# Patient Record
Sex: MALE | Race: OTHER | NOT HISPANIC OR LATINO | Employment: OTHER | URBAN - METROPOLITAN AREA
[De-identification: names, ages, dates, MRNs, and addresses within clinical notes are randomized per-mention and may not be internally consistent; named-entity substitution may affect disease eponyms.]

---

## 2018-12-31 ENCOUNTER — APPOINTMENT (EMERGENCY)
Dept: RADIOLOGY | Facility: HOSPITAL | Age: 73
DRG: 673 | End: 2018-12-31
Payer: MEDICARE

## 2018-12-31 ENCOUNTER — HOSPITAL ENCOUNTER (INPATIENT)
Facility: HOSPITAL | Age: 73
LOS: 8 days | DRG: 673 | End: 2019-01-08
Attending: EMERGENCY MEDICINE | Admitting: FAMILY MEDICINE
Payer: MEDICARE

## 2018-12-31 ENCOUNTER — APPOINTMENT (EMERGENCY)
Dept: CT IMAGING | Facility: HOSPITAL | Age: 73
DRG: 673 | End: 2018-12-31
Payer: MEDICARE

## 2018-12-31 DIAGNOSIS — R63.4 WEIGHT LOSS: ICD-10-CM

## 2018-12-31 DIAGNOSIS — R06.00 DYSPNEA: ICD-10-CM

## 2018-12-31 DIAGNOSIS — N19 KIDNEY FAILURE: ICD-10-CM

## 2018-12-31 DIAGNOSIS — R53.83 FATIGUE: ICD-10-CM

## 2018-12-31 DIAGNOSIS — I50.9 CONGESTIVE HEART FAILURE (CHF) (HCC): ICD-10-CM

## 2018-12-31 DIAGNOSIS — R07.9 CHEST PAIN: Primary | ICD-10-CM

## 2018-12-31 DIAGNOSIS — R13.10 DYSPHAGIA, UNSPECIFIED TYPE: ICD-10-CM

## 2018-12-31 PROBLEM — N17.9 AKI (ACUTE KIDNEY INJURY) (HCC): Status: ACTIVE | Noted: 2018-12-31

## 2018-12-31 PROBLEM — N18.9 CKD (CHRONIC KIDNEY DISEASE): Status: ACTIVE | Noted: 2018-12-31

## 2018-12-31 PROBLEM — R06.02 SHORTNESS OF BREATH: Status: ACTIVE | Noted: 2018-12-31

## 2018-12-31 PROBLEM — I42.9 CARDIOMYOPATHY (HCC): Status: ACTIVE | Noted: 2018-12-31

## 2018-12-31 LAB
ALBUMIN SERPL BCP-MCNC: 3.4 G/DL (ref 3.5–5)
ALP SERPL-CCNC: 90 U/L (ref 46–116)
ALT SERPL W P-5'-P-CCNC: 23 U/L (ref 12–78)
ANION GAP SERPL CALCULATED.3IONS-SCNC: 9 MMOL/L (ref 4–13)
APTT PPP: 34 SECONDS (ref 26–38)
AST SERPL W P-5'-P-CCNC: 14 U/L (ref 5–45)
ATRIAL RATE: 86 BPM
ATRIAL RATE: 95 BPM
BASOPHILS # BLD AUTO: 0.03 THOUSANDS/ΜL (ref 0–0.1)
BASOPHILS NFR BLD AUTO: 0 % (ref 0–1)
BILIRUB SERPL-MCNC: 0.6 MG/DL (ref 0.2–1)
BUN SERPL-MCNC: 53 MG/DL (ref 5–25)
CALCIUM SERPL-MCNC: 9.8 MG/DL (ref 8.3–10.1)
CHLORIDE SERPL-SCNC: 93 MMOL/L (ref 100–108)
CO2 SERPL-SCNC: 27 MMOL/L (ref 21–32)
CREAT SERPL-MCNC: 4.73 MG/DL (ref 0.6–1.3)
EOSINOPHIL # BLD AUTO: 0.04 THOUSAND/ΜL (ref 0–0.61)
EOSINOPHIL NFR BLD AUTO: 0 % (ref 0–6)
ERYTHROCYTE [DISTWIDTH] IN BLOOD BY AUTOMATED COUNT: 16.3 % (ref 11.6–15.1)
GFR SERPL CREATININE-BSD FRML MDRD: 12 ML/MIN/1.73SQ M
GLUCOSE SERPL-MCNC: 174 MG/DL (ref 65–140)
GLUCOSE SERPL-MCNC: 181 MG/DL (ref 65–140)
GLUCOSE SERPL-MCNC: 231 MG/DL (ref 65–140)
HCT VFR BLD AUTO: 34.5 % (ref 36.5–49.3)
HGB BLD-MCNC: 11.2 G/DL (ref 12–17)
IMM GRANULOCYTES # BLD AUTO: 0.03 THOUSAND/UL (ref 0–0.2)
IMM GRANULOCYTES NFR BLD AUTO: 0 % (ref 0–2)
INR PPP: 1.22 (ref 0.86–1.17)
LYMPHOCYTES # BLD AUTO: 1.67 THOUSANDS/ΜL (ref 0.6–4.47)
LYMPHOCYTES NFR BLD AUTO: 18 % (ref 14–44)
MCH RBC QN AUTO: 26 PG (ref 26.8–34.3)
MCHC RBC AUTO-ENTMCNC: 32.5 G/DL (ref 31.4–37.4)
MCV RBC AUTO: 80 FL (ref 82–98)
MONOCYTES # BLD AUTO: 0.62 THOUSAND/ΜL (ref 0.17–1.22)
MONOCYTES NFR BLD AUTO: 7 % (ref 4–12)
NEUTROPHILS # BLD AUTO: 7.07 THOUSANDS/ΜL (ref 1.85–7.62)
NEUTS SEG NFR BLD AUTO: 75 % (ref 43–75)
NRBC BLD AUTO-RTO: 0 /100 WBCS
NT-PROBNP SERPL-MCNC: ABNORMAL PG/ML
P AXIS: 64 DEGREES
P AXIS: 75 DEGREES
PLATELET # BLD AUTO: 236 THOUSANDS/UL (ref 149–390)
PLATELET # BLD AUTO: 257 THOUSANDS/UL (ref 149–390)
PMV BLD AUTO: 10.7 FL (ref 8.9–12.7)
PMV BLD AUTO: 10.8 FL (ref 8.9–12.7)
POTASSIUM SERPL-SCNC: 5.1 MMOL/L (ref 3.5–5.3)
PR INTERVAL: 274 MS
PR INTERVAL: 276 MS
PROT SERPL-MCNC: 8.7 G/DL (ref 6.4–8.2)
PROTHROMBIN TIME: 15.3 SECONDS (ref 11.8–14.2)
QRS AXIS: -11 DEGREES
QRS AXIS: -17 DEGREES
QRSD INTERVAL: 86 MS
QRSD INTERVAL: 92 MS
QT INTERVAL: 350 MS
QT INTERVAL: 378 MS
QTC INTERVAL: 439 MS
QTC INTERVAL: 452 MS
RBC # BLD AUTO: 4.3 MILLION/UL (ref 3.88–5.62)
SODIUM SERPL-SCNC: 129 MMOL/L (ref 136–145)
T WAVE AXIS: 105 DEGREES
T WAVE AXIS: 119 DEGREES
TROPONIN I SERPL-MCNC: 0.04 NG/ML
TROPONIN I SERPL-MCNC: 0.06 NG/ML
TROPONIN I SERPL-MCNC: 0.06 NG/ML
VENTRICULAR RATE: 86 BPM
VENTRICULAR RATE: 95 BPM
WBC # BLD AUTO: 9.46 THOUSAND/UL (ref 4.31–10.16)

## 2018-12-31 PROCEDURE — 36415 COLL VENOUS BLD VENIPUNCTURE: CPT

## 2018-12-31 PROCEDURE — G0008 ADMIN INFLUENZA VIRUS VAC: HCPCS | Performed by: FAMILY MEDICINE

## 2018-12-31 PROCEDURE — 71250 CT THORAX DX C-: CPT

## 2018-12-31 PROCEDURE — 90662 IIV NO PRSV INCREASED AG IM: CPT | Performed by: FAMILY MEDICINE

## 2018-12-31 PROCEDURE — 85730 THROMBOPLASTIN TIME PARTIAL: CPT | Performed by: EMERGENCY MEDICINE

## 2018-12-31 PROCEDURE — 36415 COLL VENOUS BLD VENIPUNCTURE: CPT | Performed by: EMERGENCY MEDICINE

## 2018-12-31 PROCEDURE — 99223 1ST HOSP IP/OBS HIGH 75: CPT | Performed by: FAMILY MEDICINE

## 2018-12-31 PROCEDURE — 84484 ASSAY OF TROPONIN QUANT: CPT | Performed by: FAMILY MEDICINE

## 2018-12-31 PROCEDURE — 84484 ASSAY OF TROPONIN QUANT: CPT | Performed by: EMERGENCY MEDICINE

## 2018-12-31 PROCEDURE — 83880 ASSAY OF NATRIURETIC PEPTIDE: CPT | Performed by: EMERGENCY MEDICINE

## 2018-12-31 PROCEDURE — 99285 EMERGENCY DEPT VISIT HI MDM: CPT

## 2018-12-31 PROCEDURE — 93005 ELECTROCARDIOGRAM TRACING: CPT

## 2018-12-31 PROCEDURE — 85049 AUTOMATED PLATELET COUNT: CPT | Performed by: FAMILY MEDICINE

## 2018-12-31 PROCEDURE — 93010 ELECTROCARDIOGRAM REPORT: CPT | Performed by: INTERNAL MEDICINE

## 2018-12-31 PROCEDURE — 80053 COMPREHEN METABOLIC PANEL: CPT | Performed by: EMERGENCY MEDICINE

## 2018-12-31 PROCEDURE — 71045 X-RAY EXAM CHEST 1 VIEW: CPT

## 2018-12-31 PROCEDURE — 85025 COMPLETE CBC W/AUTO DIFF WBC: CPT | Performed by: EMERGENCY MEDICINE

## 2018-12-31 PROCEDURE — 85610 PROTHROMBIN TIME: CPT | Performed by: EMERGENCY MEDICINE

## 2018-12-31 PROCEDURE — 74176 CT ABD & PELVIS W/O CONTRAST: CPT

## 2018-12-31 PROCEDURE — 82948 REAGENT STRIP/BLOOD GLUCOSE: CPT

## 2018-12-31 RX ORDER — BENZONATATE 100 MG/1
100 CAPSULE ORAL
COMMUNITY

## 2018-12-31 RX ORDER — FUROSEMIDE 10 MG/ML
40 INJECTION INTRAMUSCULAR; INTRAVENOUS
Status: DISCONTINUED | OUTPATIENT
Start: 2018-12-31 | End: 2019-01-08

## 2018-12-31 RX ORDER — PANTOPRAZOLE SODIUM 40 MG/1
40 TABLET, DELAYED RELEASE ORAL
Status: ON HOLD | COMMUNITY
End: 2019-01-15

## 2018-12-31 RX ORDER — TORSEMIDE 20 MG/1
20 TABLET ORAL
COMMUNITY
End: 2019-01-16 | Stop reason: HOSPADM

## 2018-12-31 RX ORDER — SODIUM BICARBONATE 325 MG/1
325 TABLET ORAL
COMMUNITY
End: 2019-01-16 | Stop reason: HOSPADM

## 2018-12-31 RX ORDER — ATORVASTATIN CALCIUM 10 MG/1
10 TABLET, FILM COATED ORAL
Status: ON HOLD | COMMUNITY
End: 2019-01-15

## 2018-12-31 RX ORDER — ASPIRIN 81 MG/1
81 TABLET, CHEWABLE ORAL DAILY
COMMUNITY

## 2018-12-31 RX ORDER — BENZONATATE 100 MG/1
100 CAPSULE ORAL 3 TIMES DAILY
Status: DISCONTINUED | OUTPATIENT
Start: 2018-12-31 | End: 2019-01-08 | Stop reason: HOSPADM

## 2018-12-31 RX ORDER — PANTOPRAZOLE SODIUM 40 MG/1
40 TABLET, DELAYED RELEASE ORAL
Status: DISCONTINUED | OUTPATIENT
Start: 2019-01-01 | End: 2019-01-08 | Stop reason: HOSPADM

## 2018-12-31 RX ORDER — ATORVASTATIN CALCIUM 10 MG/1
10 TABLET, FILM COATED ORAL
Status: DISCONTINUED | OUTPATIENT
Start: 2019-01-01 | End: 2019-01-08 | Stop reason: HOSPADM

## 2018-12-31 RX ORDER — ASPIRIN 81 MG/1
81 TABLET, CHEWABLE ORAL DAILY
Status: DISCONTINUED | OUTPATIENT
Start: 2019-01-01 | End: 2019-01-08 | Stop reason: HOSPADM

## 2018-12-31 RX ORDER — HEPARIN SODIUM 5000 [USP'U]/ML
5000 INJECTION, SOLUTION INTRAVENOUS; SUBCUTANEOUS EVERY 8 HOURS SCHEDULED
Status: DISCONTINUED | OUTPATIENT
Start: 2018-12-31 | End: 2019-01-06

## 2018-12-31 RX ADMIN — INFLUENZA A VIRUS A/MICHIGAN/45/2015 X-275 (H1N1) ANTIGEN (FORMALDEHYDE INACTIVATED), INFLUENZA A VIRUS A/SINGAPORE/INFIMH-16-0019/2016 IVR-186 (H3N2) ANTIGEN (FORMALDEHYDE INACTIVATED), AND INFLUENZA B VIRUS B/MARYLAND/15/2016 BX-69A (A B/COLORADO/6/2017-LIKE VIRUS) ANTIGEN (FORMALDEHYDE INACTIVATED) 0.5 ML: 60; 60; 60 INJECTION, SUSPENSION INTRAMUSCULAR at 20:21

## 2018-12-31 RX ADMIN — FUROSEMIDE 40 MG: 10 INJECTION, SOLUTION INTRAMUSCULAR; INTRAVENOUS at 20:21

## 2018-12-31 RX ADMIN — BENZONATATE 100 MG: 100 CAPSULE ORAL at 20:21

## 2018-12-31 RX ADMIN — HEPARIN SODIUM 5000 UNITS: 5000 INJECTION, SOLUTION INTRAVENOUS; SUBCUTANEOUS at 21:40

## 2018-12-31 NOTE — ED PROVIDER NOTES
History  Chief Complaint   Patient presents with    Chest Pain     pt c/o mid-chest pain since last night  cough  EMS administered 324 ASA  and SL nitrox3     81yo male is coming with CP and SOB and fatigue and decreased exercise tolerance for the past few days  Pt was [place] and while there got sick, and was told he "had a small heart attack" when he was in the hospital  He returned to [facility] and went to [facility] and was admitted and was told he "had massive heart attack" and was in kidney and heart failure  Pt has had difficulty with eating as well  He went home after a week at [facility], he was recommended rehab facility but he refused but he has gone home and not done well  He again last night had intermittent CP, pressure and felt SOB, it relieved some with NTG but called ambulance  History provided by:  Patient  Chest Pain   Pain location:  Substernal area  Pain quality: pressure    Pain radiates to:  Does not radiate  Pain radiates to the back: no    Pain severity:  Moderate  Onset quality:  Gradual  Duration:  1 day  Timing:  Intermittent  Progression:  Waxing and waning  Chronicity:  New  Context: at rest    Relieved by:  Nitroglycerin  Worsened by:  Nothing tried  Ineffective treatments:  Rest  Associated symptoms: cough, fatigue, nausea and shortness of breath    Associated symptoms: no abdominal pain, no anxiety, no back pain, no diaphoresis, no fever, no lower extremity edema, no numbness, no orthopnea, no syncope, not vomiting and no weakness    Risk factors: coronary artery disease and diabetes mellitus        Prior to Admission Medications   Prescriptions Last Dose Informant Patient Reported? Taking?    METOPROLOL SUCCINATE PO   Yes Yes   Sig: Take 500 mg by mouth   aspirin 81 mg chewable tablet   Yes Yes   Sig: Chew 81 mg daily   atorvastatin (LIPITOR) 10 mg tablet   Yes Yes   Sig: Take 10 mg by mouth   benzonatate (TESSALON PERLES) 100 mg capsule   Yes Yes   Sig: Take 100 mg by mouth   pantoprazole (PROTONIX) 40 mg tablet   Yes Yes   Sig: Take 40 mg by mouth   sodium bicarbonate 325 MG tablet   Yes Yes   Sig: Take 325 mg by mouth   torsemide (DEMADEX) 20 mg tablet   Yes Yes   Sig: Take 20 mg by mouth      Facility-Administered Medications: None       Past Medical History:   Diagnosis Date    Hyperlipidemia     Hypertension     Myocardial infarction [facility])        Past Surgical History:   Procedure Laterality Date    CATARACT EXTRACTION         History reviewed  No pertinent family history  I have reviewed and agree with the history as documented  Social History   Substance Use Topics    Smoking status: Former Smoker    Smokeless tobacco: Never Used    Alcohol use No        Review of Systems   Constitutional: Positive for fatigue  Negative for diaphoresis and fever  Respiratory: Positive for cough and shortness of breath  Cardiovascular: Positive for chest pain  Negative for orthopnea and syncope  Gastrointestinal: Positive for nausea  Negative for abdominal pain and vomiting  Musculoskeletal: Negative for back pain  Neurological: Negative for weakness and numbness  All other systems reviewed and are negative  Physical Exam  Physical Exam   Constitutional: He is oriented to person, place, and time  He appears well-developed and well-nourished  No distress  HENT:   Head: Normocephalic and atraumatic  Mouth/Throat: Mucous membranes are dry  Eyes: Pupils are equal, round, and reactive to light  Conjunctivae and EOM are normal    Neck: Normal range of motion  Neck supple  Cardiovascular: Normal rate and regular rhythm  Pulmonary/Chest: Effort normal  No respiratory distress  He has no wheezes  He has rales  Abdominal: Soft  Bowel sounds are normal  He exhibits no distension  There is no tenderness  Musculoskeletal: Normal range of motion  He exhibits edema  Neurological: He is alert and oriented to person, place, and time  No cranial nerve deficit   He exhibits normal muscle tone  Skin: Skin is warm and dry  He is not diaphoretic  Psychiatric: He has a normal mood and affect  Nursing note and vitals reviewed  Vital Signs  ED Triage Vitals [12/31/18 1252]   Temperature Pulse Respirations Blood Pressure SpO2   97 9 °F (36 6 °C) 95 19 148/83 98 %      Temp Source Heart Rate Source Patient Position - Orthostatic VS BP Location FiO2 (%)   Oral Monitor Lying Right arm --      Pain Score       Worst Possible Pain           Vitals:    12/31/18 1252   BP: 148/83   Pulse: 95   Patient Position - Orthostatic VS: Lying       Visual Acuity      ED Medications  Medications - No data to display    Diagnostic Studies  Results Reviewed     Procedure Component Value Units Date/Time    Troponin I [[id number]]  (Normal) Collected:  12/31/18 1259    Lab Status:  Final result Specimen:  Blood from Arm, Left Updated:  12/31/18 1330     Troponin I 0 04 ng/mL     CBC and differential [[id number]]  (Abnormal) Collected:  12/31/18 1259    Lab Status:  Final result Specimen:  Blood from Arm, Left Updated:  12/31/18 1307     WBC 9 46 Thousand/uL      RBC 4 30 Million/uL      Hemoglobin 11 2 (L) g/dL      Hematocrit 34 5 (L) %      MCV 80 (L) fL      MCH 26 0 (L) pg      MCHC 32 5 g/dL      RDW 16 3 (H) %      MPV 10 7 fL      Platelets 280 Thousands/uL      nRBC 0 /100 WBCs      Neutrophils Relative 75 %      Immat GRANS % 0 %      Lymphocytes Relative 18 %      Monocytes Relative 7 %      Eosinophils Relative 0 %      Basophils Relative 0 %      Neutrophils Absolute 7 07 Thousands/µL      Immature Grans Absolute 0 03 Thousand/uL      Lymphocytes Absolute 1 67 Thousands/µL      Monocytes Absolute 0 62 Thousand/µL      Eosinophils Absolute 0 04 Thousand/µL      Basophils Absolute 0 03 Thousands/µL     Comprehensive metabolic panel [[id number]] Collected:  12/31/18 1259    Lab Status:   In process Specimen:  Blood from Arm, Left Updated:  12/31/18 1303                 X-ray chest 1 view portable    (Results Pending)              Procedures  Procedures       Phone Contacts  ED Phone Contact    ED Course  ED Course as of Gian 10 1515   Mon Dec 31, 2018   1629 D/w pt labs and heart and renal failure  Pt was offered rehab after [place] but didn't want to go but d/w him if he refused to stay would try and talk with case management about getting him home services but that would take days  26 Pt                                MDM  Number of Diagnoses or Management Options  Chest pain: new and requires workup  Congestive heart failure (CHF) ([facility] ): new and requires workup  Dyspnea: new and requires workup  Fatigue: new and requires workup  Kidney failure: new and requires workup     Amount and/or Complexity of Data Reviewed  Clinical lab tests: ordered and reviewed  Tests in the radiology section of CPT®: ordered and reviewed  Review and summarize past medical records: yes (Received records from [facility] from last admission  Had echo and labs )    Risk of Complications, Morbidity, and/or Mortality  Presenting problems: high      CritCare Time    Disposition  Final diagnoses:   None     ED Disposition     None      Follow-up Information    None         Patient's Medications   Discharge Prescriptions    No medications on file     No discharge procedures on file      ED Provider  Electronically Signed by           Amanda Greco MD  01/10/19 0269

## 2018-12-31 NOTE — H&P
History and Physical - [facility]    Patient Information: Cali Turner 72 y o  male MRN: [id number]  Unit/Bed#: ED 23 Encounter: [id number]  Admitting Physician: Nena Crowe MD  PCP: No primary care provider on file  Date of Admission:  12/31/18    Assessment/Plan:    Hospital Problem List:     Active Problems:    Shortness of breath    Chest pain    NATALIIA (acute kidney injury) ([facility] )    CKD (chronic kidney disease)    Cardiomyopathy ([facility] )      Plan for the Primary Problem(s):  Short of breath:likely secondary to acute decompensation of cardiomyopathy with low ejection fraction vs bronchopneumonia process, chest x-ray with  Right-sided infiltrates suspicious for developing pneumonia as well as trace pleural effusions    -Observe off antibiotic no fever, No elevation of WBC, patient looks acutely ill consider to start antibiotic after procalcitonin  -Procalcitonin  -input and output  -IV Lasix  -continue telemetric  -cardiology Nephrology recommendation  -Please see chart from another Hospital for echo report and endoscopy, as well as previous discharge summary     · NATALIIA/CKDIV:  Patient was discharged on torsemide  Appears to be overload  Per record patient refused to be on hemodialysis which at this point he could be conceded  Nephrology recommendation  · Chest pain:  most likely secondary to acute decompensation of cardiomyopathy  Follow further Cardiology recommendation  · CAD:  Status post nonSTMI per clinical our record in the chart  Patient was no candidate for cardiac catheterization as per record    Continue secondary prevention and follow-up cardiology recommendation  · GERD with spha: continue PPI    VTE Prophylaxis: Heparin  / sequential compression device   Code Status: full code  POLST: There is no POLST form on file for this patient (pre-hospital)    Anticipated Length of Stay:  Patient will be admitted on an Inpatient basis with an anticipated length of stay of  > 2 midnights  Justification for Hospital Stay:  Short of breath secondary to acute decompensation of cardiomyopathy    Total Time for Visit, including Counseling / Coordination of Care: 1 hour  Greater than 50% of this total time spent on direct patient counseling and coordination of care  Chief Complaint:  Short of breath    History of Present Illness:    Evelyn Camejo is a 72 y o  male significant past medical history of hyperlipidemia, HTN, CAD, cardiomyopathy, who was recently discharged from [facility] after patient was admitted for non ST MI and acute on chronic kidney disease  Patient cancer emergency department complaining of increasing short of breath for the last 24 hour, worse on extension, requiring oxygen supplementation, patient also states he has been having some chest pain, comes and goes  Patient states he has been having  generalized weakness  decrease of appetite  Review of Systems:    Review of Systems   Constitutional: Negative for activity change, appetite change, chills and diaphoresis  Respiratory: Positive for cough, chest tightness and shortness of breath  Cardiovascular: Positive for chest pain  Negative for palpitations and leg swelling  Genitourinary: Negative for difficulty urinating, enuresis and flank pain  Past Medical and Surgical History:     Past Medical History:   Diagnosis Date    CHF [facility]congestive heart failure) ([facility] )     [facility]     Dysphagia     Hyperlipidemia     Hypertension     Myocardial infarction [facility])        Past Surgical History:   Procedure Laterality Date    CATARACT EXTRACTION         Meds/Allergies:    Prior to Admission medications    Medication Sig Start Date End Date Taking?  Authorizing Provider   aspirin 81 mg chewable tablet Chew 81 mg daily   Yes Historical Provider, MD   atorvastatin (LIPITOR) 10 mg tablet Take 10 mg by mouth   Yes Historical Provider, MD   benzonatate (TESSALON PERLES) 100 mg capsule Take 100 mg by mouth   Yes Historical Provider, MD   METOPROLOL SUCCINATE PO Take 500 mg by mouth   Yes Historical Provider, MD   pantoprazole (PROTONIX) 40 mg tablet Take 40 mg by mouth   Yes Historical Provider, MD   sodium bicarbonate 325 MG tablet Take 325 mg by mouth   Yes Historical Provider, MD   torsemide (DEMADEX) 20 mg tablet Take 20 mg by mouth   Yes Historical Provider, MD     I have reviewed home medications with patient personally  Allergies: No Known Allergies    Social History:     Marital Status: /Civil Union   Occupation:   Patient Pre-hospital Living Situation:   Patient Pre-hospital Level of Mobility:   Patient Pre-hospital Diet Restrictions:   Substance Use History:   History   Alcohol Use No     History   Smoking Status    Former Smoker   Smokeless Tobacco    Never Used     History   Drug Use No       Family History:    non-contributory    Physical Exam:     Vitals:   Blood Pressure: 131/77 (12/31/18 1730)  Pulse: 80 (12/31/18 1730)  Temperature: 97 9 °F (36 6 °C) (12/31/18 1252)  Temp Source: Oral (12/31/18 1252)  Respirations: 18 (12/31/18 1730)  Weight - Scale: 70 6 kg (155 lb 10 3 oz) (12/31/18 1252)  SpO2: 100 % (12/31/18 1730)    Physical Exam   Constitutional: He is oriented to person, place, and time  He appears ill  No distress  On NC   Neck: Normal range of motion  Neck supple  JVD present  No tracheal deviation present  No thyromegaly present  Cardiovascular: Normal rate, regular rhythm, normal heart sounds and intact distal pulses  Exam reveals no gallop and no friction rub  No murmur heard  Pulmonary/Chest: No respiratory distress  He has no wheezes  He has rales  He exhibits no tenderness  All over   Abdominal: He exhibits no distension and no mass  There is no tenderness  There is no rebound and no guarding  Musculoskeletal: He exhibits no edema or tenderness  Lymphadenopathy:     He has no cervical adenopathy     Neurological: He is alert and oriented to person, place, and time  He has normal reflexes  No cranial nerve deficit  Coordination normal    Skin: Skin is warm  He is not diaphoretic  Psychiatric: He has a normal mood and affect  Additional Data:     Lab Results: I have personally reviewed pertinent reports  Results from last 7 days  Lab Units 12/31/18  1259   WBC Thousand/uL 9 46   HEMOGLOBIN g/dL 11 2*   HEMATOCRIT % 34 5*   PLATELETS Thousands/uL 257   NEUTROS PCT % 75   LYMPHS PCT % 18   MONOS PCT % 7   EOS PCT % 0       Results from last 7 days  Lab Units 12/31/18  1259   POTASSIUM mmol/L 5 1   CHLORIDE mmol/L 93*   CO2 mmol/L 27   BUN mg/dL 53*   CREATININE mg/dL 4 73*   CALCIUM mg/dL 9 8   ALK PHOS U/L 90   ALT U/L 23   AST U/L 14       Results from last 7 days  Lab Units 12/31/18  1506   INR  1 22*       Imaging: I have personally reviewed pertinent reports  Ct Chest Abdomen Pelvis Wo Contrast    Result Date: 12/31/2018  Narrative: CT CHEST, ABDOMEN AND PELVIS WITHOUT IV CONTRAST INDICATION:   Chest pain and renal failure  COMPARISON:  None  TECHNIQUE: CT examination of the chest, abdomen and pelvis was performed without intravenous contrast   Axial, sagittal, and coronal 2D reformatted images were created from the source data and submitted for interpretation  Radiation dose length product (DLP) for this visit:  615 mGy-cm   This examination, like all CT scans performed in the [facility], was performed utilizing techniques to minimize radiation dose exposure, including the use of iterative reconstruction and automated exposure control  Enteric contrast was not administered in accordance with physician request  FINDINGS: CHEST LUNGS:  There is bibasilar consolidation, likely compressive atelectasis  There is a 2 mm right upper lobe pulmonary nodule on series 3, image 52  PLEURA:  There are moderate bilateral pleural effusions  HEART/GREAT VESSELS:  There is mild cardiomegaly   MEDIASTINUM AND VIKKI: Unremarkable  CHEST WALL AND LOWER NECK:   Unremarkable  ABDOMEN LIVER/BILIARY TREE:  Unremarkable  GALLBLADDER:  There are gallstone(s) within the gallbladder, without pericholecystic inflammatory changes  SPLEEN:  Unremarkable  PANCREAS:  Unremarkable  ADRENAL GLANDS:  Unremarkable  KIDNEYS/URETERS:  Unremarkable  No hydronephrosis  STOMACH AND BOWEL:  Unremarkable  APPENDIX:  No findings to suggest appendicitis  ABDOMINOPELVIC CAVITY:  No ascites or free intraperitoneal air  No lymphadenopathy  VESSELS:  Unremarkable for patient's age  PELVIS REPRODUCTIVE ORGANS:  Unremarkable for patient's age  URINARY BLADDER:  Unremarkable  ABDOMINAL WALL/INGUINAL REGIONS:  Unremarkable  OSSEOUS STRUCTURES:  No acute fracture or destructive osseous lesion  Impression: 1  Cardiomegaly with moderate bilateral pleural effusions and overlying compressive atelectasis  Clinical correlation for congestive heart failure recommended  2   2 mm right upper lobe nodule  Based on current Fleischner Society 2017 Guidelines on incidental pulmonary nodule, no routine follow-up is needed if the patient is considered low risk for lung cancer  If the patient is considered high risk for lung cancer, 12 month follow-up non-contrast chest CT is recommended  3   No acute abnormality in the abdomen or pelvis  4   Cholelithiasis  Workstation performed: [id number]     X-ray Chest 1 View Portable    Result Date: 12/31/2018  Narrative: CHEST INDICATION:   chest pain  Cough COMPARISON:  None EXAM PERFORMED/VIEWS:  XR CHEST PORTABLE FINDINGS: There is a suboptimal inspiration  This may partly explain the prominent appearance of the heart  Cardiomegaly not excluded  There is an ill-defined right lung infiltrate in the infrahilar region and there appear to be trace pleural effusions  Left lung clear  No pneumothorax identified  Osseous structures appear within normal limits for patient age       Impression: Right-sided infiltrates suspicious for developing pneumonia as well as trace pleural effusions  Questionable cardiomegaly    Limited inspiration may exaggerate some of the findings  Suggest continued follow-up preferably with deeper inspiratory effort with PA and lateral views  Workstation performed: [id number]       EKG, Pathology, and Other Studies Reviewed on Admission:   · EKG:     Allscripts / Epic Records Reviewed: Yes     ** Please Note: This note has been constructed using a voice recognition system   **

## 2018-12-31 NOTE — ED NOTES
Request Faxed to [facility] for medical records from pts recent admission       Sonja Gaitan RN  12/31/18 8238

## 2019-01-01 PROBLEM — E87.1 HYPONATREMIA: Status: ACTIVE | Noted: 2019-01-01

## 2019-01-01 LAB
ALBUMIN SERPL BCP-MCNC: 2.8 G/DL (ref 3.5–5)
ALP SERPL-CCNC: 73 U/L (ref 46–116)
ALT SERPL W P-5'-P-CCNC: 17 U/L (ref 12–78)
ANION GAP SERPL CALCULATED.3IONS-SCNC: 10 MMOL/L (ref 4–13)
AST SERPL W P-5'-P-CCNC: 16 U/L (ref 5–45)
ATRIAL RATE: 88 BPM
BACTERIA UR QL AUTO: ABNORMAL /HPF
BASOPHILS # BLD AUTO: 0.04 THOUSANDS/ΜL (ref 0–0.1)
BASOPHILS NFR BLD AUTO: 1 % (ref 0–1)
BILIRUB SERPL-MCNC: 0.5 MG/DL (ref 0.2–1)
BILIRUB UR QL STRIP: NEGATIVE
BUN SERPL-MCNC: 52 MG/DL (ref 5–25)
CALCIUM SERPL-MCNC: 9.2 MG/DL (ref 8.3–10.1)
CHLORIDE SERPL-SCNC: 96 MMOL/L (ref 100–108)
CLARITY UR: CLEAR
CO2 SERPL-SCNC: 27 MMOL/L (ref 21–32)
COLOR UR: YELLOW
CREAT SERPL-MCNC: 4.69 MG/DL (ref 0.6–1.3)
CREAT UR-MCNC: 23.9 MG/DL
EOSINOPHIL # BLD AUTO: 0.09 THOUSAND/ΜL (ref 0–0.61)
EOSINOPHIL NFR BLD AUTO: 1 % (ref 0–6)
ERYTHROCYTE [DISTWIDTH] IN BLOOD BY AUTOMATED COUNT: 16.2 % (ref 11.6–15.1)
GFR SERPL CREATININE-BSD FRML MDRD: 12 ML/MIN/1.73SQ M
GLUCOSE SERPL-MCNC: 104 MG/DL (ref 65–140)
GLUCOSE UR STRIP-MCNC: NEGATIVE MG/DL
HCT VFR BLD AUTO: 29.5 % (ref 36.5–49.3)
HGB BLD-MCNC: 9.3 G/DL (ref 12–17)
HGB UR QL STRIP.AUTO: ABNORMAL
IMM GRANULOCYTES # BLD AUTO: 0.02 THOUSAND/UL (ref 0–0.2)
IMM GRANULOCYTES NFR BLD AUTO: 0 % (ref 0–2)
KETONES UR STRIP-MCNC: NEGATIVE MG/DL
LEUKOCYTE ESTERASE UR QL STRIP: NEGATIVE
LYMPHOCYTES # BLD AUTO: 1.82 THOUSANDS/ΜL (ref 0.6–4.47)
LYMPHOCYTES NFR BLD AUTO: 25 % (ref 14–44)
MCH RBC QN AUTO: 26 PG (ref 26.8–34.3)
MCHC RBC AUTO-ENTMCNC: 31.5 G/DL (ref 31.4–37.4)
MCV RBC AUTO: 82 FL (ref 82–98)
MICROALBUMIN UR-MCNC: 1490 MG/L (ref 0–20)
MICROALBUMIN/CREAT 24H UR: 6234 MG/G CREATININE (ref 0–30)
MONOCYTES # BLD AUTO: 0.57 THOUSAND/ΜL (ref 0.17–1.22)
MONOCYTES NFR BLD AUTO: 8 % (ref 4–12)
NEUTROPHILS # BLD AUTO: 4.82 THOUSANDS/ΜL (ref 1.85–7.62)
NEUTS SEG NFR BLD AUTO: 65 % (ref 43–75)
NITRITE UR QL STRIP: NEGATIVE
NON-SQ EPI CELLS URNS QL MICRO: ABNORMAL /HPF
NRBC BLD AUTO-RTO: 0 /100 WBCS
P AXIS: 74 DEGREES
PH UR STRIP.AUTO: 6 [PH] (ref 4.5–8)
PLATELET # BLD AUTO: 205 THOUSANDS/UL (ref 149–390)
PMV BLD AUTO: 10.9 FL (ref 8.9–12.7)
POTASSIUM SERPL-SCNC: 4.3 MMOL/L (ref 3.5–5.3)
PR INTERVAL: 268 MS
PROCALCITONIN SERPL-MCNC: 0.07 NG/ML
PROT SERPL-MCNC: 7.3 G/DL (ref 6.4–8.2)
PROT UR STRIP-MCNC: ABNORMAL MG/DL
QRS AXIS: -21 DEGREES
QRSD INTERVAL: 92 MS
QT INTERVAL: 366 MS
QTC INTERVAL: 442 MS
RBC # BLD AUTO: 3.58 MILLION/UL (ref 3.88–5.62)
RBC #/AREA URNS AUTO: ABNORMAL /HPF
SODIUM 24H UR-SCNC: 96 MOL/L
SODIUM SERPL-SCNC: 133 MMOL/L (ref 136–145)
SP GR UR STRIP.AUTO: 1.02 (ref 1–1.03)
T WAVE AXIS: 121 DEGREES
TROPONIN I SERPL-MCNC: 0.05 NG/ML
TROPONIN I SERPL-MCNC: 0.06 NG/ML
UROBILINOGEN UR QL STRIP.AUTO: 0.2 E.U./DL
UUN 24H UR-MCNC: 191 MG/DL
VENTRICULAR RATE: 88 BPM
WBC # BLD AUTO: 7.36 THOUSAND/UL (ref 4.31–10.16)
WBC #/AREA URNS AUTO: ABNORMAL /HPF

## 2019-01-01 PROCEDURE — 82043 UR ALBUMIN QUANTITATIVE: CPT | Performed by: INTERNAL MEDICINE

## 2019-01-01 PROCEDURE — 84540 ASSAY OF URINE/UREA-N: CPT | Performed by: INTERNAL MEDICINE

## 2019-01-01 PROCEDURE — 93005 ELECTROCARDIOGRAM TRACING: CPT

## 2019-01-01 PROCEDURE — 80053 COMPREHEN METABOLIC PANEL: CPT | Performed by: FAMILY MEDICINE

## 2019-01-01 PROCEDURE — 99222 1ST HOSP IP/OBS MODERATE 55: CPT | Performed by: INTERNAL MEDICINE

## 2019-01-01 PROCEDURE — 99232 SBSQ HOSP IP/OBS MODERATE 35: CPT | Performed by: FAMILY MEDICINE

## 2019-01-01 PROCEDURE — 99223 1ST HOSP IP/OBS HIGH 75: CPT | Performed by: INTERNAL MEDICINE

## 2019-01-01 PROCEDURE — 85025 COMPLETE CBC W/AUTO DIFF WBC: CPT | Performed by: FAMILY MEDICINE

## 2019-01-01 PROCEDURE — 82570 ASSAY OF URINE CREATININE: CPT | Performed by: INTERNAL MEDICINE

## 2019-01-01 PROCEDURE — 81001 URINALYSIS AUTO W/SCOPE: CPT | Performed by: INTERNAL MEDICINE

## 2019-01-01 PROCEDURE — 84484 ASSAY OF TROPONIN QUANT: CPT | Performed by: FAMILY MEDICINE

## 2019-01-01 PROCEDURE — 90732 PPSV23 VACC 2 YRS+ SUBQ/IM: CPT | Performed by: FAMILY MEDICINE

## 2019-01-01 PROCEDURE — 84145 PROCALCITONIN (PCT): CPT | Performed by: FAMILY MEDICINE

## 2019-01-01 PROCEDURE — G0009 ADMIN PNEUMOCOCCAL VACCINE: HCPCS | Performed by: FAMILY MEDICINE

## 2019-01-01 PROCEDURE — 84300 ASSAY OF URINE SODIUM: CPT | Performed by: INTERNAL MEDICINE

## 2019-01-01 PROCEDURE — 93010 ELECTROCARDIOGRAM REPORT: CPT | Performed by: INTERNAL MEDICINE

## 2019-01-01 RX ORDER — HYDROMORPHONE HCL/PF 1 MG/ML
0.5 SYRINGE (ML) INJECTION ONCE
Status: COMPLETED | OUTPATIENT
Start: 2019-01-01 | End: 2019-01-01

## 2019-01-01 RX ADMIN — ASPIRIN 81 MG 81 MG: 81 TABLET ORAL at 09:23

## 2019-01-01 RX ADMIN — HEPARIN SODIUM 5000 UNITS: 5000 INJECTION, SOLUTION INTRAVENOUS; SUBCUTANEOUS at 23:49

## 2019-01-01 RX ADMIN — ATORVASTATIN CALCIUM 10 MG: 10 TABLET, FILM COATED ORAL at 17:36

## 2019-01-01 RX ADMIN — HEPARIN SODIUM 5000 UNITS: 5000 INJECTION, SOLUTION INTRAVENOUS; SUBCUTANEOUS at 14:12

## 2019-01-01 RX ADMIN — FUROSEMIDE 40 MG: 10 INJECTION, SOLUTION INTRAMUSCULAR; INTRAVENOUS at 11:10

## 2019-01-01 RX ADMIN — HEPARIN SODIUM 5000 UNITS: 5000 INJECTION, SOLUTION INTRAVENOUS; SUBCUTANEOUS at 05:16

## 2019-01-01 RX ADMIN — BENZONATATE 100 MG: 100 CAPSULE ORAL at 17:36

## 2019-01-01 RX ADMIN — PNEUMOCOCCAL VACCINE POLYVALENT 0.5 ML
25; 25; 25; 25; 25; 25; 25; 25; 25; 25; 25; 25; 25; 25; 25; 25; 25; 25; 25; 25; 25; 25; 25 INJECTION, SOLUTION INTRAMUSCULAR; SUBCUTANEOUS at 05:25

## 2019-01-01 RX ADMIN — FUROSEMIDE 40 MG: 10 INJECTION, SOLUTION INTRAMUSCULAR; INTRAVENOUS at 17:36

## 2019-01-01 RX ADMIN — BENZONATATE 100 MG: 100 CAPSULE ORAL at 09:23

## 2019-01-01 RX ADMIN — HYDROMORPHONE HYDROCHLORIDE 0.5 MG: 1 INJECTION, SOLUTION INTRAMUSCULAR; INTRAVENOUS; SUBCUTANEOUS at 00:41

## 2019-01-01 RX ADMIN — PANTOPRAZOLE SODIUM 40 MG: 40 TABLET, DELAYED RELEASE ORAL at 05:16

## 2019-01-01 RX ADMIN — BENZONATATE 100 MG: 100 CAPSULE ORAL at 20:14

## 2019-01-01 NOTE — PHYSICIAN ADVISOR
Current patient class: Inpatient  The patient is currently on Hospital Day: 2 at [address]      The patient was admitted to the hospital at 25 [id number] on 12/31/18 for the following diagnosis:  Chest pain [R07 9]  Kidney failure [N19]  Fatigue [R53 83]  Dyspnea [R06 00]  Congestive heart failure (CHF) ([address] ) [I50 9]       There is documentation in the medical record of an expected length of stay of at least 2 midnights  The patient is therefore expected to satisfy the 2 midnight benchmark and given the 2 midnight presumption is appropriate for INPATIENT ADMISSION  Given this expectation of a satisfying stay, CMS instructs us that the patient is most often appropriate for inpatient admission under part A provided medical necessity is documented in the chart  After review of the relevant documentation, labs, vital signs and test results, the patient is appropriate for INPATIENT ADMISSION  Admission to the hospital as an inpatient is a complex decision making process which requires the practitioner to consider the patients presenting complaint, history and physical examination and all relevant testing  With this in mind, in this case, the patient was deemed appropriate for INPATIENT ADMISSION  After review of the documentation and testing available at the time of the admission I concur with this clinical determination of medical necessity  Rationale is as follows: The patient is a 72 yrs old Male who presented to the ED at 12/31/2018 12:47 PM with a chief complaint of Chest Pain (pt c/o mid-chest pain since last night  cough  EMS administered 324 ASA  and SL nitrox3)   The patient is a 68-year-old male who presented with chest pain and shortness of breath  He was admitted for acute congestive heart failure  He was evaluated by Cardiology and Nephrology  He is on IV Lasix b i d  BNP was significantly elevated  He is on supplemental oxygen   Severity of illness and intensity of service warrant inpatient LOC given expected LOS >2 days  The patients vitals on arrival were ED Triage Vitals [12/31/18 1252]   Temperature Pulse Respirations Blood Pressure SpO2   97 9 °F (36 6 °C) 95 19 148/83 98 %      Temp Source Heart Rate Source Patient Position - Orthostatic VS BP Location FiO2 (%)   Oral Monitor Lying Right arm --      Pain Score       Worst Possible Pain           Past Medical History:   Diagnosis Date    CHF (congestive heart failure) (HCC)     Diabetes mellitus (HCC)     Dysphagia     Hyperlipidemia     Hypertension     Myocardial infarction [facility])      Past Surgical History:   Procedure Laterality Date    CATARACT EXTRACTION             Consults have been placed to:   IP CONSULT TO CARDIOLOGY  IP CONSULT TO NEPHROLOGY    Vitals:    01/01/19 0300 01/01/19 0559 01/01/19 0700 01/01/19 1500   BP: 158/81  131/86 152/77   BP Location: Right arm  Right arm Left arm   Pulse: 95  89 95   Resp: 17  18 18   Temp: 98 1 °F (36 7 °C)  97 7 °F (36 5 °C) 98 2 °F (36 8 °C)   TempSrc: Oral  Oral Oral   SpO2: 98%  100% 95%   Weight:  70 6 kg (155 lb 10 3 oz)     Height:           Most recent labs:    Recent Labs      12/31/18   1506   01/01/19   0511   WBC   --    --   7 36   HGB   --    --   9 3*   HCT   --    --   29 5*   PLT   --    < >  205   K   --    --   4 3   CALCIUM   --    --   9 2   BUN   --    --   52*   CREATININE   --    --   4 69*   INR  1 22*   --    --    TROPONINI   --    < >  0 06*   AST   --    --   16   ALT   --    --   17   ALKPHOS   --    --   73    < > = values in this interval not displayed         Scheduled Meds:  Current Facility-Administered Medications:  aspirin 81 mg Oral Daily Julia Raymundo MD   atorvastatin 10 mg Oral Daily With Anastacia Baig MD   benzonatate 100 mg Oral TID Thalia Figueroa MD   furosemide 40 mg Intravenous BID (diuretic) Thalia Figueroa MD   heparin (porcine) 5,000 Units Subcutaneous [facility] Julia Skye Spann MD   pantoprazole 40 mg Oral Early Morning Jose Antonio Oliveira MD     Continuous Infusions:   PRN Meds:      Surgical procedures (if appropriate):

## 2019-01-01 NOTE — PROGRESS NOTES
Was paged by nursing staff regarding patient with suicidal thoughts  Pt seen and evaluated at bedside  Pt reports that he currently does not have any SI or HI  Denies any plan  Does report that at times he does have suicidal thoughts, he does not see a psychiatrist as an outpatient  No need for 1:1 currently, would continue to monitor closely  Will likely need outpatient psych follow up      Aureliano Sorto PA-C

## 2019-01-01 NOTE — UTILIZATION REVIEW
Initial Clinical Review    Admission: Date/Time/Statement: 12/31/18 @ 1643     Orders Placed This Encounter   Procedures    Inpatient Admission (expected length of stay for this patient is greater than two midnights)     Standing Status:   Standing     Number of Occurrences:   1     Order Specific Question:   Admitting Physician     Answer:   Liana Stockton [[id number]]     Order Specific Question:   Level of Care     Answer:   Med Surg [16]     Order Specific Question:   Estimated length of stay     Answer:   More than 2 Midnights     Order Specific Question:   Certification     Answer:   I certify that inpatient services are medically necessary for this patient for a duration of greater than two midnights  See H&P and MD Progress Notes for additional information about the patient's course of treatment  ED: Date/Time/Mode of Arrival:   ED Arrival Information     Expected Arrival Acuity Means of Arrival Escorted By Service Admission Type    - 12/31/2018 12:47 Emergent Ambulance [facility] Medicine Emergency    Arrival Complaint    SOB / Chest  Pain          Chief Complaint:   Chief Complaint   Patient presents with    Chest Pain     pt c/o mid-chest pain since last night  cough  EMS administered 324 ASA  and SL nitrox3       History of Illness: Lul Boogie is a 72 y o  male significant past medical history of hyperlipidemia, HTN, CAD, cardiomyopathy, who was recently discharged from [facility] after patient was admitted for non ST MI and acute on chronic kidney disease  Patient cancer emergency department complaining of increasing short of breath for the last 24 hour, worse on extension, requiring oxygen supplementation, patient also states he has been having some chest pain, comes and goes    Patient states he has been having  generalized weakness  decrease of appetite    ED Vital Signs:   ED Triage Vitals [12/31/18 1252]   Temperature Pulse Respirations Blood Pressure SpO2   97 9 °F (36 6 °C) 95 19 148/83 98 %      Temp Source Heart Rate Source Patient Position - Orthostatic VS BP Location FiO2 (%)   Oral Monitor Lying Right arm --      Pain Score       Worst Possible Pain        Wt Readings from Last 1 Encounters:   01/01/19 70 6 kg (155 lb 10 3 oz)       Vital Signs (abnormal):    above    Abnormal Labs/Diagnostic Test Results:    BNP   22,385  PT  15 3        INR   1 22  NA  129  Bun/Creat    53/4 73  Troponin   0 06  Albumin  3 4  H/H    11 2/34 5  Ct  Chest/abd:   Cardiomegaly with moderate bilateral pleural effusions and overlying compressive atelectasis   Clinical correlation for congestive heart failure recommended  2   2 mm right upper lobe nodule  Based on current Fleischner Society 2017 Guidelines on incidental pulmonary nodule, no routine follow-up is needed if the patient is considered low risk for lung cancer   If the patient is considered high risk for lung   cancer, 12 month follow-up non-contrast chest CT is recommended  3   No acute abnormality in the abdomen or pelvis  4   Cholelithiasis  CXR:  Right-sided infiltrates suspicious for developing pneumonia as well as trace pleural effusions  Questionable cardiomegaly    ED Treatment:   Medication Administration from 12/31/2018 1247 to 12/31/2018 1932     None          Past Medical/Surgical History:    Active Ambulatory Problems     Diagnosis Date Noted    No Active Ambulatory Problems     Resolved Ambulatory Problems     Diagnosis Date Noted    No Resolved Ambulatory Problems     Past Medical History:   Diagnosis Date    [facility]     Diabetes mellitus [facility]     Dysphagia     Hyperlipidemia     Hypertension     Myocardial infarction [facility])        Admitting Diagnosis: Chest pain [R07 9]  Kidney failure [N19]  Fatigue [R53 83]  Dyspnea [R06 00]  Congestive heart failure (CHF) (HCC) [I50 9]    Age/Sex: 72 y o  male    Assessment/Plan: Short of breath:likely secondary to acute decompensation of cardiomyopathy with low ejection fraction vs bronchopneumonia process, chest x-ray with  Right-sided infiltrates suspicious for developing pneumonia as well as trace pleural effusions    -Observe off antibiotic no fever, No elevation of WBC, patient looks acutely ill consider to start antibiotic after procalcitonin  -Procalcitonin  -input and output  -IV Lasix  -continue telemetric  -cardiology Nephrology recommendation  -Please see chart from another Hospital for echo report and endoscopy, as well as previous discharge summary      · NATALIIA/CKDIV:  Patient was discharged on torsemide  Appears to be overload  Per record patient refused to be on hemodialysis which at this point he could be conceded  Nephrology recommendation  · Chest pain:  most likely secondary to acute decompensation of cardiomyopathy  Follow further Cardiology recommendation  · CAD:  Status post nonSTMI per clinical our record in the chart  Patient was no candidate for cardiac catheterization as per record  Continue secondary prevention and follow-up cardiology recommendation  · GERD with spha: continue PPI     VTE Prophylaxis: Heparin  / sequential compression device   Code Status: full code  POLST: There is no POLST form on file for this patient (pre-hospital)     Anticipated Length of Stay:  Patient will be admitted on an Inpatient basis with an anticipated length of stay of  > 2 midnights     Justification for Hospital Stay:  Short of breath secondary to acute decompensation of cardiomyopathy    Admission Orders:    IP  12/31  @    1643  Scheduled Meds:   Current Facility-Administered Medications:  aspirin 81 mg Oral Daily Be Newton MD   atorvastatin 10 mg Oral Daily With Catrachito Alva MD   benzonatate 100 mg Oral TID Be Newton MD   furosemide 40 mg Intravenous BID (diuretic) Be Newton MD   heparin (porcine) 5,000 Units Subcutaneous Q8H [address] [facility] Violet Salomon MD   pantoprazole 40 mg Oral Early Morning Sandra Smith MD     Continuous Infusions:    PRN Meds:     Tele  Cardiac  Diet  Serial troponin  Cons nephrology  Cons cardiology  O2  2L

## 2019-01-01 NOTE — PROGRESS NOTES
[facility] Internal Medicine Progress Note  Patient: Michaela Bennett 72 y o  male   MRN: [id number]  PCP: No primary care provider on file  Unit/Bed#: -01 Encounter: [id number]  Date Of Visit: 01/01/19    Assessment:    Principal Problem:    Shortness of breath  Active Problems:    Chest pain    NATALIIA (acute kidney injury) ([email or web address] )    CKD (chronic kidney disease)    Cardiomyopathy ([email or web address] )      Plan:    Short of breath:likely secondary to acute decompensation of cardiomyopathy with low ejection fraction vs bronchopneumonia process, chest x-ray with  Right-sided infiltrates suspicious for developing pneumonia as well as trace pleural effusions    -Continue Observing off antibiotic no fever, No elevation of WBC, patient looks acutely ill consider to start antibiotic after procalcitonin  -F/U Procalcitonin  -input and output  -Continue IV Lasix  -continue telemetric  -F/U cardiology and further Nephrology recommendation  -Please see chart from another Hospital for echo report and endoscopy, as well as previous discharge summary      · NATALIIA/CKDIV: Mild decrease of creatinine Patient was discharged on torsemide  Appears to be overload  Per record patient refused to be on hemodialysis which at this point he could be conceded  Nephrology recommendation  · Chest pain: improved  most likely secondary to acute decompensation of cardiomyopathy  Follow further Cardiology recommendation  · CAD:  Status post nonSTMI per clinical our record in the chart  Patient was no candidate for cardiac catheterization as per record  Continue secondary prevention and follow-up cardiology recommendation  · GERD with spha: continue PPI       VTE Pharmacologic Prophylaxis:   Pharmacologic: Heparin  Mechanical VTE Prophylaxis in Place: Yes    Patient Centered Rounds: I have performed bedside rounds with nursing staff today      Discussions with Specialists or Other Care Team Provider:  Nephrology    Education and Discussions with Family / Patient:  Patient    Time Spent for Care: 20 minutes  More than 50% of total time spent on counseling and coordination of care as described above  Current Length of Stay: 1 day(s)    Current Patient Status: Inpatient   Certification Statement: The patient will continue to require additional inpatient hospital stay due to Acute above condition IV Lasix    Discharge Plan / Estimated Discharge Date:  Patient is not stable to be discharged, with depends on clinical course    Code Status: Level 1 - Full Code      Subjective:   Patient states chest pain have improved  Still feels short of breath  He has significant decrease of appetite  Objective:     Vitals:   Temp (24hrs), Av °F (36 7 °C), Min:97 7 °F (36 5 °C), Max:98 4 °F (36 9 °C)    Temp:  [97 7 °F (36 5 °C)-98 4 °F (36 9 °C)] 97 7 °F (36 5 °C)  HR:  [80-95] 89  Resp:  [17-22] 18  BP: (131-169)/(74-95) 131/86  SpO2:  [98 %-100 %] 100 %  Body mass index is 25 9 kg/m²  Input and Output Summary (last 24 hours): Intake/Output Summary (Last 24 hours) at 19 1132  Last data filed at 19 0700   Gross per 24 hour   Intake              505 ml   Output             1450 ml   Net             -945 ml       Physical Exam:     Physical Exam   Constitutional: He is oriented to person, place, and time  He appears ill  No distress  On NC   Cardiovascular: Exam reveals no gallop and no friction rub  No murmur heard  Pulmonary/Chest: No respiratory distress  He has no wheezes  He has rales  He exhibits no tenderness  Abdominal: He exhibits no distension and no mass  There is no tenderness  There is no rebound and no guarding  Neurological: He is alert and oriented to person, place, and time  Skin: He is not diaphoretic  There is pallor             Additional Data:     Labs:      Results from last 7 days  Lab Units 19  0511   WBC Thousand/uL 7 36   HEMOGLOBIN g/dL 9 3*   HEMATOCRIT % 29 5*   PLATELETS Thousands/uL 205   NEUTROS PCT % 65 LYMPHS PCT % 25   MONOS PCT % 8   EOS PCT % 1       Results from last 7 days  Lab Units 01/01/19  0511   POTASSIUM mmol/L 4 3   CHLORIDE mmol/L 96*   CO2 mmol/L 27   BUN mg/dL 52*   CREATININE mg/dL 4 69*   CALCIUM mg/dL 9 2   ALK PHOS U/L 73   ALT U/L 17   AST U/L 16       Results from last 7 days  Lab Units 12/31/18  1506   INR  1 22*       * I Have Reviewed All Lab Data Listed Above  * Additional Pertinent Lab Tests Reviewed: All Labs Within Last 24 Hours Reviewed    Imaging:    Imaging Reports Reviewed Today Include:   Imaging Personally Reviewed by Myself Includes:      Recent Cultures (last 7 days):           Last 24 Hours Medication List:     Current Facility-Administered Medications:  aspirin 81 mg Oral Daily Lashell Bowman MD   atorvastatin 10 mg Oral Daily With Edilberto Linares MD   benzonatate 100 mg Oral TID Lashell Bowman MD   furosemide 40 mg Intravenous BID (diuretic) Lashell Bowman MD   heparin (porcine) 5,000 Units Subcutaneous Q8H [facility] Lashell Bowman MD   pantoprazole 40 mg Oral Early Morning Lashell Bowman MD        Today, Patient Was Seen By: Lashell Bowman MD    ** Please Note: This note has been constructed using a voice recognition system   **

## 2019-01-01 NOTE — PLAN OF CARE
CARDIOVASCULAR - ADULT     Maintains optimal cardiac output and hemodynamic stability Progressing     Absence of cardiac dysrhythmias or at baseline rhythm Progressing        DISCHARGE PLANNING     Discharge to home or other facility with appropriate resources Progressing        INFECTION - ADULT     Absence or prevention of progression during hospitalization Progressing        Knowledge Deficit     Patient/family/caregiver demonstrates understanding of disease process, treatment plan, medications, and discharge instructions Progressing        METABOLIC, FLUID AND ELECTROLYTES - ADULT     Electrolytes maintained within normal limits Progressing     Fluid balance maintained Progressing     Glucose maintained within target range Progressing        Nutrition/Hydration-ADULT     Nutrient/Hydration intake appropriate for improving, restoring or maintaining nutritional needs Progressing        PAIN - ADULT     Verbalizes/displays adequate comfort level or baseline comfort level Progressing        Potential for Falls     Patient will remain free of falls Progressing        Prexisting or High Potential for Compromised Skin Integrity     Skin integrity is maintained or improved Progressing        RESPIRATORY - ADULT     Achieves optimal ventilation and oxygenation Progressing        SAFETY ADULT     Maintain or return to baseline ADL function Progressing     Maintain or return mobility status to optimal level Progressing

## 2019-01-01 NOTE — CONSULTS
NEPHROLOGY CONSULTATION NOTE    Patient: Cali Turner               Sex: male          DOA: 12/31/2018 12:47 PM   YOB: 1945        Age:  72 y o         LOS:  LOS: 1 day     REFERRING PHYSICIAN: Nena Crowe MD      REASON FOR THE REFERRAL / CONSULTATION:  Further management of NATALIIA    DATE OF CONSULTATION / SERVICE: 1/1/2019    ADMISSION DIAGNOSIS: Shortness of breath     CHIEF COMPLAINT     I have shortness of breath    HPI     This is a 82-year-old male with a past medical history significant for hyperlipidemia, diabetes type 2, CHF was admitted to the hospital for further management of shortness of breath  On admission patient was found to have elevated creatinine and Nephrology were consulted for further management of NATALIIA  Upon review of old medical records patient's baseline creatinine level is unknown  Current creatinine is 4 69  Patient said that he was recently admitted to [facility]   Patient said that he was admitted there for shortness of breath and spent almost 2 weeks in the hospital   Patient's breathing has slightly improved prior to getting to discharge but also mentioned that dialysis has been discussed during previous hospital stay  Patient said that he has diabetes type 2 and has been taking insulin for long time  Exact control of diabetes is unknown  Patient also have congestive heart failure and initial x-ray showed signs of volume overload and currently patient is on Lasix 40 mg IV BID  Patient's breathing was labored on examination  Patient said that he gets short of breath with minimal activity  Pending cardiology consultation  Patient currently denies chest pain, dizziness, nausea or vomiting      PAST MEDICAL HISTORY     Past Medical History:   Diagnosis Date    [facility] [address] [facility]     [facility]     Dysphagia     Hyperlipidemia     Hypertension     [facility]        PAST SURGICAL HISTORY     Past Surgical History:   Procedure Laterality Date    CATARACT EXTRACTION         ALLERGIES     No Known Allergies    SOCIAL HISTORY     History   Alcohol Use No     History   Drug Use No     History   Smoking Status    Former Smoker   Smokeless Tobacco    Never Used       FAMILY HISTORY     History reviewed  No pertinent family history  CURRENT MEDICATIONS       Current Facility-Administered Medications:     aspirin chewable tablet 81 mg, 81 mg, Oral, Daily, Julia Raymundo MD, 81 mg at 01/01/19 0923    atorvastatin (LIPITOR) tablet 10 mg, 10 mg, Oral, Daily With Burgess Alec MD    benzonatate (TESSALON PERLES) capsule 100 mg, 100 mg, Oral, TID, Joanne Alan MD, 100 mg at 01/01/19 0923    furosemide (LASIX) injection 40 mg, 40 mg, Intravenous, BID (diuretic), Joanne Alan MD, 40 mg at 01/01/19 1110    heparin (porcine) subcutaneous injection 5,000 Units, 5,000 Units, Subcutaneous, Q8H [address], 5,000 Units at 01/01/19 0516 **AND** Platelet count, , , Once, Joanne Alan MD    pantoprazole (PROTONIX) EC tablet 40 mg, 40 mg, Oral, Early Morning, Joanne Alan MD, 40 mg at 01/01/19 0516    REVIEW OF SYSTEMS     Complete 10 points of review of systems were obtained and discussed in length with patient today  Complete 10 points of review of systems were negative/unremarkable except mentioned in the HPI section  OBJECTIVE     Current Weight: Weight - Scale: 70 6 kg (155 lb 10 3 oz)  Vitals:    01/01/19 0700   BP: 131/86   Pulse: 89   Resp: 18   Temp: 97 7 °F (36 5 °C)   SpO2: 100%     Body mass index is 25 9 kg/m²  Intake/Output Summary (Last 24 hours) at 01/01/19 1205  Last data filed at 01/01/19 0700   Gross per 24 hour   Intake              505 ml   Output             1450 ml   Net             -945 ml       PHYSICAL EXAMINATION     Physical Exam   Constitutional: He is oriented to person, place, and time   He appears distressed  unable to complete full sentences due to shortness of breath   HENT:   Head: Atraumatic  Eyes: Conjunctivae are normal  No scleral icterus  Neck: Neck supple  JVD present  No thyromegaly present  Cardiovascular: Normal rate  Pulmonary/Chest: He is in respiratory distress  He has rales  Abdominal: Soft  He exhibits distension  Musculoskeletal: He exhibits no edema  Lymphadenopathy:     He has no cervical adenopathy  Neurological: He is oriented to person, place, and time  Skin: Skin is warm  No cyanosis  Psychiatric: He has a normal mood and affect  His behavior is normal          LAB RESULTS          Results from last 7 days  Lab Units 01/01/19  0511 12/31/18 2127 12/31/18  1259   WBC Thousand/uL 7 36  --  9 46   HEMOGLOBIN g/dL 9 3*  --  11 2*   HEMATOCRIT % 29 5*  --  34 5*   PLATELETS Thousands/uL 205 236 257   POTASSIUM mmol/L 4 3  --  5 1   CHLORIDE mmol/L 96*  --  93*   CO2 mmol/L 27  --  27   BUN mg/dL 52*  --  53*   CREATININE mg/dL 4 69*  --  4 73*   EGFR ml/min/1 73sq m 12  --  12   CALCIUM mg/dL 9 2  --  9 8       I have personally reviewed the old medical records and patient's previously known baseline creatinine level is unknown    RADIOLOGY RESULTS     Results for orders placed during the hospital encounter of 12/31/18   X-ray chest 1 view portable    Narrative CHEST     INDICATION:   chest pain  Cough    COMPARISON:  None    EXAM PERFORMED/VIEWS:  XR CHEST PORTABLE      FINDINGS:    There is a suboptimal inspiration  This may partly explain the prominent appearance of the heart  Cardiomegaly not excluded  There is an ill-defined right lung infiltrate in the infrahilar region and there appear to be trace pleural effusions  Left lung clear  No pneumothorax identified  Osseous structures appear within normal limits for patient age        Impression Right-sided infiltrates suspicious for developing pneumonia as well as trace pleural effusions  Questionable cardiomegaly       Limited inspiration may exaggerate some of the findings  Suggest continued follow-up preferably with deeper inspiratory effort with PA and lateral views  Workstation performed: [id number]       12 lead EKG done on 12/31/2018 showed sinus rhythm with first-degree AV block  PLAN / RECOMMENDATIONS      1  NATALIIA  Present on admission, suspected due to cardiorenal syndrome  Upon review of old medical records patient's baseline creatinine level is unknown  Patient said that he was recently discharged from [address] with similar complaint and during that previous hospital stay physician has also discuss consideration of dialysis  Patient's current creatinine is 4 69  Patient was admitted with shortness of breath and currently showing signs of volume overload (congested chest x-ray plus elevated BNP)  Currently patient is on Lasix 40 mg IV BID and seems nonoliguric since admission  Patient is also getting short of breath upon talking and unable to complain full sentences  Since patient is nonoliguric, will continue current dose of Lasix for time being  Plan to check urine lytes, CK, PTH, vitamin-D, phosphorus, iron panel and hemoglobin A1c for further evaluation  I have discussed dialysis in length with patient regarding further management if breathing fails to improve with diuretics or renal function continue to worsen  I have discussed consideration of dialysis in length with patient today but patient has expressed himself and said that he will not take dialysis even though it would be life-saving procedure  Admission CT scan of abdomen did not show hydronephrosis  As mentioned earlier, since patient has been nonoliguric will continue current dose of Lasix and monitor strict I's & O's   Plan to recheck renal function with AM labs  Consider palliative/hospice care if renal function gets worse       2  Hyponatremia    Secondary to volume overload, sodium level has improved to 133 today  Continue current dose of Lasix as mentioned earlier  Monitor sodium level  Thank you for the consultation to participate in patient's care  I have personally discussed my plan with the referring physician  Dexter Traylor MD  Nephrology  1/1/2019        Portions of the record may have been created with voice recognition software  Occasional wrong word or "sound a like" substitutions may have occurred due to the inherent limitations of voice recognition software  Read the chart carefully and recognize, using context, where substitutions have occurred

## 2019-01-01 NOTE — PLAN OF CARE
CARDIOVASCULAR - ADULT     Maintains optimal cardiac output and hemodynamic stability Progressing     Absence of cardiac dysrhythmias or at baseline rhythm Progressing        DISCHARGE PLANNING     Discharge to home or other facility with appropriate resources Progressing        INFECTION - ADULT     Absence or prevention of progression during hospitalization Progressing        Knowledge Deficit     Patient/family/caregiver demonstrates understanding of disease process, treatment plan, medications, and discharge instructions Progressing        METABOLIC, FLUID AND ELECTROLYTES - ADULT     Electrolytes maintained within normal limits Progressing     Fluid balance maintained Progressing     Glucose maintained within target range Progressing        PAIN - ADULT     Verbalizes/displays adequate comfort level or baseline comfort level Progressing        Potential for Falls     Patient will remain free of falls Progressing        RESPIRATORY - ADULT     Achieves optimal ventilation and oxygenation Progressing        SAFETY ADULT     Maintain or return to baseline ADL function Progressing     Maintain or return mobility status to optimal level Progressing

## 2019-01-01 NOTE — CONSULTS
Consultation - Cardiology  Jasson Taylor 72 y o  male MRN: [id number]  Unit/Bed#: -01 Encounter: [id number]    Inpatient consult to Cardiology  Consult performed by: Ray Marino ordered by: Johanny Lopez          Physician Requesting Consult: Christian Luciano,*  Reason for Consult / Principal Problem:  Congestive heart failure    HPI:  72-year-old male with past medical history of hypertension, diabetes, hyperlipidemia and chronic kidney disease comes to hospital with history of worsening shortness of breath, lower extremity edema, orthopnea  He was recently admitted to [facility] for CHF exacerbation and was discharged home on torsemide  Patient has apparently refused dialysis in the past   Patient was given IV Lasix since yesterday following which he has diuresed well with significant improvement in shortness of breath and lower extremity edema  Serum creatinine is significantly elevated but has remained stable  Patient denies having any chest pain at this time  Troponins are mildly elevated but flat      EKG shows normal sinus rhythm with nonspecific T-wave changes    REVIEW OF SYSTEMS:  Constitutional:  Denies fever or chills   Eyes:  Denies change in visual acuity   HENT:  Denies nasal congestion or sore throat   Respiratory:  Complains of shortness of breath  Cardiovascular:  Lower extremity edema, orthopnea and weight gain  GI:  Denies abdominal pain, nausea, vomiting, bloody stools or diarrhea   :  Denies dysuria, frequency, difficulty in micturition and nocturia  Musculoskeletal:  Denies back pain or joint pain   Neurologic:  Denies headache, focal weakness or sensory changes   Endocrine:  Denies polyuria or polydipsia   Lymphatic:  Denies swollen glands   Psychiatric:  Denies depression or anxiety     Historical Information   Past Medical History:   Diagnosis Date    [facility]     [facility]     Dysphagia     Hyperlipidemia     Hypertension     Myocardial infarction [facility])      Past Surgical History:   Procedure Laterality Date    CATARACT EXTRACTION       History   Alcohol Use No     History   Drug Use No     History   Smoking Status    Former Smoker   Smokeless Tobacco    Never Used       Family History: non-contributory    MEDS & ALLERGIES:  all current active meds have been reviewed and current meds: Current Facility-Administered Medications   Medication Dose Route Frequency    aspirin chewable tablet 81 mg  81 mg Oral Daily    atorvastatin (LIPITOR) tablet 10 mg  10 mg Oral Daily With Dinner    benzonatate (TESSALON PERLES) capsule 100 mg  100 mg Oral TID    furosemide (LASIX) injection 40 mg  40 mg Intravenous BID (diuretic)    heparin (porcine) subcutaneous injection 5,000 Units  5,000 Units Subcutaneous Q8H [facility]    pantoprazole (PROTONIX) EC tablet 40 mg  40 mg Oral Early Morning        No Known Allergies    OBJECTIVE:  Vitals:   Vitals:    01/01/19 0700   BP: 131/86   Pulse: 89   Resp: 18   Temp: 97 7 °F (36 5 °C)   SpO2: 100%     Body mass index is 25 9 kg/m²  Systolic (53VFD), SQJ:620 , Min:131 , AYZ:042     Diastolic (84LJX), YXT:91, Min:74, Max:95      Intake/Output Summary (Last 24 hours) at 01/01/19 1326  Last data filed at 01/01/19 0700   Gross per 24 hour   Intake                5 ml   Output             1450 ml   Net            -1445 ml     Weight (last 2 days)     Date/Time   Weight    01/01/19 0559  70 6 (155 65)    12/31/18 1252  70 6 (155 65)            Invasive Devices     Peripheral Intravenous Line            Peripheral IV 12/31/18 Left Antecubital 1 day    Peripheral IV 12/31/18 Right Wrist less than 1 day                PHYSICAL EXAMS:  General:  Patient is not in acute distress, laying in the bed comfortably, awake, alert responding to commands  Head: Normocephalic, Atraumatic     HEENT: White sclera, pink conjunctiva,  PERRLA,pharynx benign  Neck:  Supple, no neck vein distention, carotids+2/+2 no bruits, thyromegaly, adenopathy  Respiratory: clear to P/A  Cardiovascular:  PMI normal, S1-S2 normal, No  Murmurs, thrills, gallops, rubs   Regular rhythm  GI:  Abdomen soft nontender   No hepatosplenomegaly, adenopathy, ascites,or rebound tenderness  Extremities: No edema, normal pulses, no calf tenderness, no joint deformities, no venous disease   Integument:  No skin rashes or ulceration  Lymphatic:  No cervical or inguinal lymphadenopathy  Neurologic:  Patient is awake alert, responding to command, well-oriented to time and place and person moving all extremities    LABORATORY RESULTS:    Results from last 7 days  Lab Units 01/01/19  0511 01/01/19 0021 12/31/18 2127   TROPONIN I ng/mL 0 06* 0 05* 0 06*     CBC with diff:   Results from last 7 days  Lab Units 01/01/19  0511 12/31/18 2127 12/31/18  1259   WBC Thousand/uL 7 36  --  9 46   HEMOGLOBIN g/dL 9 3*  --  11 2*   HEMATOCRIT % 29 5*  --  34 5*   MCV fL 82  --  80*   PLATELETS Thousands/uL 205 236 257   MCH pg 26 0*  --  26 0*   MCHC g/dL 31 5  --  32 5   RDW % 16 2*  --  16 3*   MPV fL 10 9 10 8 10 7   NRBC AUTO /100 WBCs 0  --  0       CMP:  Results from last 7 days  Lab Units 01/01/19  0511 12/31/18  1259   POTASSIUM mmol/L 4 3 5 1   CHLORIDE mmol/L 96* 93*   CO2 mmol/L 27 27   BUN mg/dL 52* 53*   CREATININE mg/dL 4 69* 4 73*   CALCIUM mg/dL 9 2 9 8   AST U/L 16 14   ALT U/L 17 23   ALK PHOS U/L 73 90   EGFR ml/min/1 73sq m 12 12       BMP:  Results from last 7 days  Lab Units 01/01/19  0511 12/31/18  1259   POTASSIUM mmol/L 4 3 5 1   CHLORIDE mmol/L 96* 93*   CO2 mmol/L 27 27   BUN mg/dL 52* 53*   CREATININE mg/dL 4 69* 4 73*   CALCIUM mg/dL 9 2 9 8         Results from last 7 days  Lab Units 12/31/18  1259   NT-PRO BNP pg/mL 22,385*                    Results from last 7 days  Lab Units 12/31/18  1506   INR  1 22*       Lipid Profile:   No results found for: CHOL  No results found for: HDL  No results found for: LDLCALC  No results found for: TRIG    Cardiac testing:   No results found for this or any previous visit  No results found for this or any previous visit  No procedure found  No results found for this or any previous visit  Imaging:   I have personally reviewed pertinent reports  EKG reviewed personally:  Normal sinus rhythm, nonspecific T-wave changes, left atrial enlargement    Telemetry reviewed personally:   No arrhythmias    Assessment/Plan:  1  Acute on chronic CHF exacerbation:  Unknown type, systolic versus diastolic  Patient is responding well to diuresis  Continue Lasix  Limited echocardiogram to evaluate LVEF  Watch serum creatinine and electrolytes    2  NSTEMI, probably secondary to demand ischemia from CHF  If LV systolic function is decreased, he might need stress test to evaluate for ischemia at some point  Not the best candidate for cardiac catheterization  Code Status: Level 1 - Full Code    Counseling / Coordination of Care  Total floor / unit time spent today 45 minutes  Greater than 50% of total time was spent with the patient and / or family counseling and / or coordination of care  A description of the counseling / coordination of care: Review of history, current assessment, development of a plan      Rosy Reid MD  1/1/2019,1:26 PM

## 2019-01-02 LAB
25(OH)D3 SERPL-MCNC: 28.5 NG/ML (ref 30–100)
ANION GAP SERPL CALCULATED.3IONS-SCNC: 12 MMOL/L (ref 4–13)
BASOPHILS # BLD AUTO: 0.06 THOUSANDS/ΜL (ref 0–0.1)
BASOPHILS NFR BLD AUTO: 1 % (ref 0–1)
BUN SERPL-MCNC: 58 MG/DL (ref 5–25)
CALCIUM SERPL-MCNC: 9.6 MG/DL (ref 8.3–10.1)
CHLORIDE SERPL-SCNC: 94 MMOL/L (ref 100–108)
CK SERPL-CCNC: 85 U/L (ref 39–308)
CO2 SERPL-SCNC: 25 MMOL/L (ref 21–32)
CREAT SERPL-MCNC: 4.78 MG/DL (ref 0.6–1.3)
EOSINOPHIL # BLD AUTO: 0.02 THOUSAND/ΜL (ref 0–0.61)
EOSINOPHIL NFR BLD AUTO: 0 % (ref 0–6)
ERYTHROCYTE [DISTWIDTH] IN BLOOD BY AUTOMATED COUNT: 16.3 % (ref 11.6–15.1)
EST. AVERAGE GLUCOSE BLD GHB EST-MCNC: 189 MG/DL
FERRITIN SERPL-MCNC: 208 NG/ML (ref 8–388)
GFR SERPL CREATININE-BSD FRML MDRD: 11 ML/MIN/1.73SQ M
GLUCOSE SERPL-MCNC: 132 MG/DL (ref 65–140)
HBA1C MFR BLD: 8.2 % (ref 4.2–6.3)
HCT VFR BLD AUTO: 33.2 % (ref 36.5–49.3)
HGB BLD-MCNC: 10.7 G/DL (ref 12–17)
IMM GRANULOCYTES # BLD AUTO: 0.04 THOUSAND/UL (ref 0–0.2)
IMM GRANULOCYTES NFR BLD AUTO: 0 % (ref 0–2)
IRON SATN MFR SERPL: 16 %
IRON SERPL-MCNC: 42 UG/DL (ref 65–175)
LYMPHOCYTES # BLD AUTO: 1.23 THOUSANDS/ΜL (ref 0.6–4.47)
LYMPHOCYTES NFR BLD AUTO: 12 % (ref 14–44)
MCH RBC QN AUTO: 26 PG (ref 26.8–34.3)
MCHC RBC AUTO-ENTMCNC: 32.2 G/DL (ref 31.4–37.4)
MCV RBC AUTO: 81 FL (ref 82–98)
MONOCYTES # BLD AUTO: 0.7 THOUSAND/ΜL (ref 0.17–1.22)
MONOCYTES NFR BLD AUTO: 7 % (ref 4–12)
NEUTROPHILS # BLD AUTO: 8.13 THOUSANDS/ΜL (ref 1.85–7.62)
NEUTS SEG NFR BLD AUTO: 80 % (ref 43–75)
NRBC BLD AUTO-RTO: 0 /100 WBCS
NT-PROBNP SERPL-MCNC: ABNORMAL PG/ML
PHOSPHATE SERPL-MCNC: 5.1 MG/DL (ref 2.3–4.1)
PLATELET # BLD AUTO: 244 THOUSANDS/UL (ref 149–390)
PMV BLD AUTO: 10.8 FL (ref 8.9–12.7)
POTASSIUM SERPL-SCNC: 5 MMOL/L (ref 3.5–5.3)
PTH-INTACT SERPL-MCNC: 180.2 PG/ML (ref 18.4–80.1)
RBC # BLD AUTO: 4.11 MILLION/UL (ref 3.88–5.62)
SODIUM SERPL-SCNC: 131 MMOL/L (ref 136–145)
TIBC SERPL-MCNC: 267 UG/DL (ref 250–450)
TROPONIN I SERPL-MCNC: 0.17 NG/ML
TROPONIN I SERPL-MCNC: 0.19 NG/ML
WBC # BLD AUTO: 10.18 THOUSAND/UL (ref 4.31–10.16)

## 2019-01-02 PROCEDURE — 80048 BASIC METABOLIC PNL TOTAL CA: CPT | Performed by: INTERNAL MEDICINE

## 2019-01-02 PROCEDURE — 85025 COMPLETE CBC W/AUTO DIFF WBC: CPT | Performed by: INTERNAL MEDICINE

## 2019-01-02 PROCEDURE — 99232 SBSQ HOSP IP/OBS MODERATE 35: CPT | Performed by: INTERNAL MEDICINE

## 2019-01-02 PROCEDURE — 83036 HEMOGLOBIN GLYCOSYLATED A1C: CPT | Performed by: INTERNAL MEDICINE

## 2019-01-02 PROCEDURE — 99233 SBSQ HOSP IP/OBS HIGH 50: CPT | Performed by: INTERNAL MEDICINE

## 2019-01-02 PROCEDURE — 83540 ASSAY OF IRON: CPT | Performed by: INTERNAL MEDICINE

## 2019-01-02 PROCEDURE — 82550 ASSAY OF CK (CPK): CPT | Performed by: INTERNAL MEDICINE

## 2019-01-02 PROCEDURE — 84484 ASSAY OF TROPONIN QUANT: CPT | Performed by: INTERNAL MEDICINE

## 2019-01-02 PROCEDURE — 82728 ASSAY OF FERRITIN: CPT | Performed by: INTERNAL MEDICINE

## 2019-01-02 PROCEDURE — 83550 IRON BINDING TEST: CPT | Performed by: INTERNAL MEDICINE

## 2019-01-02 PROCEDURE — 99232 SBSQ HOSP IP/OBS MODERATE 35: CPT | Performed by: FAMILY MEDICINE

## 2019-01-02 PROCEDURE — 82306 VITAMIN D 25 HYDROXY: CPT | Performed by: INTERNAL MEDICINE

## 2019-01-02 PROCEDURE — 84100 ASSAY OF PHOSPHORUS: CPT | Performed by: INTERNAL MEDICINE

## 2019-01-02 PROCEDURE — 83880 ASSAY OF NATRIURETIC PEPTIDE: CPT | Performed by: INTERNAL MEDICINE

## 2019-01-02 PROCEDURE — 83970 ASSAY OF PARATHORMONE: CPT | Performed by: INTERNAL MEDICINE

## 2019-01-02 RX ORDER — ONDANSETRON 2 MG/ML
4 INJECTION INTRAMUSCULAR; INTRAVENOUS EVERY 6 HOURS PRN
Status: DISCONTINUED | OUTPATIENT
Start: 2019-01-02 | End: 2019-01-08 | Stop reason: HOSPADM

## 2019-01-02 RX ADMIN — ASPIRIN 81 MG 81 MG: 81 TABLET ORAL at 09:15

## 2019-01-02 RX ADMIN — ATORVASTATIN CALCIUM 10 MG: 10 TABLET, FILM COATED ORAL at 15:37

## 2019-01-02 RX ADMIN — PANTOPRAZOLE SODIUM 40 MG: 40 TABLET, DELAYED RELEASE ORAL at 05:06

## 2019-01-02 RX ADMIN — BENZONATATE 100 MG: 100 CAPSULE ORAL at 15:37

## 2019-01-02 RX ADMIN — ONDANSETRON 4 MG: 2 INJECTION INTRAMUSCULAR; INTRAVENOUS at 11:25

## 2019-01-02 RX ADMIN — BENZONATATE 100 MG: 100 CAPSULE ORAL at 22:08

## 2019-01-02 RX ADMIN — FUROSEMIDE 40 MG: 10 INJECTION, SOLUTION INTRAMUSCULAR; INTRAVENOUS at 15:38

## 2019-01-02 RX ADMIN — HEPARIN SODIUM 5000 UNITS: 5000 INJECTION, SOLUTION INTRAVENOUS; SUBCUTANEOUS at 22:08

## 2019-01-02 RX ADMIN — BENZONATATE 100 MG: 100 CAPSULE ORAL at 09:15

## 2019-01-02 RX ADMIN — HEPARIN SODIUM 5000 UNITS: 5000 INJECTION, SOLUTION INTRAVENOUS; SUBCUTANEOUS at 15:37

## 2019-01-02 RX ADMIN — HEPARIN SODIUM 5000 UNITS: 5000 INJECTION, SOLUTION INTRAVENOUS; SUBCUTANEOUS at 05:06

## 2019-01-02 RX ADMIN — FUROSEMIDE 40 MG: 10 INJECTION, SOLUTION INTRAMUSCULAR; INTRAVENOUS at 09:15

## 2019-01-02 NOTE — PROGRESS NOTES
Patient stating pain in chest  happened when he starts to urinate  Pain stopped when he returned to bed  Denies any pain at this moment

## 2019-01-02 NOTE — PROGRESS NOTES
General Cardiology   Progress Note   Indra Lockwood 72 y o  male MRN: [id number]  Unit/Bed#: -01 Encounter: [id number]        Subjective:   Patient appears very lethargic, complaints of severe nausea and vomiting  He also had few episodes of chest pain since admission  Currently chest pain free  He does report improvement in shortness of breath  Patient is diuresing well, serum creatinine is high does remained more or less stable  Objective:   Vitals:  Vitals:    01/02/19 0700   BP: 159/76   Pulse: 101   Resp: 18   Temp: 98 3 °F (36 8 °C)   SpO2: 100%       Body mass index is 28 1 kg/m²  Systolic (68QSX), ROSE MARY:829 , Min:135 , VDA:486     Diastolic (49MGL), QDH:58, Min:67, Max:80      Intake/Output Summary (Last 24 hours) at 01/02/19 1108  Last data filed at 01/02/19 0700   Gross per 24 hour   Intake              120 ml   Output             2050 ml   Net            -1930 ml     Weight (last 2 days)     Date/Time   Weight    01/02/19 0547  76 6 (168 87)    01/02/19 0546  76 6 (168 87)    01/01/19 0559  70 6 (155 65)    12/31/18 1252  70 6 (155 65)              Telemetry Review: No significant arrhythmias seen on telemetry review  PHYSICAL EXAMS:  General:  Patient is not in acute distress, laying in the bed comfortably, awake, alert responding to commands  Head: Normocephalic, Atraumatic  HEENT: White sclera, pink conjunctiva,  PERRLA,pharynx benign  Neck:  Supple, JVD present, carotids+2/+2 no bruits, thyromegaly, adenopathy  Respiratory: clear to P/A, decreased breath sounds  Cardiovascular:  PMI normal, S1-S2 normal, No  Murmurs, thrills, gallops, rubs   Regular rhythm  GI:  Abdomen soft nontender  No hepatosplenomegaly, adenopathy, ascites,or rebound tenderness    Distended  Extremities: No edema, normal pulses, no calf tenderness, no joint deformities, no venous disease   Integument:  No skin rashes or ulceration  Lymphatic:  No cervical or inguinal lymphadenopathy  Neurologic:  Patient is awake alert, responding to command, well-oriented to time and place and person moving all extremities      LABORATORY RESULTS:    Results from last 7 days  Lab Units 01/02/19  0432 01/01/19 0511 01/01/19 0021 12/31/18 2127   CK TOTAL U/L 85  --   --   --    TROPONIN I ng/mL  --  0 06* 0 05* 0 06*     CBC with diff:   Results from last 7 days  Lab Units 01/02/19 0432 01/01/19 0511 12/31/18 2127 12/31/18  1259   WBC Thousand/uL 10 18* 7 36  --  9 46   HEMOGLOBIN g/dL 10 7* 9 3*  --  11 2*   HEMATOCRIT % 33 2* 29 5*  --  34 5*   MCV fL 81* 82  --  80*   PLATELETS Thousands/uL 244 205 236 257   MCH pg 26 0* 26 0*  --  26 0*   MCHC g/dL 32 2 31 5  --  32 5   RDW % 16 3* 16 2*  --  16 3*   MPV fL 10 8 10 9 10 8 10 7   NRBC AUTO /100 WBCs 0 0  --  0       CMP:  Results from last 7 days  Lab Units 01/02/19  0432 01/01/19  0511 12/31/18  1259   POTASSIUM mmol/L 5 0 4 3 5 1   CHLORIDE mmol/L 94* 96* 93*   CO2 mmol/L 25 27 27   BUN mg/dL 58* 52* 53*   CREATININE mg/dL 4 78* 4 69* 4 73*   CALCIUM mg/dL 9 6 9 2 9 8   AST U/L  --  16 14   ALT U/L  --  17 23   ALK PHOS U/L  --  73 90   EGFR ml/min/1 73sq m 11 12 12       BMP:  Results from last 7 days  Lab Units 01/02/19 0432 01/01/19 0511 12/31/18  1259   POTASSIUM mmol/L 5 0 4 3 5 1   CHLORIDE mmol/L 94* 96* 93*   CO2 mmol/L 25 27 27   BUN mg/dL 58* 52* 53*   CREATININE mg/dL 4 78* 4 69* 4 73*   CALCIUM mg/dL 9 6 9 2 9 8         Results from last 7 days  Lab Units 01/02/19 0432 12/31/18  1259   NT-PRO BNP pg/mL 23,856* 22,385*            Results from last 7 days  Lab Units 01/02/19  0432   HEMOGLOBIN A1C % 8 2*           Results from last 7 days  Lab Units 12/31/18  1506   INR  1 22*       Lipid Profile:   No results found for: CHOL  No results found for: HDL  No results found for: LDLCALC  No results found for: TRIG    Cardiac testing:   No results found for this or any previous visit  No results found for this or any previous visit    No results found for this or any previous visit  No procedure found  No results found for this or any previous visit  Meds/Allergies   all current active meds have been reviewed  Prescriptions Prior to Admission   Medication    aspirin 81 mg chewable tablet    atorvastatin (LIPITOR) 10 mg tablet    benzonatate (TESSALON PERLES) 100 mg capsule    METOPROLOL SUCCINATE PO    pantoprazole (PROTONIX) 40 mg tablet    sodium bicarbonate 325 MG tablet    torsemide (DEMADEX) 20 mg tablet            Assessment/Plan:  1  Acute on chronic CHF exacerbation:  Unknown type, systolic versus diastolic  Patient is responding well to diuresis  Continue Lasix  Limited echocardiogram to evaluate LVEF  Watch serum creatinine and electrolytes  Nephrology consult pending     2  Chest pain/NSTEMI,   Positive troponin probably due to acute kidney injury and demand ischemia from CHF  Chest pain is atypical  Limited echo to evaluate for any regional wall motion abnormalities  If LV systolic function is decreased, he might need stress test to evaluate for ischemia at some point  Not the best candidate for cardiac catheterization  Counseling / Coordination of Care  Total floor / unit time spent today 25 minutes  Greater than 50% of total time was spent with the patient and / or family counseling and / or coordination of care  ** Please Note: Dragon 360 Dictation voice to text software may have been used in the creation of this document   **

## 2019-01-02 NOTE — PROGRESS NOTES
[facility] Progress Note  Patient: Gwenette Cogan 72 y o  male   MRN: [id number]  PCP: No primary care provider on file  Unit/Bed#: -01 Encounter: [id number]  Date Of Visit: 01/02/19    Assessment:    Principal Problem:    Shortness of breath  Active Problems:    Chest pain    NATALIIA (acute kidney injury) ([address] )    CKD (chronic kidney disease)    Cardiomyopathy (HCC)    Hyponatremia      Plan:    Short of breath:  Some improvement  likely secondary to acute decompensation of cardiomyopathy with low ejection fraction vs bronchopneumonia process, chest x-ray with  Right-sided infiltrates suspicious for developing pneumonia as well as trace pleural effusions    -Continue Observing off antibiotic no fever, No elevation of WBC, patient looks acutely ill consider to start antibiotic after procalcitonin  -Normal Procalcitonin  -input and output  -Continue IV Lasix  -continue telemetric  -F/U cardiology and further Nephrology recommendation  -Please see chart from another Hospital for echo report and endoscopy, as well as previous discharge summary  Acutely ill appearance  Guarded prognosis     · NATALIIA/CKDIV: elevated today compared as yesterday  Patient was discharged on torsemide   still overload   Per record patient refused to be on hemodialysis which at this point he could be conceded   Nephrology recommendation  · Chest pain: improved  most likely secondary to acute decompensation of cardiomyopathy   Follow further Cardiology recommendation  · CAD:  Status post nonSTMI per clinical our record in the chart  Massiel Limon was no candidate for cardiac catheterization as per record   Continue secondary prevention and follow-up cardiology recommendation  GERD with spha: continue PPI     VTE Pharmacologic Prophylaxis:   Pharmacologic: Heparin  Mechanical VTE Prophylaxis in Place: Yes    Patient Centered Rounds: I have performed bedside rounds with nursing staff today      Discussions with Specialists or Other Care Team Provider:  Nephrology and Cardiology    Education and Discussions with Family / Patient:  Patient    Time Spent for Care: 20 minutes  More than 50% of total time spent on counseling and coordination of care as described above  Current Length of Stay: 2 day(s)    Current Patient Status: Inpatient   Certification Statement: The patient will continue to require additional inpatient hospital stay due to Acute above condition    Discharge Plan / Estimated Discharge Date:  Patient is not stable to be discharged    Code Status: Level 1 - Full Code      Subjective:   Patient states that he feels the same  He was on having son episode of chest pain  I explained the patient regard he is condition he agree on the setting  Patient is requesting to see Nephrology for further recommendation regard dialysis    Objective:     Vitals:   Temp (24hrs), Av 2 °F (36 8 °C), Min:97 8 °F (36 6 °C), Max:98 5 °F (36 9 °C)    Temp:  [97 8 °F (36 6 °C)-98 5 °F (36 9 °C)] 98 3 °F (36 8 °C)  HR:  [] 101  Resp:  [18] 18  BP: (135-161)/(67-80) 159/76  SpO2:  [95 %-100 %] 100 %  Body mass index is 28 1 kg/m²  Input and Output Summary (last 24 hours): Intake/Output Summary (Last 24 hours) at 19 1035  Last data filed at 19 0700   Gross per 24 hour   Intake              120 ml   Output             2050 ml   Net            -1930 ml       Physical Exam:     Physical Exam   Constitutional: He is oriented to person, place, and time  He appears ill  No distress  On NC   Cardiovascular: Exam reveals no gallop and no friction rub  No murmur heard  Pulmonary/Chest: No respiratory distress  He has no wheezes  He has rales  He exhibits no tenderness  Abdominal: He exhibits no distension and no mass  There is no tenderness  There is no rebound and no guarding  Neurological: He is alert and oriented to person, place, and time  Skin: He is not diaphoretic  There is pallor           Additional Data: Labs:      Results from last 7 days  Lab Units 01/02/19  0432   WBC Thousand/uL 10 18*   HEMOGLOBIN g/dL 10 7*   HEMATOCRIT % 33 2*   PLATELETS Thousands/uL 244   NEUTROS PCT % 80*   LYMPHS PCT % 12*   MONOS PCT % 7   EOS PCT % 0       Results from last 7 days  Lab Units 01/02/19  0432 01/01/19  0511   POTASSIUM mmol/L 5 0 4 3   CHLORIDE mmol/L 94* 96*   CO2 mmol/L 25 27   BUN mg/dL 58* 52*   CREATININE mg/dL 4 78* 4 69*   CALCIUM mg/dL 9 6 9 2   ALK PHOS U/L  --  73   ALT U/L  --  17   AST U/L  --  16       Results from last 7 days  Lab Units 12/31/18  1506   INR  1 22*       * I Have Reviewed All Lab Data Listed Above  * Additional Pertinent Lab Tests Reviewed: All Labs Within Last 24 Hours Reviewed    Imaging:    Imaging Reports Reviewed Today Include:   Imaging Personally Reviewed by Myself Includes:      Recent Cultures (last 7 days):           Last 24 Hours Medication List:     Current Facility-Administered Medications:  aspirin 81 mg Oral Daily Enma Ortiz MD   atorvastatin 10 mg Oral Daily With Herman Palafox MD   benzonatate 100 mg Oral TID Enma Ortiz MD   furosemide 40 mg Intravenous BID (diuretic) Enma Ortiz MD   heparin (porcine) 5,000 Units Subcutaneous Q8H [address] Enma Ortiz MD   pantoprazole 40 mg Oral Early Morning Enma Ortiz MD        Today, Patient Was Seen By: Enma Ortiz MD    ** Please Note: This note has been constructed using a voice recognition system   **

## 2019-01-02 NOTE — PROGRESS NOTES
NEPHROLOGY PROGRESS NOTE    Patient: Eugene Baez               Sex: male          DOA: 12/31/2018 12:47 PM   YOB: 1945        Age:  72 y o         LOS:  LOS: 2 days       HPI     Patient admitted the hospital with chest pain and shortness of breath  SUBJECTIVE     He is overall not feeling well  Complaining of weakness  Complaining anorexia and nausea  Complaining better test in her mouth  His breathing is getting worse over a period of time    He claims he is not doing well for a period of time also    He denies ever had a kidney problem in the past   He was hospitalized in [facility] and possible dialysis were discussed there also    CURRENT MEDICATIONS       Current Facility-Administered Medications:     aspirin chewable tablet 81 mg, 81 mg, Oral, Daily, Maxim Roberts MD, 81 mg at 01/02/19 0915    atorvastatin (LIPITOR) tablet 10 mg, 10 mg, Oral, Daily With Fernando Briceno MD, 10 mg at 01/01/19 1736    benzonatate (TESSALON PERLES) capsule 100 mg, 100 mg, Oral, TID, Greg Raymundo MD, 100 mg at 01/02/19 0915    furosemide (LASIX) injection 40 mg, 40 mg, Intravenous, BID (diuretic), Maxim Roberts MD, 40 mg at 01/02/19 0915    heparin (porcine) subcutaneous injection 5,000 Units, 5,000 Units, Subcutaneous, Q8H [facility], 5,000 Units at 01/02/19 0506 **AND** Platelet count, , , Once, Maxim Roberts MD    ondansetron (ZOFRAN) injection 4 mg, 4 mg, Intravenous, Q6H PRN, Maxim Roberts MD, 4 mg at 01/02/19 1125    pantoprazole (PROTONIX) EC tablet 40 mg, 40 mg, Oral, Early Morning, Maxim Roberts MD, 40 mg at 01/02/19 0506    OBJECTIVE     Current Weight: Weight - Scale: 76 6 kg (168 lb 14 oz)  Vitals:    01/02/19 0700   BP: 159/76   Pulse: 101   Resp: 18   Temp: 98 3 °F (36 8 °C)   SpO2: 100%       Intake/Output Summary (Last 24 hours) at 01/02/19 1311  Last data filed at 01/02/19 0930   Gross per 24 hour Intake              300 ml   Output             2050 ml   Net            -1750 ml       PHYSICAL EXAMINATION     Physical Exam   Constitutional: He is oriented to person, place, and time  He appears well-developed  No distress  HENT:   Head: Normocephalic  Mouth/Throat: Oropharynx is clear and moist    Eyes: Pupils are equal, round, and reactive to light  Conjunctivae are normal  No scleral icterus  Neck: Normal range of motion  Neck supple  No JVD present  Cardiovascular: Normal rate and regular rhythm  Murmur heard  Pulmonary/Chest: Effort normal and breath sounds normal  No respiratory distress  He has no wheezes  He has no rales  Abdominal: Soft  Bowel sounds are normal  He exhibits no distension and no mass  There is no tenderness  Musculoskeletal: Normal range of motion  He exhibits no edema  Neurological: He is alert and oriented to person, place, and time  Skin: Skin is warm  No rash noted  Psychiatric: He has a normal mood and affect  His behavior is normal         LAB RESULTS       Results from last 7 days  Lab Units 01/02/19  0432 01/01/19  0511 12/31/18  2127 12/31/18  1259   WBC Thousand/uL 10 18* 7 36  --  9 46   HEMOGLOBIN g/dL 10 7* 9 3*  --  11 2*   HEMATOCRIT % 33 2* 29 5*  --  34 5*   PLATELETS Thousands/uL 244 205 236 257   POTASSIUM mmol/L 5 0 4 3  --  5 1   CHLORIDE mmol/L 94* 96*  --  93*   CO2 mmol/L 25 27  --  27   BUN mg/dL 58* 52*  --  53*   CREATININE mg/dL 4 78* 4 69*  --  4 73*   EGFR ml/min/1 73sq m 11 12  --  12   CALCIUM mg/dL 9 6 9 2  --  9 8   PHOSPHORUS mg/dL 5 1*  --   --   --        RADIOLOGY RESULTS      Results for orders placed during the hospital encounter of 12/31/18   X-ray chest 1 view portable    Narrative CHEST     INDICATION:   chest pain  Cough    COMPARISON:  None    EXAM PERFORMED/VIEWS:  XR CHEST PORTABLE      FINDINGS:    There is a suboptimal inspiration  This may partly explain the prominent appearance of the heart    Cardiomegaly not excluded  There is an ill-defined right lung infiltrate in the infrahilar region and there appear to be trace pleural effusions  Left lung clear  No pneumothorax identified  Osseous structures appear within normal limits for patient age  Impression Right-sided infiltrates suspicious for developing pneumonia as well as trace pleural effusions  Questionable cardiomegaly       Limited inspiration may exaggerate some of the findings  Suggest continued follow-up preferably with deeper inspiratory effort with PA and lateral views  Workstation performed: [id number]       No results found for this or any previous visit  No results found for this or any previous visit  No results found for this or any previous visit  No results found for this or any previous visit  No results found for this or any previous visit  PLAN / RECOMMENDATIONS      CKD stage 5:  Seems to be 6 chronic  I do not have baseline creatinine  But dialysis has been discussed with him in the past   Suggesting it may be advanced kidney disease  All the symptoms which he has may be suggestive uremia  Initially he had refused dialysis though today he says he is agreeable  Bone and mineral disease:  PTH is high suggesting CKD  Phosphorus is also high    Cardiomyopathy:  Patient does have volume overload status with pleural effusion  Cardiologist in the case and  Diuresing at this point    Anemia:  May be due to CKD: Will treat with Procrit    He discussed the patient  He is agreeable for the dialysis  I will monitor him 1 more day things does not get better then will get dialysis catheter inserted start him on dialysis tomorrow    Leonardo Sears MD  Nephrology  1/2/2019        Portions of the record may have been created with voice recognition software  Occasional wrong word or "sound a like" substitutions may have occurred due to the inherent limitations of voice recognition software   Read the chart carefully and recognize, using context, where substitutions have occurred

## 2019-01-03 ENCOUNTER — APPOINTMENT (INPATIENT)
Dept: NON INVASIVE DIAGNOSTICS | Facility: HOSPITAL | Age: 74
DRG: 673 | End: 2019-01-03
Payer: MEDICARE

## 2019-01-03 PROBLEM — I50.9 ACUTE ON CHRONIC CONGESTIVE HEART FAILURE (HCC): Status: ACTIVE | Noted: 2019-01-03

## 2019-01-03 PROBLEM — I21.4 MI, ACUTE, NON ST SEGMENT ELEVATION (HCC): Status: ACTIVE | Noted: 2019-01-03

## 2019-01-03 LAB
ANION GAP SERPL CALCULATED.3IONS-SCNC: 8 MMOL/L (ref 4–13)
BASOPHILS # BLD AUTO: 0.03 THOUSANDS/ΜL (ref 0–0.1)
BASOPHILS NFR BLD AUTO: 0 % (ref 0–1)
BUN SERPL-MCNC: 60 MG/DL (ref 5–25)
CALCIUM SERPL-MCNC: 9.1 MG/DL (ref 8.3–10.1)
CHLORIDE SERPL-SCNC: 93 MMOL/L (ref 100–108)
CO2 SERPL-SCNC: 30 MMOL/L (ref 21–32)
CREAT SERPL-MCNC: 4.86 MG/DL (ref 0.6–1.3)
EOSINOPHIL # BLD AUTO: 0.05 THOUSAND/ΜL (ref 0–0.61)
EOSINOPHIL NFR BLD AUTO: 1 % (ref 0–6)
ERYTHROCYTE [DISTWIDTH] IN BLOOD BY AUTOMATED COUNT: 16 % (ref 11.6–15.1)
GFR SERPL CREATININE-BSD FRML MDRD: 11 ML/MIN/1.73SQ M
GLUCOSE SERPL-MCNC: 207 MG/DL (ref 65–140)
HCT VFR BLD AUTO: 31.2 % (ref 36.5–49.3)
HGB BLD-MCNC: 10 G/DL (ref 12–17)
IMM GRANULOCYTES # BLD AUTO: 0.02 THOUSAND/UL (ref 0–0.2)
IMM GRANULOCYTES NFR BLD AUTO: 0 % (ref 0–2)
LYMPHOCYTES # BLD AUTO: 1.2 THOUSANDS/ΜL (ref 0.6–4.47)
LYMPHOCYTES NFR BLD AUTO: 16 % (ref 14–44)
MCH RBC QN AUTO: 25.8 PG (ref 26.8–34.3)
MCHC RBC AUTO-ENTMCNC: 32.1 G/DL (ref 31.4–37.4)
MCV RBC AUTO: 80 FL (ref 82–98)
MONOCYTES # BLD AUTO: 0.71 THOUSAND/ΜL (ref 0.17–1.22)
MONOCYTES NFR BLD AUTO: 9 % (ref 4–12)
NEUTROPHILS # BLD AUTO: 5.58 THOUSANDS/ΜL (ref 1.85–7.62)
NEUTS SEG NFR BLD AUTO: 74 % (ref 43–75)
NRBC BLD AUTO-RTO: 0 /100 WBCS
PLATELET # BLD AUTO: 232 THOUSANDS/UL (ref 149–390)
PMV BLD AUTO: 10.1 FL (ref 8.9–12.7)
POTASSIUM SERPL-SCNC: 4.4 MMOL/L (ref 3.5–5.3)
RBC # BLD AUTO: 3.88 MILLION/UL (ref 3.88–5.62)
SODIUM SERPL-SCNC: 131 MMOL/L (ref 136–145)
WBC # BLD AUTO: 7.59 THOUSAND/UL (ref 4.31–10.16)

## 2019-01-03 PROCEDURE — 80048 BASIC METABOLIC PNL TOTAL CA: CPT | Performed by: INTERNAL MEDICINE

## 2019-01-03 PROCEDURE — 93308 TTE F-UP OR LMTD: CPT

## 2019-01-03 PROCEDURE — 99232 SBSQ HOSP IP/OBS MODERATE 35: CPT | Performed by: INTERNAL MEDICINE

## 2019-01-03 PROCEDURE — 93321 DOPPLER ECHO F-UP/LMTD STD: CPT | Performed by: INTERNAL MEDICINE

## 2019-01-03 PROCEDURE — 85025 COMPLETE CBC W/AUTO DIFF WBC: CPT | Performed by: INTERNAL MEDICINE

## 2019-01-03 PROCEDURE — 93308 TTE F-UP OR LMTD: CPT | Performed by: INTERNAL MEDICINE

## 2019-01-03 PROCEDURE — 99233 SBSQ HOSP IP/OBS HIGH 50: CPT | Performed by: INTERNAL MEDICINE

## 2019-01-03 PROCEDURE — 99232 SBSQ HOSP IP/OBS MODERATE 35: CPT | Performed by: FAMILY MEDICINE

## 2019-01-03 PROCEDURE — 93325 DOPPLER ECHO COLOR FLOW MAPG: CPT | Performed by: INTERNAL MEDICINE

## 2019-01-03 RX ORDER — CHOLECALCIFEROL (VITAMIN D3) 10 MCG
1 TABLET ORAL
Status: DISCONTINUED | OUTPATIENT
Start: 2019-01-03 | End: 2019-01-08 | Stop reason: HOSPADM

## 2019-01-03 RX ADMIN — HEPARIN SODIUM 5000 UNITS: 5000 INJECTION, SOLUTION INTRAVENOUS; SUBCUTANEOUS at 21:49

## 2019-01-03 RX ADMIN — HEPARIN SODIUM 5000 UNITS: 5000 INJECTION, SOLUTION INTRAVENOUS; SUBCUTANEOUS at 13:20

## 2019-01-03 RX ADMIN — BENZONATATE 100 MG: 100 CAPSULE ORAL at 21:49

## 2019-01-03 RX ADMIN — FUROSEMIDE 40 MG: 10 INJECTION, SOLUTION INTRAMUSCULAR; INTRAVENOUS at 16:41

## 2019-01-03 RX ADMIN — FUROSEMIDE 40 MG: 10 INJECTION, SOLUTION INTRAMUSCULAR; INTRAVENOUS at 08:46

## 2019-01-03 RX ADMIN — PANTOPRAZOLE SODIUM 40 MG: 40 TABLET, DELAYED RELEASE ORAL at 06:00

## 2019-01-03 RX ADMIN — ASPIRIN 81 MG 81 MG: 81 TABLET ORAL at 08:46

## 2019-01-03 RX ADMIN — HEPARIN SODIUM 5000 UNITS: 5000 INJECTION, SOLUTION INTRAVENOUS; SUBCUTANEOUS at 06:00

## 2019-01-03 RX ADMIN — ATORVASTATIN CALCIUM 10 MG: 10 TABLET, FILM COATED ORAL at 16:41

## 2019-01-03 RX ADMIN — Medication 1 CAPSULE: at 16:41

## 2019-01-03 RX ADMIN — BENZONATATE 100 MG: 100 CAPSULE ORAL at 16:41

## 2019-01-03 RX ADMIN — CALCIUM ACETATE 667 MG: 667 CAPSULE ORAL at 13:20

## 2019-01-03 RX ADMIN — BENZONATATE 100 MG: 100 CAPSULE ORAL at 08:46

## 2019-01-03 RX ADMIN — CALCIUM ACETATE 667 MG: 667 CAPSULE ORAL at 16:41

## 2019-01-03 NOTE — PLAN OF CARE
Problem: Nutrition/Hydration-ADULT  Goal: Nutrient/Hydration intake appropriate for improving, restoring or maintaining nutritional needs  Monitor and assess patient's nutrition/hydration status for malnutrition (ex- brittle hair, bruises, dry skin, pale skin and conjunctiva, muscle wasting, smooth red tongue, and disorientation)  Collaborate with interdisciplinary team and initiate plan and interventions as ordered  Monitor patient's weight and dietary intake as ordered or per policy  Utilize nutrition screening tool and intervene per policy  Determine patient's food preferences and provide high-protein, high-caloric foods as appropriate  INTERVENTIONS:  - Monitor oral intake, urinary output, labs, and treatment plans  - Assess nutrition and hydration status and recommend course of action  - Evaluate amount of meals eaten  - Assist patient with eating if necessary   - Allow adequate time for meals  - Recommend/ encourage appropriate diets, oral nutritional supplements, and vitamin/mineral supplements  - Order, calculate, and assess calorie counts as needed  - Recommend, monitor, and adjust tube feedings and TPN/PPN based on assessed needs  - Assess need for intravenous fluids  - Provide nutrition/hydration education as appropriate  - Include patient/family/caregiver in decisions related to nutrition    Outcome: Progressing  Recommend Glucerna TID with meals and SLP evaluation

## 2019-01-03 NOTE — PROGRESS NOTES
General Cardiology   Progress Note   Lidia Dunlap 72 y o  male MRN: [id number]  Unit/Bed#: -01 Encounter: [id number]        Subjective:    No significant events since the last encounter  Dyspnea is improved significantly since yesterday  Patient looks better overall  Objective:   Vitals:  Vitals:    01/03/19 0730   BP: 168/77   Pulse: 90   Resp: 20   Temp: 98 3 °F (36 8 °C)   SpO2: 97%       Body mass index is 27 88 kg/m²  Systolic (95QGB), UHB:666 , Min:115 , WMJ:768     Diastolic (21YTA), QMW:25, Min:61, Max:82      Intake/Output Summary (Last 24 hours) at 01/03/19 1038  Last data filed at 01/03/19 0300   Gross per 24 hour   Intake                0 ml   Output              350 ml   Net             -350 ml     Weight (last 2 days)     Date/Time   Weight    01/03/19 0600  76 (167 55)    01/02/19 0547  76 6 (168 87)    01/02/19 0546  76 6 (168 87)    01/01/19 0559  70 6 (155 65)              Telemetry Review: No significant arrhythmias seen on telemetry review  PHYSICAL EXAMS:  General:  Patient is not in acute distress, laying in the bed comfortably, awake, alert responding to commands  Head: Normocephalic, Atraumatic  HEENT: White sclera, pink conjunctiva,  PERRLA,pharynx benign  Neck:  Supple, no neck vein distention, carotids+2/+2 no bruits, thyromegaly, adenopathy  Respiratory: clear to P/A  Cardiovascular:  PMI normal, S1-S2 normal, No  Murmurs, thrills, gallops, rubs   Regular rhythm  GI:  Abdomen soft nontender   No hepatosplenomegaly, adenopathy, ascites,or rebound tenderness  Extremities: No edema, normal pulses, no calf tenderness, no joint deformities, no venous disease   Integument:  No skin rashes or ulceration  Lymphatic:  No cervical or inguinal lymphadenopathy  Neurologic:  Patient is awake alert, responding to command, well-oriented to time and place and person moving all extremities      LABORATORY RESULTS:    Results from last 7 days  Lab Units 01/02/19  1426 01/02/19  1128 01/02/19 0432 01/01/19  0511   CK TOTAL U/L  --   --  85  --    TROPONIN I ng/mL 0 19* 0 17*  --  0 06*     CBC with diff:   Results from last 7 days  Lab Units 01/03/19 0437 01/02/19 0432 01/01/19  0511   WBC Thousand/uL 7 59 10 18* 7 36   HEMOGLOBIN g/dL 10 0* 10 7* 9 3*   HEMATOCRIT % 31 2* 33 2* 29 5*   MCV fL 80* 81* 82   PLATELETS Thousands/uL 232 244 205   MCH pg 25 8* 26 0* 26 0*   MCHC g/dL 32 1 32 2 31 5   RDW % 16 0* 16 3* 16 2*   MPV fL 10 1 10 8 10 9   NRBC AUTO /100 WBCs 0 0 0       CMP:  Results from last 7 days  Lab Units 01/03/19 0437 01/02/19 0432 01/01/19  0511 12/31/18  1259   POTASSIUM mmol/L 4 4 5 0 4 3 5 1   CHLORIDE mmol/L 93* 94* 96* 93*   CO2 mmol/L 30 25 27 27   BUN mg/dL 60* 58* 52* 53*   CREATININE mg/dL 4 86* 4 78* 4 69* 4 73*   CALCIUM mg/dL 9 1 9 6 9 2 9 8   AST U/L  --   --  16 14   ALT U/L  --   --  17 23   ALK PHOS U/L  --   --  73 90   EGFR ml/min/1 73sq m 11 11 12 12       BMP:  Results from last 7 days  Lab Units 01/03/19 0437 01/02/19 0432 01/01/19  0511   POTASSIUM mmol/L 4 4 5 0 4 3   CHLORIDE mmol/L 93* 94* 96*   CO2 mmol/L 30 25 27   BUN mg/dL 60* 58* 52*   CREATININE mg/dL 4 86* 4 78* 4 69*   CALCIUM mg/dL 9 1 9 6 9 2         Results from last 7 days  Lab Units 01/02/19 0432 12/31/18  1259   NT-PRO BNP pg/mL 23,856* 22,385*            Results from last 7 days  Lab Units 01/02/19  0432   HEMOGLOBIN A1C % 8 2*           Results from last 7 days  Lab Units 12/31/18  1506   INR  1 22*       Lipid Profile:   No results found for: CHOL  No results found for: HDL  No results found for: LDLCALC  No results found for: TRIG    Cardiac testing:   No results found for this or any previous visit  No results found for this or any previous visit  No results found for this or any previous visit  No procedure found  No results found for this or any previous visit      Meds/Allergies   all current active meds have been reviewed  Prescriptions Prior to Admission   Medication    aspirin 81 mg chewable tablet    atorvastatin (LIPITOR) 10 mg tablet    benzonatate (TESSALON PERLES) 100 mg capsule    METOPROLOL SUCCINATE PO    pantoprazole (PROTONIX) 40 mg tablet    sodium bicarbonate 325 MG tablet    torsemide (DEMADEX) 20 mg tablet            Assessment/Plan:  1  Acute on chronic CHF exacerbation:  Unknown type, systolic versus diastolic  Patient is responding well to diuresis, volume status improved significantly  Continue Lasix  Limited echocardiogram to evaluate LVEF, pending  Watch serum creatinine and electrolytes     2  Chest pain/NSTEMI,   Positive troponin probably due to acute kidney injury and demand ischemia from CHF  Limited echo to evaluate for any regional wall motion abnormalities  If the patient goes on dialysis, he might need cardiac catheterization to rule out significant CAD patient is at high risk for CAD given history of uncontrolled diabetes  If patient does not want dialysis, will consider doing nuclear stress test to evaluate ischemic burden  Counseling / Coordination of Care  Total floor / unit time spent today 25 minutes  Greater than 50% of total time was spent with the patient and / or family counseling and / or coordination of care  ** Please Note: Dragon 360 Dictation voice to text software may have been used in the creation of this document   **

## 2019-01-03 NOTE — PROGRESS NOTES
[facility] Progress Note  Patient: Misty Neville 72 y o  male   MRN: [id number]  PCP: No primary care provider on file  Unit/Bed#: -01 Encounter: [id number]  Date Of Visit: 01/03/19    Assessment:    Principal Problem:    Shortness of breath  Active Problems:    Chest pain    NATALIIA (acute kidney injury) ([address] )    CKD (chronic kidney disease)    Cardiomyopathy (HCC)    Hyponatremia    MI, acute, non ST segment elevation (HCC)    Acute on chronic congestive heart failure (HCC)      Plan:    Short of breath:  Some improvement  likely secondary to acute decompensation of cardiomyopathy with low ejection fraction  chest x-ray with  Right-sided infiltrates suspicious for developing pneumonia as well as trace pleural effusions    -Continue Observing off antibiotic no fever, No elevation of WBC, patient looks acutely ill consider to start antibiotic after procalcitonin  -Normal Procalcitonin  -input and output  -Continue IV Lasix  -continue telemetric  -F/U cardiology and further Nephrology recommendation  -continue 2 lt NC  Acute on chronic congestive heart failure:  Continue Lasix  Continue improved output  Follow further Cardiology recommendation    NSTMI II:  Setting of NATALIIA and CHF      -Please see chart from another Hospital for echo report and endoscopy, as well as previous discharge summary  Acutely ill appearance  Guarded prognosis     · NATALIIA/CKDIV: elevated today compared as yesterday  Patient was discharged on torsemide   still overload   Per record patient refused to be on hemodialysis which at this point he could be conceded   Nephrology recommendation  · Chest pain: improved   most likely secondary to acute decompensation of cardiomyopathy   Follow further Cardiology recommendation  · CAD:  Status post nonSTMI per clinical our record in the chart  Beni Ga was no candidate for cardiac catheterization as per record   Continue secondary prevention and follow-up cardiology recommendation  GERD with spha: continue PPI      VTE Pharmacologic Prophylaxis:   Pharmacologic: Heparin  Mechanical VTE Prophylaxis in Place: Yes    Patient Centered Rounds: I have performed bedside rounds with nursing staff today  Discussions with Specialists or Other Care Team Provider: Cardiology/ Nephrology    Education and Discussions with Family / Patient: patient    Time Spent for Care: 20 minutes  More than 50% of total time spent on counseling and coordination of care as described above  Current Length of Stay: 3 day(s)    Current Patient Status: Inpatient   Certification Statement: The patient will continue to require additional inpatient hospital stay due to Acute above condition    Discharge Plan / Estimated Discharge Date:  No stable to be discharged  Code Status: Level 1 - Full Code      Subjective:   Patient states he is feeling better  Generalized weak weakness has improved  He denied worsening short of breath  Objective:     Vitals:   Temp (24hrs), Av °F (36 7 °C), Min:97 4 °F (36 3 °C), Max:98 3 °F (36 8 °C)    Temp:  [97 4 °F (36 3 °C)-98 3 °F (36 8 °C)] 98 3 °F (36 8 °C)  HR:  [90-98] 90  Resp:  [16-20] 20  BP: (115-174)/(61-82) 168/77  SpO2:  [97 %-100 %] 97 %  Body mass index is 27 88 kg/m²  Input and Output Summary (last 24 hours): Intake/Output Summary (Last 24 hours) at 19 1135  Last data filed at 19 0300   Gross per 24 hour   Intake                0 ml   Output              350 ml   Net             -350 ml       Physical Exam:     Physical Exam   Constitutional: He is oriented to person, place, and time  He appears ill  No distress  On NC   Cardiovascular: Exam reveals no gallop and no friction rub  No murmur heard  Pulmonary/Chest: No respiratory distress  He has no wheezes  He has rales  He exhibits no tenderness  Abdominal: He exhibits no distension and no mass  There is no tenderness  There is no rebound and no guarding     Neurological: He is alert and oriented to person, place, and time  Skin: He is not diaphoretic  There is pallor  Additional Data:     Labs:      Results from last 7 days  Lab Units 01/03/19  0437   WBC Thousand/uL 7 59   HEMOGLOBIN g/dL 10 0*   HEMATOCRIT % 31 2*   PLATELETS Thousands/uL 232   NEUTROS PCT % 74   LYMPHS PCT % 16   MONOS PCT % 9   EOS PCT % 1       Results from last 7 days  Lab Units 01/03/19  0437  01/01/19  0511   POTASSIUM mmol/L 4 4  < > 4 3   CHLORIDE mmol/L 93*  < > 96*   CO2 mmol/L 30  < > 27   BUN mg/dL 60*  < > 52*   CREATININE mg/dL 4 86*  < > 4 69*   CALCIUM mg/dL 9 1  < > 9 2   ALK PHOS U/L  --   --  73   ALT U/L  --   --  17   AST U/L  --   --  16   < > = values in this interval not displayed  Results from last 7 days  Lab Units 12/31/18  1506   INR  1 22*       * I Have Reviewed All Lab Data Listed Above  * Additional Pertinent Lab Tests Reviewed: All Labs Within Last 24 Hours Reviewed    Imaging:    Imaging Reports Reviewed Today Include:   Imaging Personally Reviewed by Myself Includes:      Recent Cultures (last 7 days):           Last 24 Hours Medication List:     Current Facility-Administered Medications:  aspirin 81 mg Oral Daily Cathleen Oliveira MD   atorvastatin 10 mg Oral Daily With Parrish Wood MD   benzonatate 100 mg Oral TID Cathleen Oliveira MD   furosemide 40 mg Intravenous BID (diuretic) Cathleen Oliveira MD   heparin (porcine) 5,000 Units Subcutaneous Q8H [address] Cathleen Oliveira MD   ondansetron 4 mg Intravenous Q6H PRN Cathleen Oliveira MD   pantoprazole 40 mg Oral Early Morning Cathleen Oliveira MD        Today, Patient Was Seen By: Cathleen Oliveira MD    ** Please Note: This note has been constructed using a voice recognition system   **

## 2019-01-03 NOTE — PROGRESS NOTES
NEPHROLOGY PROGRESS NOTE    Patient: Mariah Solis               Sex: male          DOA: 12/31/2018 12:47 PM   YOB: 1945        Age:  72 y o         LOS:  LOS: 3 days       HPI     Patient with advanced kidney disease possible 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 admitted to hospital shortness of breath    SUBJECTIVE     He is feeling better  Denies shortness of breath    Nausea is better but still has anorexia and still feeling overall weak    He does have history of cardiomyopathy and coronary artery disease in the past    CURRENT MEDICATIONS       Current Facility-Administered Medications:     aspirin chewable tablet 81 mg, 81 mg, Oral, Daily, Carmencita Ramirez MD, 81 mg at 01/03/19 0846    atorvastatin (LIPITOR) tablet 10 mg, 10 mg, Oral, Daily With Leopold Penman, MD, 10 mg at 01/02/19 1537    benzonatate (TESSALON PERLES) capsule 100 mg, 100 mg, Oral, TID, Carmencita Ramirez MD, 100 mg at 01/03/19 0846    furosemide (LASIX) injection 40 mg, 40 mg, Intravenous, BID (diuretic), Carmencita Ramirez MD, 40 mg at 01/03/19 0846    heparin (porcine) subcutaneous injection 5,000 Units, 5,000 Units, Subcutaneous, Q8H [address], 5,000 Units at 01/03/19 0600 **AND** Platelet count, , , Once, Carmencita Ramirez MD    ondansetron (ZOFRAN) injection 4 mg, 4 mg, Intravenous, Q6H PRN, Carmencita Ramirez MD, 4 mg at 01/02/19 1125    pantoprazole (PROTONIX) EC tablet 40 mg, 40 mg, Oral, Early Morning, Carmencita Ramirez MD, 40 mg at 01/03/19 0600    OBJECTIVE     Current Weight: Weight - Scale: 76 kg (167 lb 8 8 oz)  Vitals:    01/03/19 0730   BP: 168/77   Pulse: 90   Resp: 20   Temp: 98 3 °F (36 8 °C)   SpO2: 97%       Intake/Output Summary (Last 24 hours) at 01/03/19 1147  Last data filed at 01/03/19 0300   Gross per 24 hour   Intake                0 ml   Output              350 ml   Net             -350 ml       PHYSICAL EXAMINATION     Physical Exam   Constitutional: He is oriented to person, place, and time  He appears well-developed  No distress  HENT:   Head: Normocephalic  Mouth/Throat: Oropharynx is clear and moist    Eyes: Conjunctivae and EOM are normal  No scleral icterus  Neck: Normal range of motion  Neck supple  No JVD present  Cardiovascular: Normal rate and normal heart sounds  Pulmonary/Chest: Effort normal and breath sounds normal  No respiratory distress  He has no wheezes  Abdominal: Soft  There is no tenderness  Musculoskeletal: Normal range of motion  He exhibits no edema  Neurological: He is alert and oriented to person, place, and time  Skin: Skin is warm  No rash noted  Psychiatric: He has a normal mood and affect  His behavior is normal         LAB RESULTS       Results from last 7 days  Lab Units 01/03/19  0437 01/02/19  0432 01/01/19  0511 12/31/18 2127 12/31/18  1259   WBC Thousand/uL 7 59 10 18* 7 36  --  9 46   HEMOGLOBIN g/dL 10 0* 10 7* 9 3*  --  11 2*   HEMATOCRIT % 31 2* 33 2* 29 5*  --  34 5*   PLATELETS Thousands/uL 232 244 205 236 257   POTASSIUM mmol/L 4 4 5 0 4 3  --  5 1   CHLORIDE mmol/L 93* 94* 96*  --  93*   CO2 mmol/L 30 25 27  --  27   BUN mg/dL 60* 58* 52*  --  53*   CREATININE mg/dL 4 86* 4 78* 4 69*  --  4 73*   EGFR ml/min/1 73sq m 11 11 12  --  12   CALCIUM mg/dL 9 1 9 6 9 2  --  9 8   PHOSPHORUS mg/dL  --  5 1*  --   --   --        RADIOLOGY RESULTS      Results for orders placed during the hospital encounter of 12/31/18   X-ray chest 1 view portable    Narrative CHEST     INDICATION:   chest pain  Cough    COMPARISON:  None    EXAM PERFORMED/VIEWS:  XR CHEST PORTABLE      FINDINGS:    There is a suboptimal inspiration  This may partly explain the prominent appearance of the heart  Cardiomegaly not excluded  There is an ill-defined right lung infiltrate in the infrahilar region and there appear to be trace pleural effusions  Left lung clear  No pneumothorax identified      Osseous structures appear within normal limits for patient age  Impression Right-sided infiltrates suspicious for developing pneumonia as well as trace pleural effusions  Questionable cardiomegaly       Limited inspiration may exaggerate some of the findings  Suggest continued follow-up preferably with deeper inspiratory effort with PA and lateral views  Workstation performed: [id number]       No results found for this or any previous visit  No results found for this or any previous visit  No results found for this or any previous visit  No results found for this or any previous visit  No results found for this or any previous visit  PLAN / RECOMMENDATIONS      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:  Dialysis discussed with him again today as I think is mildly uremic no also for the volume condition  He is not sure about dialysis today again  He had multiple question which I answered  He still not 100% sure and wants to think about it  He also wants to discuss with his son whom I call  He is on his way here salt she done with him also    Coronary artery disease:  Discussed with cardiologist they prefer him to have angiogram with present symptomatology  Again dialysis is issue  If he is willing to go for dialysis then day will catheterization him    Cardiomyopathy: At present asymptomatic will continue present management    Discussed at length with slim as well as cardiologist Will also discussed with patient family    Joann Hill MD  Nephrology  1/3/2019        Portions of the record may have been created with voice recognition software  Occasional wrong word or "sound a like" substitutions may have occurred due to the inherent limitations of voice recognition software  Read the chart carefully and recognize, using context, where substitutions have occurred

## 2019-01-04 ENCOUNTER — APPOINTMENT (INPATIENT)
Dept: DIALYSIS | Facility: HOSPITAL | Age: 74
DRG: 673 | End: 2019-01-04
Attending: INTERNAL MEDICINE
Payer: MEDICARE

## 2019-01-04 ENCOUNTER — APPOINTMENT (INPATIENT)
Dept: INTERVENTIONAL RADIOLOGY/VASCULAR | Facility: HOSPITAL | Age: 74
DRG: 673 | End: 2019-01-04
Payer: MEDICARE

## 2019-01-04 ENCOUNTER — APPOINTMENT (INPATIENT)
Dept: INTERVENTIONAL RADIOLOGY/VASCULAR | Facility: HOSPITAL | Age: 74
DRG: 673 | End: 2019-01-04
Attending: INTERNAL MEDICINE
Payer: MEDICARE

## 2019-01-04 LAB
ANION GAP SERPL CALCULATED.3IONS-SCNC: 7 MMOL/L (ref 4–13)
BASOPHILS # BLD AUTO: 0.04 THOUSANDS/ΜL (ref 0–0.1)
BASOPHILS NFR BLD AUTO: 1 % (ref 0–1)
BUN SERPL-MCNC: 60 MG/DL (ref 5–25)
CALCIUM SERPL-MCNC: 9.5 MG/DL (ref 8.3–10.1)
CHLORIDE SERPL-SCNC: 92 MMOL/L (ref 100–108)
CO2 SERPL-SCNC: 32 MMOL/L (ref 21–32)
CREAT SERPL-MCNC: 5.06 MG/DL (ref 0.6–1.3)
EOSINOPHIL # BLD AUTO: 0.08 THOUSAND/ΜL (ref 0–0.61)
EOSINOPHIL NFR BLD AUTO: 1 % (ref 0–6)
ERYTHROCYTE [DISTWIDTH] IN BLOOD BY AUTOMATED COUNT: 15.9 % (ref 11.6–15.1)
GFR SERPL CREATININE-BSD FRML MDRD: 11 ML/MIN/1.73SQ M
GLUCOSE SERPL-MCNC: 254 MG/DL (ref 65–140)
HCT VFR BLD AUTO: 30.7 % (ref 36.5–49.3)
HGB BLD-MCNC: 10 G/DL (ref 12–17)
IMM GRANULOCYTES # BLD AUTO: 0.03 THOUSAND/UL (ref 0–0.2)
IMM GRANULOCYTES NFR BLD AUTO: 0 % (ref 0–2)
LYMPHOCYTES # BLD AUTO: 1.25 THOUSANDS/ΜL (ref 0.6–4.47)
LYMPHOCYTES NFR BLD AUTO: 14 % (ref 14–44)
MCH RBC QN AUTO: 26 PG (ref 26.8–34.3)
MCHC RBC AUTO-ENTMCNC: 32.6 G/DL (ref 31.4–37.4)
MCV RBC AUTO: 80 FL (ref 82–98)
MONOCYTES # BLD AUTO: 0.78 THOUSAND/ΜL (ref 0.17–1.22)
MONOCYTES NFR BLD AUTO: 9 % (ref 4–12)
NEUTROPHILS # BLD AUTO: 6.52 THOUSANDS/ΜL (ref 1.85–7.62)
NEUTS SEG NFR BLD AUTO: 75 % (ref 43–75)
NRBC BLD AUTO-RTO: 0 /100 WBCS
PLATELET # BLD AUTO: 214 THOUSANDS/UL (ref 149–390)
PMV BLD AUTO: 10.2 FL (ref 8.9–12.7)
POTASSIUM SERPL-SCNC: 4.6 MMOL/L (ref 3.5–5.3)
RBC # BLD AUTO: 3.84 MILLION/UL (ref 3.88–5.62)
SODIUM SERPL-SCNC: 131 MMOL/L (ref 136–145)
WBC # BLD AUTO: 8.7 THOUSAND/UL (ref 4.31–10.16)

## 2019-01-04 PROCEDURE — 76937 US GUIDE VASCULAR ACCESS: CPT

## 2019-01-04 PROCEDURE — 87340 HEPATITIS B SURFACE AG IA: CPT | Performed by: INTERNAL MEDICINE

## 2019-01-04 PROCEDURE — 77001 FLUOROGUIDE FOR VEIN DEVICE: CPT

## 2019-01-04 PROCEDURE — 76937 US GUIDE VASCULAR ACCESS: CPT | Performed by: RADIOLOGY

## 2019-01-04 PROCEDURE — 02H633Z INSERTION OF INFUSION DEVICE INTO RIGHT ATRIUM, PERCUTANEOUS APPROACH: ICD-10-PCS | Performed by: RADIOLOGY

## 2019-01-04 PROCEDURE — 99232 SBSQ HOSP IP/OBS MODERATE 35: CPT | Performed by: INTERNAL MEDICINE

## 2019-01-04 PROCEDURE — 99152 MOD SED SAME PHYS/QHP 5/>YRS: CPT

## 2019-01-04 PROCEDURE — 36558 INSERT TUNNELED CV CATH: CPT

## 2019-01-04 PROCEDURE — 5A1D70Z PERFORMANCE OF URINARY FILTRATION, INTERMITTENT, LESS THAN 6 HOURS PER DAY: ICD-10-PCS | Performed by: INTERNAL MEDICINE

## 2019-01-04 PROCEDURE — 36558 INSERT TUNNELED CV CATH: CPT | Performed by: RADIOLOGY

## 2019-01-04 PROCEDURE — 77001 FLUOROGUIDE FOR VEIN DEVICE: CPT | Performed by: RADIOLOGY

## 2019-01-04 PROCEDURE — 86704 HEP B CORE ANTIBODY TOTAL: CPT | Performed by: INTERNAL MEDICINE

## 2019-01-04 PROCEDURE — 86705 HEP B CORE ANTIBODY IGM: CPT | Performed by: INTERNAL MEDICINE

## 2019-01-04 PROCEDURE — 85025 COMPLETE CBC W/AUTO DIFF WBC: CPT | Performed by: INTERNAL MEDICINE

## 2019-01-04 PROCEDURE — 80048 BASIC METABOLIC PNL TOTAL CA: CPT | Performed by: INTERNAL MEDICINE

## 2019-01-04 PROCEDURE — 99232 SBSQ HOSP IP/OBS MODERATE 35: CPT | Performed by: GENERAL PRACTICE

## 2019-01-04 PROCEDURE — 0JH63XZ INSERTION OF TUNNELED VASCULAR ACCESS DEVICE INTO CHEST SUBCUTANEOUS TISSUE AND FASCIA, PERCUTANEOUS APPROACH: ICD-10-PCS | Performed by: RADIOLOGY

## 2019-01-04 PROCEDURE — 99152 MOD SED SAME PHYS/QHP 5/>YRS: CPT | Performed by: RADIOLOGY

## 2019-01-04 PROCEDURE — C1750 CATH, HEMODIALYSIS,LONG-TERM: HCPCS

## 2019-01-04 PROCEDURE — 99153 MOD SED SAME PHYS/QHP EA: CPT

## 2019-01-04 PROCEDURE — 86803 HEPATITIS C AB TEST: CPT | Performed by: INTERNAL MEDICINE

## 2019-01-04 PROCEDURE — 86706 HEP B SURFACE ANTIBODY: CPT | Performed by: INTERNAL MEDICINE

## 2019-01-04 RX ORDER — FENTANYL CITRATE 50 UG/ML
INJECTION, SOLUTION INTRAMUSCULAR; INTRAVENOUS CODE/TRAUMA/SEDATION MEDICATION
Status: COMPLETED | OUTPATIENT
Start: 2019-01-04 | End: 2019-01-04

## 2019-01-04 RX ORDER — HEPARIN SODIUM 1000 [USP'U]/ML
INJECTION, SOLUTION INTRAVENOUS; SUBCUTANEOUS CODE/TRAUMA/SEDATION MEDICATION
Status: COMPLETED | OUTPATIENT
Start: 2019-01-04 | End: 2019-01-04

## 2019-01-04 RX ORDER — LIDOCAINE HYDROCHLORIDE AND EPINEPHRINE 10; 10 MG/ML; UG/ML
INJECTION, SOLUTION INFILTRATION; PERINEURAL CODE/TRAUMA/SEDATION MEDICATION
Status: COMPLETED | OUTPATIENT
Start: 2019-01-04 | End: 2019-01-04

## 2019-01-04 RX ORDER — MIDAZOLAM HYDROCHLORIDE 1 MG/ML
INJECTION INTRAMUSCULAR; INTRAVENOUS CODE/TRAUMA/SEDATION MEDICATION
Status: COMPLETED | OUTPATIENT
Start: 2019-01-04 | End: 2019-01-04

## 2019-01-04 RX ADMIN — FENTANYL CITRATE 50 MCG: 50 INJECTION, SOLUTION INTRAMUSCULAR; INTRAVENOUS at 07:16

## 2019-01-04 RX ADMIN — EPOETIN ALFA 3000 UNITS: 3000 SOLUTION INTRAVENOUS; SUBCUTANEOUS at 15:14

## 2019-01-04 RX ADMIN — BENZONATATE 100 MG: 100 CAPSULE ORAL at 08:48

## 2019-01-04 RX ADMIN — LIDOCAINE HYDROCHLORIDE,EPINEPHRINE BITARTRATE 5 ML: 10; .01 INJECTION, SOLUTION INFILTRATION; PERINEURAL at 07:38

## 2019-01-04 RX ADMIN — HEPARIN SODIUM 5000 UNITS: 5000 INJECTION, SOLUTION INTRAVENOUS; SUBCUTANEOUS at 14:22

## 2019-01-04 RX ADMIN — BENZONATATE 100 MG: 100 CAPSULE ORAL at 18:27

## 2019-01-04 RX ADMIN — HEPARIN SODIUM 2000 UNITS: 1000 INJECTION INTRAVENOUS; SUBCUTANEOUS at 07:49

## 2019-01-04 RX ADMIN — Medication 1 CAPSULE: at 18:27

## 2019-01-04 RX ADMIN — MIDAZOLAM HYDROCHLORIDE 0.5 MG: 1 INJECTION, SOLUTION INTRAMUSCULAR; INTRAVENOUS at 07:32

## 2019-01-04 RX ADMIN — FENTANYL CITRATE 25 MCG: 50 INJECTION, SOLUTION INTRAMUSCULAR; INTRAVENOUS at 07:43

## 2019-01-04 RX ADMIN — ASPIRIN 81 MG 81 MG: 81 TABLET ORAL at 08:48

## 2019-01-04 RX ADMIN — ONDANSETRON 4 MG: 2 INJECTION INTRAMUSCULAR; INTRAVENOUS at 23:19

## 2019-01-04 RX ADMIN — MIDAZOLAM HYDROCHLORIDE 1 MG: 1 INJECTION, SOLUTION INTRAMUSCULAR; INTRAVENOUS at 07:18

## 2019-01-04 RX ADMIN — BENZONATATE 100 MG: 100 CAPSULE ORAL at 21:31

## 2019-01-04 RX ADMIN — FUROSEMIDE 40 MG: 10 INJECTION, SOLUTION INTRAMUSCULAR; INTRAVENOUS at 08:48

## 2019-01-04 RX ADMIN — HEPARIN SODIUM 2000 UNITS: 1000 INJECTION INTRAVENOUS; SUBCUTANEOUS at 07:48

## 2019-01-04 RX ADMIN — HEPARIN SODIUM 5000 UNITS: 5000 INJECTION, SOLUTION INTRAVENOUS; SUBCUTANEOUS at 21:29

## 2019-01-04 RX ADMIN — CALCIUM ACETATE 667 MG: 667 CAPSULE ORAL at 18:26

## 2019-01-04 RX ADMIN — ATORVASTATIN CALCIUM 10 MG: 10 TABLET, FILM COATED ORAL at 18:26

## 2019-01-04 RX ADMIN — PANTOPRAZOLE SODIUM 40 MG: 40 TABLET, DELAYED RELEASE ORAL at 06:06

## 2019-01-04 RX ADMIN — LIDOCAINE HYDROCHLORIDE,EPINEPHRINE BITARTRATE 5 ML: 10; .01 INJECTION, SOLUTION INFILTRATION; PERINEURAL at 07:41

## 2019-01-04 RX ADMIN — FUROSEMIDE 40 MG: 10 INJECTION, SOLUTION INTRAMUSCULAR; INTRAVENOUS at 18:28

## 2019-01-04 RX ADMIN — EPOETIN ALFA 2000 UNITS: 2000 SOLUTION INTRAVENOUS; SUBCUTANEOUS at 15:14

## 2019-01-04 NOTE — ASSESSMENT & PLAN NOTE
Cardiology following  with CHF and ckd  For HD  Continue current meds  For cardiac cath then HD today

## 2019-01-04 NOTE — PLAN OF CARE
Problem: DISCHARGE PLANNING - CARE MANAGEMENT  Goal: Discharge to post-acute care or home with appropriate resources  INTERVENTIONS:  - Conduct assessment to determine patient/family and health care team treatment goals, and need for post-acute services based on payer coverage, community resources, and patient preferences, and barriers to discharge  - Address psychosocial, clinical, and financial barriers to discharge as identified in assessment in conjunction with the patient/family and health care team  - Arrange appropriate level of post-acute services according to patients   needs and preference and payer coverage in collaboration with the physician and health care team  - Communicate with and update the patient/family, physician, and health care team regarding progress on the discharge plan  - Arrange appropriate transportation to post-acute venues  Outcome: Progressing  CM and pt discussed new start dialysis  Pt interested in MWF at 10am  His son will transport until Shared Ride application is finalized

## 2019-01-04 NOTE — UTILIZATION REVIEW
Continued Stay Review    Date: 1/4/19   INPATIENT     Age/Sex: 72 y o  male initially admitted 12/31/18 due to acute decompensation of cardiomyopathy with low ejection fraction vs bronchopneumonia process, chest x-ray with  Right-sided infiltrates suspicious for developing pneumonia as well as trace pleural effusions  Assessment/Plan: creat worsened on 1/3, acute on chronic  Renal injury  Pt previously refused HD and now agrees  awaiting cardiac cath to r/o CAD due to high risk for CAD  Needs to be determined prior to dialysis start  Acute on chronic congestive heart failure (HCC)   Assessment & Plan     With CKD-due to start HD today  Cardiology following      MI, acute, non ST segment elevation [facility])   Assessment & Plan     Cardiology following  with CHF and ckd  For HD  Continue current meds  For cardiac cath then HD today      NATALIIA (acute kidney injury) ([facility] [address] )   Assessment & Plan     With CKD for HD       Vital Signs: /69 (BP Location: Left arm)   Pulse 93   Temp 98 5 °F (36 9 °C) (Oral)   Resp 20   Ht 5' 5" (1 651 m)   Wt 76 kg (167 lb 8 8 oz)   SpO2 99%   BMI 27 88 kg/m²     Medications:   Scheduled Meds:   Current Facility-Administered Medications:  aspirin 81 mg Oral Daily   atorvastatin 10 mg Oral Daily With Dinner   b complex-vitamin C-folic acid 1 capsule Oral Daily With Dinner   benzonatate 100 mg Oral TID   calcium acetate 667 mg Oral TID With Meals   epoetin deepa 2,000 Units Intravenous Once per day on Mon Wed Fri   And      epoetin deepa 3,000 Units Intravenous Once per day on Mon Wed Fri   furosemide 40 mg Intravenous BID (diuretic)   heparin (porcine) 5,000 Units Subcutaneous Q8H [facility]   ondansetron 4 mg Intravenous Q6H PRN   pantoprazole 40 mg Oral Early Morning     Abnormal Labs/Diagnostic Results:   1/2 UA: sm bld   100 prot   1-2 rbc   0-1 wbc   6234 microalb/creat ratio    Random microalb 1490  occ epithelials  a1c 8 2   Parathyroid hormone 180  2   Wbc 10 18  h gb 10 7   hct 33 2  D 25 28 5   Iron 42   probnp 29883    Trop  17,  19     Cardiac cath pending  Echo: EF 35% severe diffuse hypokinesis,   RIGHT VENTRICLE:The ventricle was mildly dilated    MITRAL VALVE:There was moderate regurgitation    AORTIC VALVE:The valve was trileaflet  Leaflets exhibited mild calcification and normal cuspal separation    TRICUSPID VALVE:There was mild regurgitation  Estimated peak PA pressure was 60 mmHg      1/3: bun 60   Creat 4 86   Gluc 207  Na 131    1/4: na 131   Cl 92   Bun 60   Creat 5 06   Gluc 254   gfr 11  hgb 10   hct 30 7       Discharge Plan: TBD

## 2019-01-04 NOTE — PROGRESS NOTES
Progress Note Jeremias List 1945, 72 y o  male MRN: [id number]    Unit/Bed#: -01 Encounter: [id number]    Primary Care Provider: No primary care provider on file  Date and time admitted to hospital: 2018 12:47 PM        Acute on chronic congestive heart failure ([address] )   Assessment & Plan    With CKD-due to start HD today  Cardiology following     MI, acute, non ST segment elevation [facility])   Assessment & Plan    Cardiology following  with CHF and ckd  For HD  Continue current meds  For cardiac cath then HD today     NATALIIA (acute kidney injury) [facility])   Assessment & Plan    With CKD for HD         VTE Pharmacologic Prophylaxis:   Pharmacologic: Heparin  Mechanical VTE Prophylaxis in Place: Yes    Patient Centered Rounds: I have performed bedside rounds with nursing staff today  Discussions with Specialists or Other Care Team Provider:     Education and Discussions with Family / Patient:     Time Spent for Care: 30 minutes  More than 50% of total time spent on counseling and coordination of care as described above  Current Length of Stay: 4 day(s)    Current Patient Status: Inpatient   Certification Statement: The patient will continue to require additional inpatient hospital stay due to HD    Discharge Plan: pending    Code Status: Level 1 - Full Code      Subjective:   Less shortness of breath for cath and HD today    Objective:     Vitals:   Temp (24hrs), Av 3 °F (36 8 °C), Min:98 2 °F (36 8 °C), Max:98 5 °F (36 9 °C)    Temp:  [98 2 °F (36 8 °C)-98 5 °F (36 9 °C)] 98 5 °F (36 9 °C)  HR:  [] 94  Resp:  [18-21] 18  BP: (141-176)/(70-93) 161/81  SpO2:  [97 %-100 %] 98 %  Body mass index is 27 88 kg/m²  Input and Output Summary (last 24 hours):        Intake/Output Summary (Last 24 hours) at 19 1143  Last data filed at 19 1055   Gross per 24 hour   Intake            298 3 ml   Output             1650 ml   Net          -1351 7 ml       Physical Exam:     Physical Exam Constitutional: He is oriented to person, place, and time  HENT:   Head: Normocephalic and atraumatic  Eyes: Pupils are equal, round, and reactive to light  Cardiovascular: Normal rate and regular rhythm  Pulmonary/Chest: Effort normal and breath sounds normal    Abdominal: Soft  Bowel sounds are normal    Musculoskeletal: He exhibits no edema  Neurological: He is alert and oriented to person, place, and time  Additional Data:     Labs:      Results from last 7 days  Lab Units 01/04/19  0450   WBC Thousand/uL 8 70   HEMOGLOBIN g/dL 10 0*   HEMATOCRIT % 30 7*   PLATELETS Thousands/uL 214   NEUTROS PCT % 75   LYMPHS PCT % 14   MONOS PCT % 9   EOS PCT % 1       Results from last 7 days  Lab Units 01/04/19  0450  01/01/19  0511   SODIUM mmol/L 131*  < > 133*   POTASSIUM mmol/L 4 6  < > 4 3   CHLORIDE mmol/L 92*  < > 96*   CO2 mmol/L 32  < > 27   BUN mg/dL 60*  < > 52*   CREATININE mg/dL 5 06*  < > 4 69*   ANION GAP mmol/L 7  < > 10   CALCIUM mg/dL 9 5  < > 9 2   ALBUMIN g/dL  --   --  2 8*   TOTAL BILIRUBIN mg/dL  --   --  0 50   ALK PHOS U/L  --   --  73   ALT U/L  --   --  17   AST U/L  --   --  16   GLUCOSE RANDOM mg/dL 254*  < > 104   < > = values in this interval not displayed  Results from last 7 days  Lab Units 12/31/18  1506   INR  1 22*       Results from last 7 days  Lab Units 12/31/18  2110 12/31/18  2020   POC GLUCOSE mg/dl 181* 174*       Results from last 7 days  Lab Units 01/02/19  0432   HEMOGLOBIN A1C % 8 2*       Results from last 7 days  Lab Units 01/01/19  1053   PROCALCITONIN ng/ml 0 07           * I Have Reviewed All Lab Data Listed Above  * Additional Pertinent Lab Tests Reviewed:  All Labs Within Last 24 Hours Reviewed    Imaging:    Imaging Reports Reviewed Today Include:   Imaging Personally Reviewed by Myself Includes:      Recent Cultures (last 7 days):           Last 24 Hours Medication List:     Current Facility-Administered Medications:  aspirin 81 mg Oral Daily Haley Browning MD   atorvastatin 10 mg Oral Daily With Saul Lowry MD   b complex-vitamin C-folic acid 1 capsule Oral Daily With Nichol Murphy MD   benzonatate 100 mg Oral TID Haley Browning MD   calcium acetate 667 mg Oral TID With Layo Batres MD   epoetin deepa 2,000 Units Intravenous Once per day on Mon Wed Fri Choco Villegas MD   And       epoetin deepa 3,000 Units Intravenous Once per day on Mon Wed Fri Choco Villegas MD   furosemide 40 mg Intravenous BID (diuretic) Haley Browning MD   heparin (porcine) 5,000 Units Subcutaneous Q8H [address] Haley Browning MD   ondansetron 4 mg Intravenous Q6H PRN Haley Browning MD   pantoprazole 40 mg Oral Early Morning Haley Browning MD        Today, Patient Was Seen By: Mana Love MD    ** Please Note: Dictation voice to text software may have been used in the creation of this document   **

## 2019-01-04 NOTE — PROGRESS NOTES
General Cardiology   Progress Note   Chase Bauer 72 y o  male MRN: [id number]  Unit/Bed#: -01 Encounter: [id number]      SUBJECTIVE:   No significant events overnight  Pt is sleeping upon arrival to room  He just had IJ catheter placed for HD  REVIEW OF SYSTEMS: unable to assess given pt is sleeping    OBJECTIVE:   Vitals:  Vitals:    01/04/19 1300   BP: 137/79   Pulse: 96   Resp: 20   Temp:    SpO2: 99%     Body mass index is 27 88 kg/m²  Systolic (08JQO), NXL:328 , Min:137 , GVI:374     Diastolic (58TLP), UGA:44, Min:69, Max:93      Intake/Output Summary (Last 24 hours) at 01/04/19 1414  Last data filed at 01/04/19 1055   Gross per 24 hour   Intake            298 3 ml   Output             1650 ml   Net          -1351 7 ml     Weight (last 2 days)     Date/Time   Weight    01/03/19 0600  76 (167 55)    01/02/19 0547  76 6 (168 87)    01/02/19 0546  76 6 (168 87)                  PHYSICAL EXAMS:  General:  Patient is not in acute distress, laying in the bed comfortably, awake, alert responding to commands  Head: Normocephalic, Atraumatic  HEENT:  Both pupils normal-size atraumatic, normocephalic, nonicteric  Neck:  JVP not raised  Trachea central  Respiratory:  Bronchovascular breathing all over the chest without any accompaniment  Cardiovascular:  S1 S2 normal RRR  GI:  Abdomen soft nontender   Liver and spleen normal size  Lymphatic:  No cervical or inguinal lymphadenopathy  Neurologic:  Patient is awake alert, responding to command, well-oriented to time and place and person moving     LABORATORY RESULTS:    Results from last 7 days  Lab Units 01/02/19  1426 01/02/19  1128 01/02/19  0432 01/01/19  0511   CK TOTAL U/L  --   --  85  --    TROPONIN I ng/mL 0 19* 0 17*  --  0 06*       CBC with diff:   Results from last 7 days  Lab Units 01/04/19  0450 01/03/19  0437 01/02/19  0432   WBC Thousand/uL 8 70 7 59 10 18*   HEMOGLOBIN g/dL 10 0* 10 0* 10 7*   HEMATOCRIT % 30 7* 31 2* 33 2*   MCV fL 80* 80* 81*   PLATELETS Thousands/uL 214 232 244   MCH pg 26 0* 25 8* 26 0*   MCHC g/dL 32 6 32 1 32 2   RDW % 15 9* 16 0* 16 3*   MPV fL 10 2 10 1 10 8   NRBC AUTO /100 WBCs 0 0 0       CMP:  Results from last 7 days  Lab Units 01/04/19  0450 01/03/19  0437 01/02/19  0432 01/01/19  0511 12/31/18  1259   POTASSIUM mmol/L 4 6 4 4 5 0 4 3 5 1   CHLORIDE mmol/L 92* 93* 94* 96* 93*   CO2 mmol/L 32 30 25 27 27   BUN mg/dL 60* 60* 58* 52* 53*   CREATININE mg/dL 5 06* 4 86* 4 78* 4 69* 4 73*   CALCIUM mg/dL 9 5 9 1 9 6 9 2 9 8   AST U/L  --   --   --  16 14   ALT U/L  --   --   --  17 23   ALK PHOS U/L  --   --   --  73 90   EGFR ml/min/1 73sq m 11 11 11 12 12       BMP:  Results from last 7 days  Lab Units 01/04/19  0450 01/03/19  0437 01/02/19  0432   POTASSIUM mmol/L 4 6 4 4 5 0   CHLORIDE mmol/L 92* 93* 94*   CO2 mmol/L 32 30 25   BUN mg/dL 60* 60* 58*   CREATININE mg/dL 5 06* 4 86* 4 78*   CALCIUM mg/dL 9 5 9 1 9 6         Results from last 7 days  Lab Units 01/02/19  0432 12/31/18  1259   NT-PRO BNP pg/mL 23,856* 22,385*            Results from last 7 days  Lab Units 01/02/19  0432   HEMOGLOBIN A1C % 8 2*           Results from last 7 days  Lab Units 12/31/18  1506   INR  1 22*       Lipid Profile:   No results found for: CHOL  No results found for: HDL  No results found for: LDLCALC  No results found for: TRIG    Cardiac testing:  No results found for this or any previous visit  No results found for this or any previous visit  No results found for this or any previous visit  No procedure found  No results found for this or any previous visit      Meds/Allergies   all current active meds have been reviewed  Prescriptions Prior to Admission   Medication    aspirin 81 mg chewable tablet    atorvastatin (LIPITOR) 10 mg tablet    benzonatate (TESSALON PERLES) 100 mg capsule    METOPROLOL SUCCINATE PO    pantoprazole (PROTONIX) 40 mg tablet    sodium bicarbonate 325 MG tablet    torsemide (DEMADEX) 20 mg tablet ASSESSMENT & PLAN   1-Acute on chronic systolic CHF, continue all meds, on IV lasix  Daily weights, salt restrict strict intake and outpt    2-Chest pain / NSTEMI, continue all meds  Likely from NATALIIA and demand ischemia  Limited echo done EF 35%  Pt was supposed to have cardiac cath but has been cancelled for now  3-ESRD on hemodialysis new for the pt  Mgmt per [facility] and renal      Ruslan Reyna PA-C  1/4/2019,2:14 PM    Portions of the record may have been created with voice recognition software   Occasional wrong word or "sound a like" substitutions may have occurred due to the inherent limitations of voice recognition software   Read the chart carefully and recognize, using context, where substitutions have occurred

## 2019-01-04 NOTE — PROGRESS NOTES
NEPHROLOGY PROGRESS NOTE    Patient: Walt Gonzales               Sex: male          DOA: 12/31/2018 12:47 PM   YOB: 1945        Age:  72 y o         LOS:  LOS: 4 days       HPI     Patient with stage 5 CKD    SUBJECTIVE     He is sleeping at this point but arousable and denies any complaint    He just at dialysis catheter place  Tolerated procedure very well      No shortness of breath does have anorexia and some nausea    CURRENT MEDICATIONS       Current Facility-Administered Medications:     aspirin chewable tablet 81 mg, 81 mg, Oral, Daily, Tanisha Zhou MD, 81 mg at 01/04/19 0848    atorvastatin (LIPITOR) tablet 10 mg, 10 mg, Oral, Daily With Arpit Hassan MD, 10 mg at 01/03/19 1641    b complex-vitamin C-folic acid (NEPHROCAPS) capsule 1 capsule, 1 capsule, Oral, Daily With Yohana Guillermo MD, 1 capsule at 01/03/19 1641    benzonatate (TESSALON PERLES) capsule 100 mg, 100 mg, Oral, TID, Tanisha Zhou MD, 100 mg at 01/04/19 0848    calcium acetate (PHOSLO) capsule 667 mg, 667 mg, Oral, TID With Meals, Leonardo Sears MD, 667 mg at 01/03/19 1641    epoetin deepa (EPOGEN,PROCRIT) injection 2,000 Units, 2,000 Units, Intravenous, Once per day on Mon Wed Fri **AND** epoetin deepa (EPOGEN,PROCRIT) injection 3,000 Units, 3,000 Units, Intravenous, Once per day on Mon Wed Fri, Leonardo Sears MD    furosemide (LASIX) injection 40 mg, 40 mg, Intravenous, BID (diuretic), Tanisha Zhou MD, 40 mg at 01/04/19 0848    heparin (porcine) subcutaneous injection 5,000 Units, 5,000 Units, Subcutaneous, Q8H [address], 5,000 Units at 01/03/19 2149 **AND** Platelet count, , , Once, Tanisha Zhou MD    ondansetron (ZOFRAN) injection 4 mg, 4 mg, Intravenous, Q6H PRN, Tanisha Zhou MD, 4 mg at 01/02/19 1125    pantoprazole (PROTONIX) EC tablet 40 mg, 40 mg, Oral, Early Morning, Tanisha Zhou MD, 40 mg at 01/04/19 0606    OBJECTIVE Current Weight: Weight - Scale: 76 kg (167 lb 8 8 oz)  Vitals:    01/04/19 0930   BP: 166/86   Pulse: 98   Resp: 21   Temp:    SpO2: 100%       Intake/Output Summary (Last 24 hours) at 01/04/19 0958  Last data filed at 01/04/19 1730   Gross per 24 hour   Intake            298 3 ml   Output             1125 ml   Net           -826 7 ml       PHYSICAL EXAMINATION     Physical Exam   Constitutional: He is oriented to person, place, and time  He appears well-developed  No distress  HENT:   Head: Normocephalic  Mouth/Throat: Oropharynx is clear and moist    Eyes: Conjunctivae are normal  No scleral icterus  Neck: Neck supple  No JVD present  Cardiovascular: Normal rate, regular rhythm, normal heart sounds and intact distal pulses  No murmur heard  Pulmonary/Chest: Effort normal and breath sounds normal  No respiratory distress  He has no wheezes  He has no rales  Abdominal: Soft  Bowel sounds are normal  He exhibits no distension and no mass  There is no tenderness  Musculoskeletal: Normal range of motion  He exhibits no edema  Neurological: He is alert and oriented to person, place, and time  Skin: Skin is warm  No rash noted  Psychiatric: He has a normal mood and affect   His behavior is normal         LAB RESULTS       Results from last 7 days  Lab Units 01/04/19  0450 01/03/19  0437 01/02/19  0432 01/01/19  0511 12/31/18 2127 12/31/18  1259   WBC Thousand/uL 8 70 7 59 10 18* 7 36  --  9 46   HEMOGLOBIN g/dL 10 0* 10 0* 10 7* 9 3*  --  11 2*   HEMATOCRIT % 30 7* 31 2* 33 2* 29 5*  --  34 5*   PLATELETS Thousands/uL 214 232 244 205 236 257   POTASSIUM mmol/L 4 6 4 4 5 0 4 3  --  5 1   CHLORIDE mmol/L 92* 93* 94* 96*  --  93*   CO2 mmol/L 32 30 25 27  --  27   BUN mg/dL 60* 60* 58* 52*  --  53*   CREATININE mg/dL 5 06* 4 86* 4 78* 4 69*  --  4 73*   EGFR ml/min/1 73sq m 11 11 11 12  --  12   CALCIUM mg/dL 9 5 9 1 9 6 9 2  --  9 8   PHOSPHORUS mg/dL  --   --  5 1*  --   --   --        RADIOLOGY RESULTS      Results for orders placed during the hospital encounter of 12/31/18   X-ray chest 1 view portable    Narrative CHEST     INDICATION:   chest pain  Cough    COMPARISON:  None    EXAM PERFORMED/VIEWS:  XR CHEST PORTABLE      FINDINGS:    There is a suboptimal inspiration  This may partly explain the prominent appearance of the heart  Cardiomegaly not excluded  There is an ill-defined right lung infiltrate in the infrahilar region and there appear to be trace pleural effusions  Left lung clear  No pneumothorax identified  Osseous structures appear within normal limits for patient age  Impression Right-sided infiltrates suspicious for developing pneumonia as well as trace pleural effusions  Questionable cardiomegaly       Limited inspiration may exaggerate some of the findings  Suggest continued follow-up preferably with deeper inspiratory effort with PA and lateral views  Workstation performed: [id number]       No results found for this or any previous visit  No results found for this or any previous visit  No results found for this or any previous visit  No results found for this or any previous visit  No results found for this or any previous visit  PLAN / RECOMMENDATIONS      CKD stage 5:  Seems to be uremic  But dialysis catheter today and will get dialyzed today    Coronary artery disease: Will get cardiac catheterization today and will dialyze him afterward    Anemia:  Will treat with Procrit on dialysis    Will continue to monitor    Iona Ascencio MD  Nephrology  1/4/2019        Portions of the record may have been created with voice recognition software  Occasional wrong word or "sound a like" substitutions may have occurred due to the inherent limitations of voice recognition software  Read the chart carefully and recognize, using context, where substitutions have occurred

## 2019-01-04 NOTE — CONSULTS
IR Consult Note    HPI:  72year old male with ESRD presents for tunneled dialysis catheter placement  PMH:  CHF  DM  HLD  HTN  MI    PSH:  Cataract surgery    Vitals:    01/04/19 0336   BP: 147/72   Pulse: 99   Resp: 18   Temp: 98 4 °F (36 9 °C)   SpO2: 100%     Gen: NAD    Coags ok    A/P:  72year old male with ESRD      - Tunneled dialysis catheter placement

## 2019-01-04 NOTE — DISCHARGE INSTRUCTIONS
Perma-cath Placement   WHAT YOU NEED TO KNOW:   A perma-cath is a catheter placed through a vein into or near your right atrium  Your right atrium is the right upper chamber of your heart  A perma-cath is used for dialysis in an emergency or until a long-term device is ready to use  After your procedure, you will have some pain and swelling on your chest and neck  You may have some bruises on your chest and neck  You may also have 2 dressings, one on your chest and one on your neck  DISCHARGE INSTRUCTIONS:   Call 911 for any of the following:   · You feel lightheaded, short of breath, and have chest pain  · Your catheter comes out   Contact Interventional Radiology at [phone] Ell[place] PATIENTS: Contact Interventional Radiology at [phone]) Dilip Price PATIENTS: Contact Interventional Radiology at [phone]) if:  · Blood soaks through your bandage  · You have new swelling in your arm, neck, face, or chest on your right side  · Your catheter gets wet  · Your bruises or pain get worse  · You have a fever or chills  · Persistent nausea or vomiting  · Your incision is red, swollen, or draining pus  · You have questions or concerns about your condition or care  Self-care:       · Resume your normal diet  · Keep your dressings dry  Do not take a shower or swim  You may take a tub bath, but do not get your dressings wet  Water in your wound can cause bacteria to grow and cause an infection  If your dressing gets wet, dry it off and cover it with dry sterile gauze  Call your healthcare provider  Do not use soaps or ointments  · Do not change your dressings  Your healthcare provider or dialysis nurse will change your dressings  Your dressings should stay in place until your healthcare provider removes them  The dressing on your chest will stay as long as you have the catheter in place  The dressing prevents infection  · Do not remove the red and blue caps from the end of your catheter   The caps prevent air from getting into your catheter    Follow up with your healthcare provider as directed: Write down your questions so you remember to ask them during your visits

## 2019-01-04 NOTE — SOCIAL WORK
CM name and role reviewed  Discharge Checklist reviewed and CM will continue to monitor for progress toward discharge goals in nursing and provider rounds  CM met with pt at bedside  Pt alert  Pt accompanied by family member  Pt stated that he lives at home with his wife and son  Pt reported that he needs some assistance with ADLs  He said that he uses a cane but has had to use a walker more lately  He said that he has been having difficulty with walking  He said that he has hx of VNA but unsure of the agency  He asked about more services at home  CM informed him that CM can look into Waiver Program for [employer]   CM informed him that he will need to apply for MA  Pt interested in MA application  Pt stated that he uses [facility] in [place]  He said that he uses [facility]  He said that his son provides transportation  However, he said that he would like to know about another transportation option  CM informed him that CM will provide him with a Debbie Terry application  CM reviewed discharge planning process including the following: identifying help at home, patient preference for discharge planning needs, pharmacy preference, and availability of treatment team to discuss questions or concerns patient and/or family may have regarding understanding medications and recognizing signs and symptoms once discharged  CM also encouraged patient to follow up with all recommended appointments after discharge  Patient advised of importance for patient and family to participate in managing patients medical well being

## 2019-01-04 NOTE — HEMODIALYSIS
Patient had first hd tx today  BP stable throughout tx, able to maintain bfr, removed 3 kg of fluid  Will have second hd tx tomorrow

## 2019-01-04 NOTE — PROCEDURES
Central Line Insertion  Date/Time: 1/4/2019 7:52 AM  Performed by: Judge Ramirez  Authorized by: Judge Ramirez     Patient location:  IR  Consent:     Consent obtained:  Written    Consent given by:  Patient    Risks discussed:  Bleeding and infection  Universal protocol:     Procedure explained and questions answered to patient or proxy's satisfaction: yes      Relevant documents present and verified: yes      Test results available and properly labeled: yes      Radiology Images displayed and confirmed  If images not available, report reviewed: yes      Required blood products, implants, devices, and special equipment available: yes      Immediately prior to procedure, a time out was called: yes      Patient identity confirmed:  Provided demographic data  Pre-procedure details:     Hand hygiene: Hand hygiene performed prior to insertion      Sterile barrier technique: All elements of maximal sterile technique followed      Skin preparation:  2% chlorhexidine  Indications:     Central line indications: dialysis    Sedation:     Sedation type: Moderate (conscious) sedation  Anesthesia (see MAR for exact dosages): Anesthesia method:  Local infiltration    Local anesthetic:  Lidocaine 1% WITH epi  Procedure details:     Location:  Right internal jugular    Vessel type: vein      Laterality:  Right    Approach: percutaneous technique used      Patient position:  Flat    Catheter type:  Double lumen    Catheter size:  14 Fr    Ultrasound guidance: yes      Sterile ultrasound techniques: Sterile gel and sterile probe covers were used      Number of attempts:  1    Successful placement: yes      Vessel of catheter tip end:  Right atrium  Post-procedure details:     Post-procedure:  Dressing applied and line sutured    Assessment:  Blood return through all ports, placement verified by x-ray and free fluid flow    Post-procedure complications: none      Patient tolerance of procedure:   Tolerated well, no immediate complications  Comments:      14 5 Fr 28 cm Medcomp Hemo-flow double lumen dialysis catheter

## 2019-01-04 NOTE — SOCIAL WORK
CM provided pt with MA application  Pt needs assistance with [employer]  Awaiting chair time at [facility] in [facility]

## 2019-01-05 ENCOUNTER — APPOINTMENT (INPATIENT)
Dept: DIALYSIS | Facility: HOSPITAL | Age: 74
DRG: 673 | End: 2019-01-05
Payer: MEDICARE

## 2019-01-05 LAB
ANION GAP SERPL CALCULATED.3IONS-SCNC: 6 MMOL/L (ref 4–13)
BASOPHILS # BLD AUTO: 0.04 THOUSANDS/ΜL (ref 0–0.1)
BASOPHILS NFR BLD AUTO: 1 % (ref 0–1)
BUN SERPL-MCNC: 32 MG/DL (ref 5–25)
CALCIUM SERPL-MCNC: 8.9 MG/DL (ref 8.3–10.1)
CHLORIDE SERPL-SCNC: 96 MMOL/L (ref 100–108)
CO2 SERPL-SCNC: 33 MMOL/L (ref 21–32)
CREAT SERPL-MCNC: 3.32 MG/DL (ref 0.6–1.3)
EOSINOPHIL # BLD AUTO: 0.02 THOUSAND/ΜL (ref 0–0.61)
EOSINOPHIL NFR BLD AUTO: 0 % (ref 0–6)
ERYTHROCYTE [DISTWIDTH] IN BLOOD BY AUTOMATED COUNT: 16.3 % (ref 11.6–15.1)
GFR SERPL CREATININE-BSD FRML MDRD: 18 ML/MIN/1.73SQ M
GLUCOSE SERPL-MCNC: 272 MG/DL (ref 65–140)
HBV CORE AB SER QL: REACTIVE
HBV CORE IGM SER QL: ABNORMAL
HBV SURFACE AB SER-ACNC: 40.4 MIU/ML
HBV SURFACE AG SER QL: ABNORMAL
HCT VFR BLD AUTO: 32.6 % (ref 36.5–49.3)
HCV AB SER QL: ABNORMAL
HGB BLD-MCNC: 10.2 G/DL (ref 12–17)
IMM GRANULOCYTES # BLD AUTO: 0.04 THOUSAND/UL (ref 0–0.2)
IMM GRANULOCYTES NFR BLD AUTO: 1 % (ref 0–2)
LYMPHOCYTES # BLD AUTO: 1.28 THOUSANDS/ΜL (ref 0.6–4.47)
LYMPHOCYTES NFR BLD AUTO: 15 % (ref 14–44)
MCH RBC QN AUTO: 25.8 PG (ref 26.8–34.3)
MCHC RBC AUTO-ENTMCNC: 31.3 G/DL (ref 31.4–37.4)
MCV RBC AUTO: 82 FL (ref 82–98)
MONOCYTES # BLD AUTO: 0.76 THOUSAND/ΜL (ref 0.17–1.22)
MONOCYTES NFR BLD AUTO: 9 % (ref 4–12)
NEUTROPHILS # BLD AUTO: 6.18 THOUSANDS/ΜL (ref 1.85–7.62)
NEUTS SEG NFR BLD AUTO: 74 % (ref 43–75)
NRBC BLD AUTO-RTO: 0 /100 WBCS
PLATELET # BLD AUTO: 186 THOUSANDS/UL (ref 149–390)
PMV BLD AUTO: 11.1 FL (ref 8.9–12.7)
POTASSIUM SERPL-SCNC: 4.5 MMOL/L (ref 3.5–5.3)
RBC # BLD AUTO: 3.96 MILLION/UL (ref 3.88–5.62)
SODIUM SERPL-SCNC: 135 MMOL/L (ref 136–145)
TROPONIN I SERPL-MCNC: 0.34 NG/ML
WBC # BLD AUTO: 8.32 THOUSAND/UL (ref 4.31–10.16)

## 2019-01-05 PROCEDURE — 5A1D70Z PERFORMANCE OF URINARY FILTRATION, INTERMITTENT, LESS THAN 6 HOURS PER DAY: ICD-10-PCS | Performed by: INTERNAL MEDICINE

## 2019-01-05 PROCEDURE — 90935 HEMODIALYSIS ONE EVALUATION: CPT | Performed by: INTERNAL MEDICINE

## 2019-01-05 PROCEDURE — 84484 ASSAY OF TROPONIN QUANT: CPT | Performed by: PHYSICIAN ASSISTANT

## 2019-01-05 PROCEDURE — 85025 COMPLETE CBC W/AUTO DIFF WBC: CPT | Performed by: INTERNAL MEDICINE

## 2019-01-05 PROCEDURE — 80048 BASIC METABOLIC PNL TOTAL CA: CPT | Performed by: INTERNAL MEDICINE

## 2019-01-05 PROCEDURE — 93005 ELECTROCARDIOGRAM TRACING: CPT

## 2019-01-05 PROCEDURE — 99232 SBSQ HOSP IP/OBS MODERATE 35: CPT | Performed by: GENERAL PRACTICE

## 2019-01-05 RX ORDER — ACETAMINOPHEN 325 MG/1
650 TABLET ORAL EVERY 6 HOURS PRN
Status: DISCONTINUED | OUTPATIENT
Start: 2019-01-05 | End: 2019-01-08 | Stop reason: HOSPADM

## 2019-01-05 RX ADMIN — ATORVASTATIN CALCIUM 10 MG: 10 TABLET, FILM COATED ORAL at 17:21

## 2019-01-05 RX ADMIN — BENZONATATE 100 MG: 100 CAPSULE ORAL at 17:21

## 2019-01-05 RX ADMIN — ACETAMINOPHEN 650 MG: 325 TABLET, FILM COATED ORAL at 20:43

## 2019-01-05 RX ADMIN — BENZONATATE 100 MG: 100 CAPSULE ORAL at 20:43

## 2019-01-05 RX ADMIN — HEPARIN SODIUM 5000 UNITS: 5000 INJECTION, SOLUTION INTRAVENOUS; SUBCUTANEOUS at 22:11

## 2019-01-05 RX ADMIN — ACETAMINOPHEN 650 MG: 325 TABLET, FILM COATED ORAL at 12:14

## 2019-01-05 RX ADMIN — HEPARIN SODIUM 5000 UNITS: 5000 INJECTION, SOLUTION INTRAVENOUS; SUBCUTANEOUS at 05:38

## 2019-01-05 RX ADMIN — Medication 1 CAPSULE: at 17:21

## 2019-01-05 RX ADMIN — ASPIRIN 81 MG 81 MG: 81 TABLET ORAL at 12:19

## 2019-01-05 RX ADMIN — BENZONATATE 100 MG: 100 CAPSULE ORAL at 12:17

## 2019-01-05 RX ADMIN — METOPROLOL TARTRATE 25 MG: 25 TABLET, FILM COATED ORAL at 23:04

## 2019-01-05 RX ADMIN — FUROSEMIDE 40 MG: 10 INJECTION, SOLUTION INTRAMUSCULAR; INTRAVENOUS at 17:21

## 2019-01-05 RX ADMIN — ONDANSETRON 4 MG: 2 INJECTION INTRAMUSCULAR; INTRAVENOUS at 12:20

## 2019-01-05 RX ADMIN — HEPARIN SODIUM 5000 UNITS: 5000 INJECTION, SOLUTION INTRAVENOUS; SUBCUTANEOUS at 14:25

## 2019-01-05 RX ADMIN — CALCIUM ACETATE 667 MG: 667 CAPSULE ORAL at 17:21

## 2019-01-05 RX ADMIN — PANTOPRAZOLE SODIUM 40 MG: 40 TABLET, DELAYED RELEASE ORAL at 05:38

## 2019-01-05 RX ADMIN — FUROSEMIDE 40 MG: 10 INJECTION, SOLUTION INTRAMUSCULAR; INTRAVENOUS at 12:20

## 2019-01-05 NOTE — PROGRESS NOTES
HEMODIALYSIS ROUNDING NOTE    Patient: Brandi Rocha               Sex: male          DOA: 12/31/2018 12:47 PM   YOB: 1945        Age:  72 y o         LOS:  LOS: 5 days             SUBJECTIVE     - Patient was seen during hemodialysis today  - Reviewed last 24 hrs events     Second treatment were this patient  Not very happy  Wants to go home  I think he is doing better  He is more awake  Seems to getting stronger  He still not sure about dialysis but will continue  No chest pain no palpitation or shortness of Breath  I think is hepatitis is coming back as he was asking for food    No nausea no vomiting    CURRENT MEDICATIONS       Current Facility-Administered Medications:     aspirin chewable tablet 81 mg, 81 mg, Oral, Daily, Cheo Peck MD, 81 mg at 01/04/19 0848    atorvastatin (LIPITOR) tablet 10 mg, 10 mg, Oral, Daily With Danna Villalba MD, 10 mg at 01/04/19 1826    b complex-vitamin C-folic acid (NEPHROCAPS) capsule 1 capsule, 1 capsule, Oral, Daily With Desmond Thompson MD, 1 capsule at 01/04/19 1827    benzonatate (TESSALON PERLES) capsule 100 mg, 100 mg, Oral, TID, Cheo Pcek MD, 100 mg at 01/04/19 2131    calcium acetate (PHOSLO) capsule 667 mg, 667 mg, Oral, TID With Meals, Fred Higginbotham MD, 667 mg at 01/04/19 1826    epoetin deepa (EPOGEN,PROCRIT) injection 2,000 Units, 2,000 Units, Intravenous, Once per day on Mon Wed Fri, 2,000 Units at 01/04/19 1514 **AND** epoetin deepa (EPOGEN,PROCRIT) injection 3,000 Units, 3,000 Units, Intravenous, Once per day on Mon Wed Fri, Fred Higginbotham MD, 3,000 Units at 01/04/19 1514    furosemide (LASIX) injection 40 mg, 40 mg, Intravenous, BID (diuretic), Cheo Peck MD, 40 mg at 01/04/19 1828    heparin (porcine) subcutaneous injection 5,000 Units, 5,000 Units, Subcutaneous, Q8H [facility], 5,000 Units at 01/05/19 0538 **AND** Platelet count, , , Once, MD Ceci Cameron ondansetron (ZOFRAN) injection 4 mg, 4 mg, Intravenous, Q6H PRN, Valentino Karst A Jimenez-Arias, MD, 4 mg at 01/04/19 2319    pantoprazole (PROTONIX) EC tablet 40 mg, 40 mg, Oral, Early Morning, [place] EDEN Raymundo MD, 40 mg at 01/05/19 0538    OBJECTIVE     Current Weight: Weight - Scale: 74 8 kg (164 lb 14 5 oz)  Vitals:    01/05/19 0930   BP: 147/81   Pulse: 96   Resp:    Temp:    SpO2:        Intake/Output Summary (Last 24 hours) at 01/05/19 0953  Last data filed at 01/05/19 0815   Gross per 24 hour   Intake             1030 ml   Output             3025 ml   Net            -1995 ml       PHYSICAL EXAMINATION     Physical Exam   Constitutional: He is oriented to person, place, and time  He appears well-developed  No distress  HENT:   Head: Normocephalic  Mouth/Throat: Oropharynx is clear and moist    Eyes: Conjunctivae are normal  No scleral icterus  Neck: Neck supple  No JVD present  Cardiovascular: Normal rate and normal heart sounds  Pulmonary/Chest: Effort normal  He has no wheezes  Abdominal: Soft  There is no tenderness  Musculoskeletal: Normal range of motion  He exhibits no edema  Neurological: He is alert and oriented to person, place, and time  Skin: Skin is warm  No rash noted  Psychiatric: He has a normal mood and affect   His behavior is normal          LAB RESULTS       Results from last 7 days  Lab Units 01/05/19  0535 01/04/19  0450 01/03/19  0437 01/02/19  0432 01/01/19  0511 12/31/18  2127 12/31/18  1259   WBC Thousand/uL 8 32 8 70 7 59 10 18* 7 36  --  9 46   HEMOGLOBIN g/dL 10 2* 10 0* 10 0* 10 7* 9 3*  --  11 2*   HEMATOCRIT % 32 6* 30 7* 31 2* 33 2* 29 5*  --  34 5*   PLATELETS Thousands/uL 186 214 232 244 205 236 257   POTASSIUM mmol/L 4 5 4 6 4 4 5 0 4 3  --  5 1   CHLORIDE mmol/L 96* 92* 93* 94* 96*  --  93*   CO2 mmol/L 33* 32 30 25 27  --  27   BUN mg/dL 32* 60* 60* 58* 52*  --  53*   CREATININE mg/dL 3 32* 5 06* 4 86* 4 78* 4 69*  --  4 73*   EGFR ml/min/1 73sq m 18 11 11 11 12  --  12   CALCIUM mg/dL 8 9 9 5 9 1 9 6 9 2  --  9 8   PHOSPHORUS mg/dL  --   --   --  5 1*  --   --   --        RADIOLOGY RESULTS     Results for orders placed during the hospital encounter of 12/31/18   X-ray chest 1 view portable    Narrative CHEST     INDICATION:   chest pain  Cough    COMPARISON:  None    EXAM PERFORMED/VIEWS:  XR CHEST PORTABLE      FINDINGS:    There is a suboptimal inspiration  This may partly explain the prominent appearance of the heart  Cardiomegaly not excluded  There is an ill-defined right lung infiltrate in the infrahilar region and there appear to be trace pleural effusions  Left lung clear  No pneumothorax identified  Osseous structures appear within normal limits for patient age  Impression Right-sided infiltrates suspicious for developing pneumonia as well as trace pleural effusions  Questionable cardiomegaly       Limited inspiration may exaggerate some of the findings  Suggest continued follow-up preferably with deeper inspiratory effort with PA and lateral views  Workstation performed: [id number]       No results found for this or any previous visit  No results found for this or any previous visit  No results found for this or any previous visit  No results found for this or any previous visit  No results found for this or any previous visit  PLAN / RECOMMENDATIONS     1  End Stage Renal Disease:       I saw and examined patient during hemodialysis treatment  The patient was receiving hemodialysis for treatment of end stage renal disease  I have also reviewed vital signs, intake and output, lab results and recent events, and agreed with today's dialysis order  Access: No issue  Has catheter which is working quite well    2  Anemia:   will continue with Procrit    3   Disposition:  Will need outpatient dialysis unit before discharge    Eron Mcgovern MD  Nephrology  1/5/2019        Portions of the record may have been created with voice recognition software  Occasional wrong word or "sound a like" substitutions may have occurred due to the inherent limitations of voice recognition software  Read the chart carefully and recognize, using context, where substitutions have occurred

## 2019-01-05 NOTE — ASSESSMENT & PLAN NOTE
Cardiology following  with CHF and ckd  For HD  Continue current meds  Cardiac cath pending next week

## 2019-01-05 NOTE — PLAN OF CARE
CARDIOVASCULAR - ADULT     Maintains optimal cardiac output and hemodynamic stability Progressing     Absence of cardiac dysrhythmias or at baseline rhythm Progressing        DISCHARGE PLANNING     Discharge to home or other facility with appropriate resources Progressing        DISCHARGE PLANNING - CARE MANAGEMENT     Discharge to post-acute care or home with appropriate resources Progressing        INFECTION - ADULT     Absence or prevention of progression during hospitalization Progressing        Knowledge Deficit     Patient/family/caregiver demonstrates understanding of disease process, treatment plan, medications, and discharge instructions Progressing        METABOLIC, FLUID AND ELECTROLYTES - ADULT     Electrolytes maintained within normal limits Progressing     Fluid balance maintained Progressing     Glucose maintained within target range Progressing        Nutrition/Hydration-ADULT     Nutrient/Hydration intake appropriate for improving, restoring or maintaining nutritional needs Progressing        PAIN - ADULT     Verbalizes/displays adequate comfort level or baseline comfort level Progressing        Potential for Falls     Patient will remain free of falls Progressing        Prexisting or High Potential for Compromised Skin Integrity     Skin integrity is maintained or improved Progressing        RESPIRATORY - ADULT     Achieves optimal ventilation and oxygenation Progressing        SAFETY ADULT     Maintain or return to baseline ADL function Progressing     Maintain or return mobility status to optimal level Progressing

## 2019-01-05 NOTE — HEMODIALYSIS
Pt received dialysis today without difficulty-removing 2 L during a 3 1/2 hr tx  However pt was not pleased with length of tx or the continued hospital stay

## 2019-01-05 NOTE — PROGRESS NOTES
Progress Note Eleazar Scott 1945, 72 y o  male MRN: [id number]    Unit/Bed#: -01 Encounter: [id number]    Primary Care Provider: No primary care provider on file  Date and time admitted to hospital: 2018 12:47 PM        Acute on chronic congestive heart failure ([address] )   Assessment & Plan    With CKD-started HD  Cardiology following  Nephrology following     MI, acute, non ST segment elevation [facility])   Assessment & Plan    Cardiology following  with CHF and ckd  For HD  Continue current meds  Cardiac cath pending next week     NATALIIA (acute kidney injury) [facility])   Assessment & Plan    With CKD for HD     Chest pain   Assessment & Plan    resolved     * Shortness of breath   Assessment & Plan    Resolved with HD         VTE Pharmacologic Prophylaxis:   Pharmacologic: Heparin  Mechanical VTE Prophylaxis in Place: Yes    Patient Centered Rounds: I have performed bedside rounds with nursing staff today  Discussions with Specialists or Other Care Team Provider:     Education and Discussions with Family / Patient:     Time Spent for Care: 30 minutes  More than 50% of total time spent on counseling and coordination of care as described above  Current Length of Stay: 5 day(s)    Current Patient Status: Inpatient   Certification Statement: The patient will continue to require additional inpatient hospital stay due to starting HD    Discharge Plan: home    Code Status: Level 1 - Full Code      Subjective:   Denies chest pain-wants to go home    Objective:     Vitals:   Temp (24hrs), Av 3 °F (36 8 °C), Min:97 8 °F (36 6 °C), Max:99 °F (37 2 °C)    Temp:  [97 8 °F (36 6 °C)-99 °F (37 2 °C)] 99 °F (37 2 °C)  HR:  [] 91  Resp:  [16-20] 20  BP: (110-153)/(63-86) 132/66  SpO2:  [98 %-100 %] 99 %  Body mass index is 27 44 kg/m²  Input and Output Summary (last 24 hours):        Intake/Output Summary (Last 24 hours) at 19 1148  Last data filed at 19 0815   Gross per 24 hour   Intake 1030 ml   Output             2500 ml   Net            -1470 ml       Physical Exam:     Physical Exam   Constitutional: He is oriented to person, place, and time  He appears well-developed and well-nourished  HENT:   Head: Normocephalic and atraumatic  Eyes: Pupils are equal, round, and reactive to light  Cardiovascular: Normal rate and regular rhythm  Pulmonary/Chest: Effort normal and breath sounds normal    Abdominal: Soft  Bowel sounds are normal    Musculoskeletal: He exhibits no edema  Neurological: He is alert and oriented to person, place, and time  Additional Data:     Labs:      Results from last 7 days  Lab Units 01/05/19  0535   WBC Thousand/uL 8 32   HEMOGLOBIN g/dL 10 2*   HEMATOCRIT % 32 6*   PLATELETS Thousands/uL 186   NEUTROS PCT % 74   LYMPHS PCT % 15   MONOS PCT % 9   EOS PCT % 0       Results from last 7 days  Lab Units 01/05/19  0535  01/01/19  0511   SODIUM mmol/L 135*  < > 133*   POTASSIUM mmol/L 4 5  < > 4 3   CHLORIDE mmol/L 96*  < > 96*   CO2 mmol/L 33*  < > 27   BUN mg/dL 32*  < > 52*   CREATININE mg/dL 3 32*  < > 4 69*   ANION GAP mmol/L 6  < > 10   CALCIUM mg/dL 8 9  < > 9 2   ALBUMIN g/dL  --   --  2 8*   TOTAL BILIRUBIN mg/dL  --   --  0 50   ALK PHOS U/L  --   --  73   ALT U/L  --   --  17   AST U/L  --   --  16   GLUCOSE RANDOM mg/dL 272*  < > 104   < > = values in this interval not displayed  Results from last 7 days  Lab Units 12/31/18  1506   INR  1 22*       Results from last 7 days  Lab Units 12/31/18  2110 12/31/18  2020   POC GLUCOSE mg/dl 181* 174*       Results from last 7 days  Lab Units 01/02/19  0432   HEMOGLOBIN A1C % 8 2*       Results from last 7 days  Lab Units 01/01/19  1053   PROCALCITONIN ng/ml 0 07           * I Have Reviewed All Lab Data Listed Above  * Additional Pertinent Lab Tests Reviewed:  All Labs Within Last 24 Hours Reviewed    Imaging:    Imaging Reports Reviewed Today Include:   Imaging Personally Reviewed by Myself Includes:      Recent Cultures (last 7 days):           Last 24 Hours Medication List:     Current Facility-Administered Medications:  aspirin 81 mg Oral Daily Geofm Councilman, MD   atorvastatin 10 mg Oral Daily With Julissa Rhodes MD   b complex-vitamin C-folic acid 1 capsule Oral Daily With Moreno Cline MD   benzonatate 100 mg Oral TID Geofm Councilman, MD   calcium acetate 667 mg Oral TID With Clay Blair MD   epoetin deepa 2,000 Units Intravenous Once per day on Mon Wed Fri Harpal Vann MD   And       epoetin deepa 3,000 Units Intravenous Once per day on Mon Wed Fri Harpal Vann MD   furosemide 40 mg Intravenous BID (diuretic) Geofm Councilman, MD   heparin (porcine) 5,000 Units Subcutaneous Q8H [address] Geofm Councilman, MD   ondansetron 4 mg Intravenous Q6H PRN Geofm Councilman, MD   pantoprazole 40 mg Oral Early Morning Geofm Councilman, MD        Today, Patient Was Seen By: Jesenia Alvarado MD    ** Please Note: Dictation voice to text software may have been used in the creation of this document   **

## 2019-01-06 LAB
ALBUMIN SERPL BCP-MCNC: 2.4 G/DL (ref 3.5–5)
ALP SERPL-CCNC: 61 U/L (ref 46–116)
ALT SERPL W P-5'-P-CCNC: 20 U/L (ref 12–78)
ANION GAP SERPL CALCULATED.3IONS-SCNC: 5 MMOL/L (ref 4–13)
APTT PPP: 113 SECONDS (ref 26–38)
APTT PPP: 33 SECONDS (ref 26–38)
APTT PPP: 41 SECONDS (ref 26–38)
AST SERPL W P-5'-P-CCNC: 14 U/L (ref 5–45)
ATRIAL RATE: 85 BPM
ATRIAL RATE: 94 BPM
BASOPHILS # BLD AUTO: 0.05 THOUSANDS/ΜL (ref 0–0.1)
BASOPHILS NFR BLD AUTO: 1 % (ref 0–1)
BILIRUB SERPL-MCNC: 0.4 MG/DL (ref 0.2–1)
BUN SERPL-MCNC: 25 MG/DL (ref 5–25)
CALCIUM SERPL-MCNC: 8.1 MG/DL (ref 8.3–10.1)
CHLORIDE SERPL-SCNC: 95 MMOL/L (ref 100–108)
CO2 SERPL-SCNC: 32 MMOL/L (ref 21–32)
CREAT SERPL-MCNC: 3.25 MG/DL (ref 0.6–1.3)
EOSINOPHIL # BLD AUTO: 0.05 THOUSAND/ΜL (ref 0–0.61)
EOSINOPHIL NFR BLD AUTO: 1 % (ref 0–6)
ERYTHROCYTE [DISTWIDTH] IN BLOOD BY AUTOMATED COUNT: 16.3 % (ref 11.6–15.1)
ERYTHROCYTE [DISTWIDTH] IN BLOOD BY AUTOMATED COUNT: 16.5 % (ref 11.6–15.1)
GFR SERPL CREATININE-BSD FRML MDRD: 18 ML/MIN/1.73SQ M
GLUCOSE SERPL-MCNC: 385 MG/DL (ref 65–140)
HCT VFR BLD AUTO: 32.5 % (ref 36.5–49.3)
HCT VFR BLD AUTO: 34 % (ref 36.5–49.3)
HGB BLD-MCNC: 10 G/DL (ref 12–17)
HGB BLD-MCNC: 10.6 G/DL (ref 12–17)
IMM GRANULOCYTES # BLD AUTO: 0.03 THOUSAND/UL (ref 0–0.2)
IMM GRANULOCYTES NFR BLD AUTO: 0 % (ref 0–2)
INR PPP: 1.09 (ref 0.86–1.17)
LYMPHOCYTES # BLD AUTO: 1.27 THOUSANDS/ΜL (ref 0.6–4.47)
LYMPHOCYTES NFR BLD AUTO: 16 % (ref 14–44)
MCH RBC QN AUTO: 25.4 PG (ref 26.8–34.3)
MCH RBC QN AUTO: 25.7 PG (ref 26.8–34.3)
MCHC RBC AUTO-ENTMCNC: 30.8 G/DL (ref 31.4–37.4)
MCHC RBC AUTO-ENTMCNC: 31.2 G/DL (ref 31.4–37.4)
MCV RBC AUTO: 83 FL (ref 82–98)
MCV RBC AUTO: 83 FL (ref 82–98)
MONOCYTES # BLD AUTO: 0.73 THOUSAND/ΜL (ref 0.17–1.22)
MONOCYTES NFR BLD AUTO: 9 % (ref 4–12)
NEUTROPHILS # BLD AUTO: 5.84 THOUSANDS/ΜL (ref 1.85–7.62)
NEUTS SEG NFR BLD AUTO: 73 % (ref 43–75)
NRBC BLD AUTO-RTO: 0 /100 WBCS
P AXIS: 28 DEGREES
P AXIS: 76 DEGREES
PLATELET # BLD AUTO: 147 THOUSANDS/UL (ref 149–390)
PLATELET # BLD AUTO: 148 THOUSANDS/UL (ref 149–390)
PMV BLD AUTO: 10.9 FL (ref 8.9–12.7)
PMV BLD AUTO: 11.6 FL (ref 8.9–12.7)
POTASSIUM SERPL-SCNC: 4.3 MMOL/L (ref 3.5–5.3)
PR INTERVAL: 254 MS
PR INTERVAL: 260 MS
PROT SERPL-MCNC: 6.8 G/DL (ref 6.4–8.2)
PROTHROMBIN TIME: 14 SECONDS (ref 11.8–14.2)
QRS AXIS: -19 DEGREES
QRS AXIS: -22 DEGREES
QRSD INTERVAL: 94 MS
QRSD INTERVAL: 98 MS
QT INTERVAL: 374 MS
QT INTERVAL: 394 MS
QTC INTERVAL: 467 MS
QTC INTERVAL: 468 MS
RBC # BLD AUTO: 3.94 MILLION/UL (ref 3.88–5.62)
RBC # BLD AUTO: 4.12 MILLION/UL (ref 3.88–5.62)
SODIUM SERPL-SCNC: 132 MMOL/L (ref 136–145)
T WAVE AXIS: 125 DEGREES
T WAVE AXIS: 144 DEGREES
TROPONIN I SERPL-MCNC: 0.3 NG/ML
TROPONIN I SERPL-MCNC: 0.33 NG/ML
TROPONIN I SERPL-MCNC: 0.37 NG/ML
TROPONIN I SERPL-MCNC: 0.39 NG/ML
VENTRICULAR RATE: 85 BPM
VENTRICULAR RATE: 94 BPM
WBC # BLD AUTO: 7.97 THOUSAND/UL (ref 4.31–10.16)
WBC # BLD AUTO: 8.81 THOUSAND/UL (ref 4.31–10.16)

## 2019-01-06 PROCEDURE — 85610 PROTHROMBIN TIME: CPT | Performed by: GENERAL PRACTICE

## 2019-01-06 PROCEDURE — 93010 ELECTROCARDIOGRAM REPORT: CPT | Performed by: INTERNAL MEDICINE

## 2019-01-06 PROCEDURE — 85730 THROMBOPLASTIN TIME PARTIAL: CPT | Performed by: INTERNAL MEDICINE

## 2019-01-06 PROCEDURE — 99232 SBSQ HOSP IP/OBS MODERATE 35: CPT | Performed by: INTERNAL MEDICINE

## 2019-01-06 PROCEDURE — 85730 THROMBOPLASTIN TIME PARTIAL: CPT | Performed by: GENERAL PRACTICE

## 2019-01-06 PROCEDURE — 84484 ASSAY OF TROPONIN QUANT: CPT | Performed by: PHYSICIAN ASSISTANT

## 2019-01-06 PROCEDURE — 93005 ELECTROCARDIOGRAM TRACING: CPT

## 2019-01-06 PROCEDURE — 80053 COMPREHEN METABOLIC PANEL: CPT | Performed by: INTERNAL MEDICINE

## 2019-01-06 PROCEDURE — 99232 SBSQ HOSP IP/OBS MODERATE 35: CPT | Performed by: GENERAL PRACTICE

## 2019-01-06 PROCEDURE — 85025 COMPLETE CBC W/AUTO DIFF WBC: CPT | Performed by: INTERNAL MEDICINE

## 2019-01-06 PROCEDURE — 84484 ASSAY OF TROPONIN QUANT: CPT | Performed by: GENERAL PRACTICE

## 2019-01-06 PROCEDURE — 85027 COMPLETE CBC AUTOMATED: CPT | Performed by: GENERAL PRACTICE

## 2019-01-06 RX ORDER — NITROGLYCERIN 0.4 MG/1
TABLET SUBLINGUAL
Status: COMPLETED
Start: 2019-01-06 | End: 2019-01-06

## 2019-01-06 RX ORDER — LANOLIN ALCOHOL/MO/W.PET/CERES
3 CREAM (GRAM) TOPICAL
Status: DISCONTINUED | OUTPATIENT
Start: 2019-01-06 | End: 2019-01-08 | Stop reason: HOSPADM

## 2019-01-06 RX ORDER — HEPARIN SODIUM 10000 [USP'U]/100ML
3-20 INJECTION, SOLUTION INTRAVENOUS
Status: DISCONTINUED | OUTPATIENT
Start: 2019-01-06 | End: 2019-01-08 | Stop reason: HOSPADM

## 2019-01-06 RX ORDER — HEPARIN SODIUM 1000 [USP'U]/ML
2000 INJECTION, SOLUTION INTRAVENOUS; SUBCUTANEOUS AS NEEDED
Status: DISCONTINUED | OUTPATIENT
Start: 2019-01-06 | End: 2019-01-08 | Stop reason: HOSPADM

## 2019-01-06 RX ORDER — HEPARIN SODIUM 1000 [USP'U]/ML
4000 INJECTION, SOLUTION INTRAVENOUS; SUBCUTANEOUS AS NEEDED
Status: DISCONTINUED | OUTPATIENT
Start: 2019-01-06 | End: 2019-01-08 | Stop reason: HOSPADM

## 2019-01-06 RX ADMIN — METOPROLOL TARTRATE 25 MG: 25 TABLET, FILM COATED ORAL at 08:25

## 2019-01-06 RX ADMIN — CALCIUM ACETATE 667 MG: 667 CAPSULE ORAL at 07:47

## 2019-01-06 RX ADMIN — NITROGLYCERIN: 0.4 TABLET SUBLINGUAL at 17:18

## 2019-01-06 RX ADMIN — ASPIRIN 81 MG 81 MG: 81 TABLET ORAL at 08:25

## 2019-01-06 RX ADMIN — BENZONATATE 100 MG: 100 CAPSULE ORAL at 08:25

## 2019-01-06 RX ADMIN — MELATONIN 3 MG: 3 TAB ORAL at 22:12

## 2019-01-06 RX ADMIN — FUROSEMIDE 40 MG: 10 INJECTION, SOLUTION INTRAMUSCULAR; INTRAVENOUS at 17:02

## 2019-01-06 RX ADMIN — BENZONATATE 100 MG: 100 CAPSULE ORAL at 17:02

## 2019-01-06 RX ADMIN — NITROGLYCERIN 0.4 MG: 0.4 TABLET SUBLINGUAL at 17:13

## 2019-01-06 RX ADMIN — PANTOPRAZOLE SODIUM 40 MG: 40 TABLET, DELAYED RELEASE ORAL at 05:19

## 2019-01-06 RX ADMIN — CALCIUM ACETATE 667 MG: 667 CAPSULE ORAL at 17:03

## 2019-01-06 RX ADMIN — Medication 1 CAPSULE: at 17:02

## 2019-01-06 RX ADMIN — NITROGLYCERIN 1 INCH: 20 OINTMENT TOPICAL at 17:38

## 2019-01-06 RX ADMIN — HEPARIN SODIUM 5000 UNITS: 5000 INJECTION, SOLUTION INTRAVENOUS; SUBCUTANEOUS at 05:19

## 2019-01-06 RX ADMIN — CALCIUM ACETATE 667 MG: 667 CAPSULE ORAL at 13:36

## 2019-01-06 RX ADMIN — HEPARIN SODIUM 5000 UNITS: 5000 INJECTION, SOLUTION INTRAVENOUS; SUBCUTANEOUS at 13:36

## 2019-01-06 RX ADMIN — ACETAMINOPHEN 650 MG: 325 TABLET, FILM COATED ORAL at 07:47

## 2019-01-06 RX ADMIN — BENZONATATE 100 MG: 100 CAPSULE ORAL at 20:20

## 2019-01-06 RX ADMIN — METOPROLOL TARTRATE 25 MG: 25 TABLET, FILM COATED ORAL at 20:20

## 2019-01-06 RX ADMIN — HEPARIN SODIUM AND DEXTROSE 12 UNITS/KG/HR: 10000; 5 INJECTION INTRAVENOUS at 14:52

## 2019-01-06 RX ADMIN — FUROSEMIDE 40 MG: 10 INJECTION, SOLUTION INTRAMUSCULAR; INTRAVENOUS at 07:47

## 2019-01-06 RX ADMIN — ATORVASTATIN CALCIUM 10 MG: 10 TABLET, FILM COATED ORAL at 17:02

## 2019-01-06 NOTE — PROGRESS NOTES
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 started on dialysis on this admission because of uremia    SUBJECTIVE     Feeling better  More awake  Still not very happy about dialysis  Wants to go home      No nausea no vomiting    CURRENT MEDICATIONS       Current Facility-Administered Medications:     acetaminophen (TYLENOL) tablet 650 mg, 650 mg, Oral, Q6H PRN, Myranda Hilario MD, 650 mg at 01/06/19 0747    aspirin chewable tablet 81 mg, 81 mg, Oral, Daily, Sandra Smith MD, 81 mg at 01/06/19 0825    atorvastatin (LIPITOR) tablet 10 mg, 10 mg, Oral, Daily With Jocelyn Garcia MD, 10 mg at 01/05/19 1721    b complex-vitamin C-folic acid (NEPHROCAPS) capsule 1 capsule, 1 capsule, Oral, Daily With Doylene Bamberger, MD, 1 capsule at 01/05/19 1721    benzonatate (TESSALON PERLES) capsule 100 mg, 100 mg, Oral, TID, Sandra Smith MD, 100 mg at 01/06/19 0825    calcium acetate (PHOSLO) capsule 667 mg, 667 mg, Oral, TID With Meals, Lexie Dow MD, 667 mg at 01/06/19 0747    epoetin deepa (EPOGEN,PROCRIT) injection 2,000 Units, 2,000 Units, Intravenous, Once per day on Mon Wed Fri, 2,000 Units at 01/04/19 1514 **AND** epoetin deepa (EPOGEN,PROCRIT) injection 3,000 Units, 3,000 Units, Intravenous, Once per day on Mon Wed Fri, Lexie Dow MD, 3,000 Units at 01/04/19 1514    furosemide (LASIX) injection 40 mg, 40 mg, Intravenous, BID (diuretic), Sandra Smith MD, 40 mg at 01/06/19 0747    heparin (porcine) subcutaneous injection 5,000 Units, 5,000 Units, Subcutaneous, Q8H [facility], 5,000 Units at 01/06/19 0519 **AND** Platelet count, , , Once, Sandra Smith MD    metoprolol tartrate (LOPRESSOR) tablet 25 mg, 25 mg, Oral, Q12H [facility], Irasema Potter PA-C, 25 mg at 01/06/19 0825    ondansetron (ZOFRAN) injection 4 mg, 4 mg, Intravenous, Q6H PRN, Mary Wick MD, 4 mg at 01/05/19 1220    pantoprazole (PROTONIX) EC tablet 40 mg, 40 mg, Oral, Early Morning, Mary Wick MD, 40 mg at 01/06/19 0519    OBJECTIVE     Current Weight: Weight - Scale: 75 kg (165 lb 5 5 oz)  Vitals:    01/06/19 0700   BP: 120/62   Pulse: 91   Resp: 18   Temp: 98 1 °F (36 7 °C)   SpO2: 99%       Intake/Output Summary (Last 24 hours) at 01/06/19 1056  Last data filed at 01/05/19 1635   Gross per 24 hour   Intake              680 ml   Output             2500 ml   Net            -1820 ml       PHYSICAL EXAMINATION     Physical Exam   Constitutional: He is oriented to person, place, and time  He appears well-developed  No distress  HENT:   Head: Normocephalic  Mouth/Throat: Oropharynx is clear and moist    Eyes: Conjunctivae are normal  No scleral icterus  Neck: Neck supple  No JVD present  Cardiovascular: Normal rate and normal heart sounds  Pulmonary/Chest: Effort normal  He has no wheezes  Abdominal: Soft  There is no tenderness  Musculoskeletal: Normal range of motion  He exhibits no edema  Neurological: He is alert and oriented to person, place, and time  Skin: Skin is warm  No rash noted  Psychiatric: He has a normal mood and affect   His behavior is normal         LAB RESULTS       Results from last 7 days  Lab Units 01/06/19  0510 01/05/19  0535 01/04/19  0450 01/03/19  0437 01/02/19  0432 01/01/19  0511 12/31/18  2127 12/31/18  1259   WBC Thousand/uL 7 97 8 32 8 70 7 59 10 18* 7 36  --  9 46   HEMOGLOBIN g/dL 10 0* 10 2* 10 0* 10 0* 10 7* 9 3*  --  11 2*   HEMATOCRIT % 32 5* 32 6* 30 7* 31 2* 33 2* 29 5*  --  34 5*   PLATELETS Thousands/uL 147* 186 214 232 244 205 236 257   POTASSIUM mmol/L 4 3 4 5 4 6 4 4 5 0 4 3  --  5 1   CHLORIDE mmol/L 95* 96* 92* 93* 94* 96*  --  93*   CO2 mmol/L 32 33* 32 30 25 27  --  27   BUN mg/dL 25 32* 60* 60* 58* 52* --  53*   CREATININE mg/dL 3 25* 3 32* 5 06* 4 86* 4 78* 4 69*  --  4 73*   EGFR ml/min/1 73sq m 18 18 11 11 11 12  --  12   CALCIUM mg/dL 8 1* 8 9 9 5 9 1 9 6 9 2  --  9 8   PHOSPHORUS mg/dL  --   --   --   --  5 1*  --   --   --        RADIOLOGY RESULTS      Results for orders placed during the hospital encounter of 12/31/18   X-ray chest 1 view portable    Narrative CHEST     INDICATION:   chest pain  Cough    COMPARISON:  None    EXAM PERFORMED/VIEWS:  XR CHEST PORTABLE      FINDINGS:    There is a suboptimal inspiration  This may partly explain the prominent appearance of the heart  Cardiomegaly not excluded  There is an ill-defined right lung infiltrate in the infrahilar region and there appear to be trace pleural effusions  Left lung clear  No pneumothorax identified  Osseous structures appear within normal limits for patient age  Impression Right-sided infiltrates suspicious for developing pneumonia as well as trace pleural effusions  Questionable cardiomegaly       Limited inspiration may exaggerate some of the findings  Suggest continued follow-up preferably with deeper inspiratory effort with PA and lateral views  Workstation performed: [id number]       No results found for this or any previous visit  No results found for this or any previous visit  No results found for this or any previous visit  No results found for this or any previous visit  No results found for this or any previous visit  PLAN / RECOMMENDATIONS      ESRD:  On hemodialysis to dialysis catheter  Will get dialyzed tomorrow    Will need outpatient dialysis placement    Anemia:  On Procrit which I will continue    Cardiomyopathy:  Much better with dialysis    Coronary artery disease:  Patient continue complain of chest pain when will require cardiac catheterization before discharge    Will follow    Mar Weniberg MD  Nephrology  1/6/2019        Portions of the record may have been created with voice recognition software  Occasional wrong word or "sound a like" substitutions may have occurred due to the inherent limitations of voice recognition software  Read the chart carefully and recognize, using context, where substitutions have occurred

## 2019-01-06 NOTE — PROGRESS NOTES
Was paged by nursing staff regarding patient with new onset chest pain  Was supposed to have cardiac catheterization but this was postponed due to volume overload per review of the notes  New troponin elevated at 0 34  Pt no longer having chest pain per nursing  Discussed with on call cardiology, will obtain repeat troponin, if elevated will need to start heparin gtt at that time  Signed off to on call night hospitalist  No significant changes noted on repeat EKG  Continue to monitor closely      Alie Morrissey PA-C

## 2019-01-06 NOTE — PROGRESS NOTES
Progress Note Kajal Coats 1945, 72 y o  male MRN: [id number]    Unit/Bed#: -01 Encounter: [id number]    Primary Care Provider: No primary care provider on file  Date and time admitted to hospital: 2018 12:47 PM        Acute on chronic congestive heart failure ([facility] [address] )   Assessment & Plan    With CKD-started HD  Cardiology following  Nephrology following     MI, acute, non ST segment elevation [facility])   Assessment & Plan    Cardiology following  with CHF and ckd  For HD  Continue current meds  Cardiac cath pending this week  Heparin gtt     Chest pain   Assessment & Plan    Recurrent last night  Troponin higher  Start heparin gtt  Cardiology aware  Cardiac cath pending         VTE Pharmacologic Prophylaxis:   Pharmacologic: Heparin Drip  Mechanical VTE Prophylaxis in Place: Yes    Patient Centered Rounds: I have performed bedside rounds with nursing staff today  Discussions with Specialists or Other Care Team Provider:     Education and Discussions with Family / Patient:     Time Spent for Care: 30 minutes  More than 50% of total time spent on counseling and coordination of care as described above  Current Length of Stay: 6 day(s)    Current Patient Status: Inpatient   Certification Statement: The patient will continue to require additional inpatient hospital stay due to nstemi    Discharge Plan: pending PT consult    Code Status: Level 1 - Full Code      Subjective:   No further chest pain    Objective:     Vitals:   Temp (24hrs), Av 1 °F (36 7 °C), Min:97 8 °F (36 6 °C), Max:98 6 °F (37 °C)    Temp:  [97 8 °F (36 6 °C)-98 6 °F (37 °C)] 98 6 °F (37 °C)  HR:  [78-98] 78  Resp:  [18] 18  BP: (120-155)/(62-80) 136/62  SpO2:  [99 %-100 %] 100 %  Body mass index is 27 51 kg/m²  Input and Output Summary (last 24 hours):        Intake/Output Summary (Last 24 hours) at 19 1434  Last data filed at 19 1300   Gross per 24 hour   Intake              340 ml   Output 250 ml   Net               90 ml       Physical Exam:     Physical Exam   Constitutional: He appears well-developed and well-nourished  HENT:   Head: Normocephalic  Eyes: Pupils are equal, round, and reactive to light  Cardiovascular: Normal rate and regular rhythm  Pulmonary/Chest: Effort normal and breath sounds normal    Abdominal: Soft  Bowel sounds are normal    Musculoskeletal: He exhibits no edema  Additional Data:     Labs:      Results from last 7 days  Lab Units 01/06/19  0510   WBC Thousand/uL 7 97   HEMOGLOBIN g/dL 10 0*   HEMATOCRIT % 32 5*   PLATELETS Thousands/uL 147*   NEUTROS PCT % 73   LYMPHS PCT % 16   MONOS PCT % 9   EOS PCT % 1       Results from last 7 days  Lab Units 01/06/19  0510   SODIUM mmol/L 132*   POTASSIUM mmol/L 4 3   CHLORIDE mmol/L 95*   CO2 mmol/L 32   BUN mg/dL 25   CREATININE mg/dL 3 25*   ANION GAP mmol/L 5   CALCIUM mg/dL 8 1*   ALBUMIN g/dL 2 4*   TOTAL BILIRUBIN mg/dL 0 40   ALK PHOS U/L 61   ALT U/L 20   AST U/L 14   GLUCOSE RANDOM mg/dL 385*       Results from last 7 days  Lab Units 12/31/18  1506   INR  1 22*       Results from last 7 days  Lab Units 12/31/18  2110 12/31/18  2020   POC GLUCOSE mg/dl 181* 174*       Results from last 7 days  Lab Units 01/02/19  0432   HEMOGLOBIN A1C % 8 2*       Results from last 7 days  Lab Units 01/01/19  1053   PROCALCITONIN ng/ml 0 07           * I Have Reviewed All Lab Data Listed Above  * Additional Pertinent Lab Tests Reviewed:  All Labs Within Last 24 Hours Reviewed    Imaging:    Imaging Reports Reviewed Today Include:   Imaging Personally Reviewed by Myself Includes:      Recent Cultures (last 7 days):           Last 24 Hours Medication List:     Current Facility-Administered Medications:  acetaminophen 650 mg Oral Q6H PRN Mana Love MD   aspirin 81 mg Oral Daily Haley Browning MD   atorvastatin 10 mg Oral Daily With Saul Lowry MD   b complex-vitamin C-folic acid 1 capsule Oral Daily With Freddi Koyanagi, MD   benzonatate 100 mg Oral TID Carla Linares MD   calcium acetate 667 mg Oral TID With Meals Gloria Rice MD   epoetin deepa 2,000 Units Intravenous Once per day on Mon Wed Fri Gloria Rice MD   And       epoetin deepa 3,000 Units Intravenous Once per day on Mon Wed Fri Gloria Rice MD   furosemide 40 mg Intravenous BID (diuretic) Carla Linares MD   heparin  Intravenous Once Gerry Vargas MD   heparin (porcine) 5,000 Units Subcutaneous Q8H [facility] Carla Linares MD   metoprolol tartrate 25 mg Oral Q12H [facility] Laura Rizvi PA-C   ondansetron 4 mg Intravenous Q6H PRN Carla Linares MD   pantoprazole 40 mg Oral Early Morning Carla Linares MD        Today, Patient Was Seen By: Gerry Vargas MD    ** Please Note: Dictation voice to text software may have been used in the creation of this document   **

## 2019-01-06 NOTE — NURSING NOTE
Patient alert and oriented x 3  Able to make needs known  Patient is c/o pain all over, generalized discomfort  He verbalized that he can not get comfortable and it is difficult to sleep at night  Patient repositioned much time spent with patient throughout the day  Patient refusing food and fluids verbalized that he is tired and does not have an appetite  Encouragement given and a variety of fluids offered  Currently resting in bed in no acute distress  Call bell within reach  Dr Shelby Nicole made aware

## 2019-01-06 NOTE — NURSING NOTE
Patient c/o chest pain 10/10  EKG done, VS, troponin, nitro x 2 and Dr Carney Show and Dr Jacobo Given made aware  Patient is on a heparin gtt  Pain verbalized pain is now a 5 after the nitro  Will continue to monitor  Call bell within reach  Wife at bedside    Patient scheduled for cardiac cath in the am

## 2019-01-06 NOTE — ASSESSMENT & PLAN NOTE
Cardiology following  with CHF and ckd  For HD  Continue current meds  Cardiac cath pending this week  Heparin gtt

## 2019-01-07 ENCOUNTER — APPOINTMENT (INPATIENT)
Dept: INTERVENTIONAL RADIOLOGY/VASCULAR | Facility: HOSPITAL | Age: 74
DRG: 673 | End: 2019-01-07
Payer: MEDICARE

## 2019-01-07 ENCOUNTER — APPOINTMENT (INPATIENT)
Dept: DIALYSIS | Facility: HOSPITAL | Age: 74
DRG: 673 | End: 2019-01-07
Attending: INTERNAL MEDICINE
Payer: MEDICARE

## 2019-01-07 LAB
ANION GAP SERPL CALCULATED.3IONS-SCNC: 3 MMOL/L (ref 4–13)
APTT PPP: 145 SECONDS (ref 26–38)
APTT PPP: 47 SECONDS (ref 26–38)
APTT PPP: 82 SECONDS (ref 26–38)
ATRIAL RATE: 87 BPM
BASOPHILS # BLD AUTO: 0.05 THOUSANDS/ΜL (ref 0–0.1)
BASOPHILS NFR BLD AUTO: 1 % (ref 0–1)
BUN SERPL-MCNC: 38 MG/DL (ref 5–25)
CALCIUM SERPL-MCNC: 9.3 MG/DL (ref 8.3–10.1)
CHLORIDE SERPL-SCNC: 91 MMOL/L (ref 100–108)
CO2 SERPL-SCNC: 36 MMOL/L (ref 21–32)
CREAT SERPL-MCNC: 4.21 MG/DL (ref 0.6–1.3)
EOSINOPHIL # BLD AUTO: 0.11 THOUSAND/ΜL (ref 0–0.61)
EOSINOPHIL NFR BLD AUTO: 1 % (ref 0–6)
ERYTHROCYTE [DISTWIDTH] IN BLOOD BY AUTOMATED COUNT: 16.2 % (ref 11.6–15.1)
GFR SERPL CREATININE-BSD FRML MDRD: 13 ML/MIN/1.73SQ M
GLUCOSE SERPL-MCNC: 284 MG/DL (ref 65–140)
HCT VFR BLD AUTO: 30.6 % (ref 36.5–49.3)
HGB BLD-MCNC: 9.6 G/DL (ref 12–17)
IMM GRANULOCYTES # BLD AUTO: 0.04 THOUSAND/UL (ref 0–0.2)
IMM GRANULOCYTES NFR BLD AUTO: 0 % (ref 0–2)
KCT BLD-ACNC: 138 SEC (ref 89–137)
LYMPHOCYTES # BLD AUTO: 1.79 THOUSANDS/ΜL (ref 0.6–4.47)
LYMPHOCYTES NFR BLD AUTO: 17 % (ref 14–44)
MCH RBC QN AUTO: 25.8 PG (ref 26.8–34.3)
MCHC RBC AUTO-ENTMCNC: 31.4 G/DL (ref 31.4–37.4)
MCV RBC AUTO: 82 FL (ref 82–98)
MONOCYTES # BLD AUTO: 0.87 THOUSAND/ΜL (ref 0.17–1.22)
MONOCYTES NFR BLD AUTO: 9 % (ref 4–12)
NEUTROPHILS # BLD AUTO: 7.41 THOUSANDS/ΜL (ref 1.85–7.62)
NEUTS SEG NFR BLD AUTO: 72 % (ref 43–75)
NRBC BLD AUTO-RTO: 0 /100 WBCS
P AXIS: 69 DEGREES
PLATELET # BLD AUTO: 149 THOUSANDS/UL (ref 149–390)
PMV BLD AUTO: 11.3 FL (ref 8.9–12.7)
POTASSIUM SERPL-SCNC: 4.6 MMOL/L (ref 3.5–5.3)
PR INTERVAL: 260 MS
QRS AXIS: -18 DEGREES
QRSD INTERVAL: 96 MS
QT INTERVAL: 382 MS
QTC INTERVAL: 459 MS
RBC # BLD AUTO: 3.72 MILLION/UL (ref 3.88–5.62)
SODIUM SERPL-SCNC: 130 MMOL/L (ref 136–145)
SPECIMEN SOURCE: ABNORMAL
T WAVE AXIS: 127 DEGREES
TROPONIN I SERPL-MCNC: 0.28 NG/ML
VENTRICULAR RATE: 87 BPM
WBC # BLD AUTO: 10.27 THOUSAND/UL (ref 4.31–10.16)

## 2019-01-07 PROCEDURE — 99152 MOD SED SAME PHYS/QHP 5/>YRS: CPT | Performed by: INTERNAL MEDICINE

## 2019-01-07 PROCEDURE — 4A023N7 MEASUREMENT OF CARDIAC SAMPLING AND PRESSURE, LEFT HEART, PERCUTANEOUS APPROACH: ICD-10-PCS | Performed by: INTERNAL MEDICINE

## 2019-01-07 PROCEDURE — 85730 THROMBOPLASTIN TIME PARTIAL: CPT | Performed by: INTERNAL MEDICINE

## 2019-01-07 PROCEDURE — 93458 L HRT ARTERY/VENTRICLE ANGIO: CPT | Performed by: INTERNAL MEDICINE

## 2019-01-07 PROCEDURE — 85025 COMPLETE CBC W/AUTO DIFF WBC: CPT | Performed by: INTERNAL MEDICINE

## 2019-01-07 PROCEDURE — C1769 GUIDE WIRE: HCPCS | Performed by: PHYSICIAN ASSISTANT

## 2019-01-07 PROCEDURE — B215YZZ FLUOROSCOPY OF LEFT HEART USING OTHER CONTRAST: ICD-10-PCS | Performed by: INTERNAL MEDICINE

## 2019-01-07 PROCEDURE — 90935 HEMODIALYSIS ONE EVALUATION: CPT | Performed by: INTERNAL MEDICINE

## 2019-01-07 PROCEDURE — 93458 L HRT ARTERY/VENTRICLE ANGIO: CPT | Performed by: PHYSICIAN ASSISTANT

## 2019-01-07 PROCEDURE — B211YZZ FLUOROSCOPY OF MULTIPLE CORONARY ARTERIES USING OTHER CONTRAST: ICD-10-PCS | Performed by: INTERNAL MEDICINE

## 2019-01-07 PROCEDURE — 99239 HOSP IP/OBS DSCHRG MGMT >30: CPT | Performed by: INTERNAL MEDICINE

## 2019-01-07 PROCEDURE — 93005 ELECTROCARDIOGRAM TRACING: CPT

## 2019-01-07 PROCEDURE — 80048 BASIC METABOLIC PNL TOTAL CA: CPT | Performed by: INTERNAL MEDICINE

## 2019-01-07 PROCEDURE — 99153 MOD SED SAME PHYS/QHP EA: CPT | Performed by: PHYSICIAN ASSISTANT

## 2019-01-07 PROCEDURE — 85347 COAGULATION TIME ACTIVATED: CPT

## 2019-01-07 PROCEDURE — C1894 INTRO/SHEATH, NON-LASER: HCPCS | Performed by: PHYSICIAN ASSISTANT

## 2019-01-07 PROCEDURE — 99152 MOD SED SAME PHYS/QHP 5/>YRS: CPT | Performed by: PHYSICIAN ASSISTANT

## 2019-01-07 PROCEDURE — 85730 THROMBOPLASTIN TIME PARTIAL: CPT | Performed by: GENERAL PRACTICE

## 2019-01-07 PROCEDURE — 93010 ELECTROCARDIOGRAM REPORT: CPT | Performed by: INTERNAL MEDICINE

## 2019-01-07 RX ORDER — FUROSEMIDE 10 MG/ML
40 INJECTION INTRAMUSCULAR; INTRAVENOUS
Status: CANCELLED | OUTPATIENT
Start: 2019-01-07

## 2019-01-07 RX ORDER — CHOLECALCIFEROL (VITAMIN D3) 10 MCG
1 TABLET ORAL
Status: CANCELLED | OUTPATIENT
Start: 2019-01-07

## 2019-01-07 RX ORDER — PANTOPRAZOLE SODIUM 40 MG/1
40 TABLET, DELAYED RELEASE ORAL
Status: CANCELLED | OUTPATIENT
Start: 2019-01-08

## 2019-01-07 RX ORDER — MIDAZOLAM HYDROCHLORIDE 1 MG/ML
INJECTION INTRAMUSCULAR; INTRAVENOUS CODE/TRAUMA/SEDATION MEDICATION
Status: COMPLETED | OUTPATIENT
Start: 2019-01-07 | End: 2019-01-07

## 2019-01-07 RX ORDER — ATORVASTATIN CALCIUM 10 MG/1
10 TABLET, FILM COATED ORAL
Status: CANCELLED | OUTPATIENT
Start: 2019-01-07

## 2019-01-07 RX ORDER — HEPARIN SODIUM 1000 [USP'U]/ML
2000 INJECTION, SOLUTION INTRAVENOUS; SUBCUTANEOUS AS NEEDED
Status: CANCELLED | OUTPATIENT
Start: 2019-01-07

## 2019-01-07 RX ORDER — FENTANYL CITRATE 50 UG/ML
INJECTION, SOLUTION INTRAMUSCULAR; INTRAVENOUS CODE/TRAUMA/SEDATION MEDICATION
Status: COMPLETED | OUTPATIENT
Start: 2019-01-07 | End: 2019-01-07

## 2019-01-07 RX ORDER — ACETAMINOPHEN 325 MG/1
650 TABLET ORAL EVERY 6 HOURS PRN
Status: CANCELLED | OUTPATIENT
Start: 2019-01-07

## 2019-01-07 RX ORDER — LANOLIN ALCOHOL/MO/W.PET/CERES
3 CREAM (GRAM) TOPICAL
Status: CANCELLED | OUTPATIENT
Start: 2019-01-07

## 2019-01-07 RX ORDER — HEPARIN SODIUM 1000 [USP'U]/ML
4000 INJECTION, SOLUTION INTRAVENOUS; SUBCUTANEOUS AS NEEDED
Status: CANCELLED | OUTPATIENT
Start: 2019-01-07

## 2019-01-07 RX ORDER — ASPIRIN 81 MG/1
81 TABLET, CHEWABLE ORAL DAILY
Status: CANCELLED | OUTPATIENT
Start: 2019-01-08

## 2019-01-07 RX ORDER — BENZONATATE 100 MG/1
100 CAPSULE ORAL 3 TIMES DAILY
Status: CANCELLED | OUTPATIENT
Start: 2019-01-07

## 2019-01-07 RX ORDER — ONDANSETRON 2 MG/ML
4 INJECTION INTRAMUSCULAR; INTRAVENOUS EVERY 6 HOURS PRN
Status: CANCELLED | OUTPATIENT
Start: 2019-01-07

## 2019-01-07 RX ORDER — HEPARIN SODIUM 10000 [USP'U]/100ML
3-20 INJECTION, SOLUTION INTRAVENOUS
Status: CANCELLED | OUTPATIENT
Start: 2019-01-07

## 2019-01-07 RX ORDER — LIDOCAINE HYDROCHLORIDE 10 MG/ML
INJECTION, SOLUTION INFILTRATION; PERINEURAL CODE/TRAUMA/SEDATION MEDICATION
Status: COMPLETED | OUTPATIENT
Start: 2019-01-07 | End: 2019-01-07

## 2019-01-07 RX ADMIN — MELATONIN 3 MG: 3 TAB ORAL at 20:50

## 2019-01-07 RX ADMIN — BENZONATATE 100 MG: 100 CAPSULE ORAL at 17:23

## 2019-01-07 RX ADMIN — IODIXANOL 40 ML: 320 INJECTION, SOLUTION INTRAVASCULAR at 08:47

## 2019-01-07 RX ADMIN — MIDAZOLAM HYDROCHLORIDE 1 MG: 1 INJECTION, SOLUTION INTRAMUSCULAR; INTRAVENOUS at 08:25

## 2019-01-07 RX ADMIN — ACETAMINOPHEN 650 MG: 325 TABLET, FILM COATED ORAL at 14:06

## 2019-01-07 RX ADMIN — METOPROLOL TARTRATE 25 MG: 25 TABLET, FILM COATED ORAL at 14:05

## 2019-01-07 RX ADMIN — PANTOPRAZOLE SODIUM 40 MG: 40 TABLET, DELAYED RELEASE ORAL at 05:42

## 2019-01-07 RX ADMIN — ASPIRIN 81 MG 81 MG: 81 TABLET ORAL at 14:05

## 2019-01-07 RX ADMIN — FENTANYL CITRATE 25 MCG: 50 INJECTION, SOLUTION INTRAMUSCULAR; INTRAVENOUS at 08:25

## 2019-01-07 RX ADMIN — BENZONATATE 100 MG: 100 CAPSULE ORAL at 20:49

## 2019-01-07 RX ADMIN — EPOETIN ALFA 3000 UNITS: 3000 SOLUTION INTRAVENOUS; SUBCUTANEOUS at 12:14

## 2019-01-07 RX ADMIN — CALCIUM ACETATE 667 MG: 667 CAPSULE ORAL at 17:22

## 2019-01-07 RX ADMIN — METOPROLOL TARTRATE 25 MG: 25 TABLET, FILM COATED ORAL at 20:49

## 2019-01-07 RX ADMIN — HEPARIN SODIUM 2000 UNITS: 1000 INJECTION, SOLUTION INTRAVENOUS; SUBCUTANEOUS at 22:06

## 2019-01-07 RX ADMIN — EPOETIN ALFA 2000 UNITS: 2000 SOLUTION INTRAVENOUS; SUBCUTANEOUS at 12:15

## 2019-01-07 RX ADMIN — LIDOCAINE HYDROCHLORIDE 9 ML: 10 INJECTION, SOLUTION INFILTRATION; PERINEURAL at 08:30

## 2019-01-07 RX ADMIN — FUROSEMIDE 40 MG: 10 INJECTION, SOLUTION INTRAMUSCULAR; INTRAVENOUS at 17:23

## 2019-01-07 RX ADMIN — ATORVASTATIN CALCIUM 10 MG: 10 TABLET, FILM COATED ORAL at 17:22

## 2019-01-07 RX ADMIN — Medication 1 CAPSULE: at 17:22

## 2019-01-07 NOTE — PROGRESS NOTES
HEMODIALYSIS ROUNDING NOTE    Patient: Eugene Baez               Sex: male          DOA: 12/31/2018 12:47 PM   YOB: 1945        Age:  72 y o         LOS:  LOS: 7 days   1/7/2019    REASON FOR THE CONSULTATION:  Further management of NATALIIA  HPI     This is a 72 y o  male admitted for Shortness of breath     SUBJECTIVE     - Patient was seen during hemodialysis today  Patient denies nausea / vomiting / headache / dizziness / SOB / chest pain during hemodialysis  - Reviewed last 24 hrs events     Patient had underwent cardiac catheterization this morning and found to have multivessel disease and awaiting possible transfer to [facility] for possible CABG      CURRENT MEDICATIONS       Current Facility-Administered Medications:     acetaminophen (TYLENOL) tablet 650 mg, 650 mg, Oral, Q6H PRN, Bryon Hilario MD, 650 mg at 01/07/19 1406    aspirin chewable tablet 81 mg, 81 mg, Oral, Daily, Maxim Roberts MD, 81 mg at 01/07/19 1405    atorvastatin (LIPITOR) tablet 10 mg, 10 mg, Oral, Daily With Claudia Camilo MD, 10 mg at 01/06/19 1702    b complex-vitamin C-folic acid (NEPHROCAPS) capsule 1 capsule, 1 capsule, Oral, Daily With Ace Roy MD, 1 capsule at 01/06/19 1702    benzonatate (TESSALON PERLES) capsule 100 mg, 100 mg, Oral, TID, Maxim Roberts MD, 100 mg at 01/06/19 2020    calcium acetate (PHOSLO) capsule 667 mg, 667 mg, Oral, TID With Meals, Eron Mcgovern MD, 667 mg at 01/06/19 1703    epoetin deepa (EPOGEN,PROCRIT) injection 2,000 Units, 2,000 Units, Intravenous, Once per day on Mon Wed Fri, 2,000 Units at 01/07/19 1215 **AND** epoetin deepa (EPOGEN,PROCRIT) injection 3,000 Units, 3,000 Units, Intravenous, Once per day on Mon Wed Fri, Eron Mcgovern MD, 3,000 Units at 01/07/19 1214    furosemide (LASIX) injection 40 mg, 40 mg, Intravenous, BID (diuretic), Maxim Roberts MD, 40 mg at 01/06/19 1702    heparin (porcine) 25,000 units in 250 mL infusion (premix), 3-20 Units/kg/hr (Order-Specific), Intravenous, Titrated, Bruce Izquierdo MD, Stopped at 01/07/19 0745    heparin (porcine) injection 2,000 Units, 2,000 Units, Intravenous, PRN, Bruce Izquierdo MD    heparin (porcine) injection 4,000 Units, 4,000 Units, Intravenous, PRN, Bruce Izquierdo MD    melatonin tablet 3 mg, 3 mg, Oral, HS, Hildred Quiet MD Yanelis, 3 mg at 01/06/19 2212    metoprolol tartrate (LOPRESSOR) tablet 25 mg, 25 mg, Oral, Q12H [address], Laura Rizvi PA-C, 25 mg at 01/07/19 1405    ondansetron (ZOFRAN) injection 4 mg, 4 mg, Intravenous, Q6H PRN, Una Velazquez MD, 4 mg at 01/05/19 1220    pantoprazole (PROTONIX) EC tablet 40 mg, 40 mg, Oral, Early Morning, Una Velazquez MD, 40 mg at 01/07/19 0542    OBJECTIVE     Current Weight: Weight - Scale: 75 kg (165 lb 5 5 oz)  Vitals:    01/07/19 1500   BP: 116/58   Pulse: 79   Resp: 18   Temp: 98 6 °F (37 °C)   SpO2: 100%     Body mass index is 27 51 kg/m²  Intake/Output Summary (Last 24 hours) at 01/07/19 1538  Last data filed at 01/07/19 1310   Gross per 24 hour   Intake           558 83 ml   Output             2346 ml   Net         -1787 17 ml       PHYSICAL EXAMINATION     Physical Exam   Constitutional: No distress  HENT:   Head: Normocephalic and atraumatic  Eyes: No scleral icterus  Neck: Neck supple  No JVD present  Cardiovascular: Normal rate, S1 normal and S2 normal     Pulmonary/Chest: Effort normal  No accessory muscle usage  No respiratory distress  He has no wheezes  Abdominal: Soft  He exhibits no distension  Musculoskeletal:        Right hand: He exhibits no laceration and no swelling  Left hand: He exhibits no laceration and no swelling  Neurological: He is alert  He is not disoriented  Skin: Skin is warm  No cyanosis  Psychiatric: He is not combative  He does not exhibit a depressed mood   He expresses no suicidal ideation  LAB RESULTS       Results from last 7 days  Lab Units 01/07/19  0624 01/06/19  1502 01/06/19  0510 01/05/19  0535 01/04/19  0450 01/03/19  0437 01/02/19  0432 01/01/19  0511   WBC Thousand/uL 10 27* 8 81 7 97 8 32 8 70 7 59 10 18* 7 36   HEMOGLOBIN g/dL 9 6* 10 6* 10 0* 10 2* 10 0* 10 0* 10 7* 9 3*   HEMATOCRIT % 30 6* 34 0* 32 5* 32 6* 30 7* 31 2* 33 2* 29 5*   PLATELETS Thousands/uL 149 148* 147* 186 214 232 244 205   POTASSIUM mmol/L 4 6  --  4 3 4 5 4 6 4 4 5 0 4 3   CHLORIDE mmol/L 91*  --  95* 96* 92* 93* 94* 96*   CO2 mmol/L 36*  --  32 33* 32 30 25 27   BUN mg/dL 38*  --  25 32* 60* 60* 58* 52*   CREATININE mg/dL 4 21*  --  3 25* 3 32* 5 06* 4 86* 4 78* 4 69*   EGFR ml/min/1 73sq m 13  --  18 18 11 11 11 12   CALCIUM mg/dL 9 3  --  8 1* 8 9 9 5 9 1 9 6 9 2   PHOSPHORUS mg/dL  --   --   --   --   --   --  5 1*  --        RADIOLOGY RESULTS     Results for orders placed during the hospital encounter of 12/31/18   X-ray chest 1 view portable    Narrative CHEST     INDICATION:   chest pain  Cough    COMPARISON:  None    EXAM PERFORMED/VIEWS:  XR CHEST PORTABLE      FINDINGS:    There is a suboptimal inspiration  This may partly explain the prominent appearance of the heart  Cardiomegaly not excluded  There is an ill-defined right lung infiltrate in the infrahilar region and there appear to be trace pleural effusions  Left lung clear  No pneumothorax identified  Osseous structures appear within normal limits for patient age  Impression Right-sided infiltrates suspicious for developing pneumonia as well as trace pleural effusions  Questionable cardiomegaly       Limited inspiration may exaggerate some of the findings  Suggest continued follow-up preferably with deeper inspiratory effort with PA and lateral views  Workstation performed: [id number]         PLAN / RECOMMENDATIONS     1  NATALIIA    Present on admission, upon review of old medical record patient's baseline creatinine level is unknown but found to have worsening renal function with uremia and was started on dialysis earlier during the hospital stay  Patient was also found to have nephrotic range proteinuria during the workup which is suggesting that patient may have underlying chronic kidney disease  Will plan hemodialysis today and continue dialysis on Monday Wednesday Friday dialysis schedule until full renal recovery  At 10:43 AM on 1/7/2019, I saw and examined patient during hemodialysis treatment  The patient was receiving hemodialysis for treatment of acute kidney injury  I have also reviewed vital signs, intake and output, lab results and recent events, and agreed with today's dialysis order  Access: No issue    Disposition:  Pending transferred to [facility] for possible CABG  Rickey Maxwell MD  Nephrology  1/7/2019        Portions of the record may have been created with voice recognition software  Occasional wrong word or "sound a like" substitutions may have occurred due to the inherent limitations of voice recognition software  Read the chart carefully and recognize, using context, where substitutions have occurred

## 2019-01-07 NOTE — DISCHARGE SUMMARY
Discharge Summary - [address] Internal Medicine    Patient Information: Michaela Bennett 72 y o  male MRN: [id number]  Unit/Bed#: -01 Encounter: [id number]    Discharging Physician / Practitioner: Arun Robles MD  PCP: No primary care provider on file  Admission Date: 12/31/2018  Discharge Date: 01/07/19    Disposition:     Other: [facility]    Reason for Admission:  Shortness Of breath    Discharge Diagnoses:     Principal Problem:    Shortness of breath  Active Problems:    Chest pain    NATALIIA (acute kidney injury) ([address] )    CKD (chronic kidney disease)    Cardiomyopathy (HCC)    Hyponatremia    MI, acute, non ST segment elevation (HCC)    Acute on chronic congestive heart failure (HCC)  Resolved Problems:    * No resolved hospital problems  *      Consultations During Hospital Stay:  · Cardiology  · IR  · Nephrology    Procedures Performed:     · Dialysis catheter placement 1/4/19    Significant Findings / Test Results:   Echocardiogram ejection fraction 35% with severe diffuse hypokinesis  Right ventricle dilated  Right mitral valve moderate regurgitation  Incidental Findings:   · None    Test Results Pending at Discharge (will require follow up): · None     Outpatient Tests Requested:  · None    Complications:  None    Hospital Course:     Michaela Bennett is a 72 y o  male patient with past medical history of hypertension, diabetes, hyperlipidemia, CKD stage 5 who originally presented to the hospital on 12/31/2018 due to worsening shortness of breath, lower extremity edema and orthopnea  Patient had a recent hospital admission to [facility] for Congestive heart failure exacerbation and was discharged home on torsemide  But as he continued to have worsening shortness of breath he presented to ER for further evaluation  He had refused dialysis in the past   Mentation his serum creatinine was found to be significantly elevated as well as his troponins    EKG demonstrated normal sinus rhythm with nonspecific T-wave changes  Patient was initially diuresed with IV Lasix, due to worsening creatinine and kidney disease nephrology recommended dialysis to which patient finally agreed after multiple discussions  IR was consulted, had dialysis catheter placed and patient did undergo few systems of hemodialysis  His symptoms shortness of breath and orthopnea improved  He was evaluated by Cardiology, later underwent cardiac catheterization which demonstrated multivessel disease  Cardiologist at [facility] discussed the case with Meena Lui, [address] [facility] surgeon at [facility] who recommended to transfer the patient for possible CABG  I did discuss the case with slim physician [facility] call [facility]  As per Cardiology patient to be transferred on heparin drip  He should have Cardiology Nephrology, CT surgery consult  Condition at Discharge: fair     Discharge Day Visit / Exam:     Subjective:  Patient seen and examined after dialysis  Currently denies any symptoms at this time  No cough or shortness of breath or orthopnea  Vitals: Blood Pressure: 141/75 (01/07/19 1400)  Pulse: 98 (01/07/19 1400)  Temperature: 98 4 °F (36 9 °C) (01/07/19 0545)  Temp Source: Oral (01/07/19 0545)  Respirations: 18 (01/07/19 1400)  Height: 5' 5" (165 1 cm) (12/31/18 1900)  Weight - Scale: 75 kg (165 lb 5 5 oz) (01/06/19 0600)  SpO2: 98 % (01/07/19 1400)  Exam:   Physical Exam   Constitutional: He is oriented to person, place, and time  He appears well-developed and well-nourished  No distress  HENT:   Head: Normocephalic and atraumatic  Eyes: Pupils are equal, round, and reactive to light  EOM are normal    Neck: Normal range of motion  Neck supple  Cardiovascular: Normal rate and regular rhythm  Pulmonary/Chest: Effort normal  He has rales  Abdominal: Soft  Bowel sounds are normal    Musculoskeletal: He exhibits edema  Neurological: He is alert and oriented to person, place, and time     Skin: Skin is warm and dry  Discussion with Family:  Wife and son at the bedside    Discharge instructions/Information to patient and family:   See after visit summary for information provided to patient and family  Provisions for Follow-Up Care:  See after visit summary for information related to follow-up care and any pertinent home health orders  Planned Readmission: none     Discharge Statement:  I spent 35 minutes discharging the patient  This time was spent on the day of discharge  I had direct contact with the patient on the day of discharge  Greater than 50% of the total time was spent examining patient, answering all patient questions, arranging and discussing plan of care with patient as well as directly providing post-discharge instructions  Additional time then spent on discharge activities  Discharge Medications:  See after visit summary for reconciled discharge medications provided to patient and family        ** Please Note: This note has been constructed using a voice recognition system **

## 2019-01-07 NOTE — PLAN OF CARE
CARDIOVASCULAR - ADULT     Maintains optimal cardiac output and hemodynamic stability Adequate for Discharge     Absence of cardiac dysrhythmias or at baseline rhythm Adequate for Discharge        DISCHARGE PLANNING     Discharge to home or other facility with appropriate resources Adequate for Discharge        DISCHARGE PLANNING - CARE MANAGEMENT     Discharge to post-acute care or home with appropriate resources Adequate for Discharge        INFECTION - ADULT     Absence or prevention of progression during hospitalization Adequate for Discharge        Knowledge Deficit     Patient/family/caregiver demonstrates understanding of disease process, treatment plan, medications, and discharge instructions Adequate for Discharge        METABOLIC, FLUID AND ELECTROLYTES - ADULT     Electrolytes maintained within normal limits Adequate for Discharge     Fluid balance maintained Adequate for Discharge     Glucose maintained within target range Adequate for Discharge        Nutrition/Hydration-ADULT     Nutrient/Hydration intake appropriate for improving, restoring or maintaining nutritional needs Adequate for Discharge        PAIN - ADULT     Verbalizes/displays adequate comfort level or baseline comfort level Adequate for Discharge        Potential for Falls     Patient will remain free of falls Adequate for Discharge        Prexisting or High Potential for Compromised Skin Integrity     Skin integrity is maintained or improved Adequate for Discharge        RESPIRATORY - ADULT     Achieves optimal ventilation and oxygenation Adequate for Discharge        SAFETY ADULT     Maintain or return to baseline ADL function Adequate for Discharge     Maintain or return mobility status to optimal level Adequate for Discharge

## 2019-01-07 NOTE — SOCIAL WORK
LELO received a vm from Valentine Méndez with [facility] stating that Francis Chapa is coordinator for pt  Her phone number is [phone], option 1 and [phone]  Teresa's vm stated that Hep B is only record that is needed  CM faxed Hep B to [phone]

## 2019-01-07 NOTE — HEMODIALYSIS
Pt stable on dialysis post cath lab procedure  Pt received 3 1/2 hr tx  Low BP last hour of tx-UF turned off    1846 ml removed

## 2019-01-08 ENCOUNTER — DOCUMENTATION (OUTPATIENT)
Dept: MEDSURG UNIT | Facility: HOSPITAL | Age: 74
End: 2019-01-08

## 2019-01-08 ENCOUNTER — HOSPITAL ENCOUNTER (INPATIENT)
Facility: HOSPITAL | Age: 74
LOS: 8 days | Discharge: HOME HEALTH | DRG: 280 | End: 2019-01-16
Attending: INTERNAL MEDICINE | Admitting: INTERNAL MEDICINE
Payer: MEDICARE

## 2019-01-08 VITALS
OXYGEN SATURATION: 92 % | SYSTOLIC BLOOD PRESSURE: 129 MMHG | RESPIRATION RATE: 18 BRPM | BODY MASS INDEX: 24.61 KG/M2 | WEIGHT: 147.71 LBS | HEART RATE: 87 BPM | TEMPERATURE: 97.7 F | DIASTOLIC BLOOD PRESSURE: 62 MMHG | HEIGHT: 65 IN

## 2019-01-08 DIAGNOSIS — R13.10 DYSPHAGIA: ICD-10-CM

## 2019-01-08 DIAGNOSIS — I25.10 MULTI-VESSEL CORONARY ARTERY STENOSIS: ICD-10-CM

## 2019-01-08 DIAGNOSIS — Z99.2 END STAGE RENAL DISEASE ON DIALYSIS (HCC): ICD-10-CM

## 2019-01-08 DIAGNOSIS — I42.9 CARDIOMYOPATHY, UNSPECIFIED TYPE (HCC): ICD-10-CM

## 2019-01-08 DIAGNOSIS — Z79.4 TYPE 2 DIABETES MELLITUS WITH DIABETIC NEPHROPATHY, WITH LONG-TERM CURRENT USE OF INSULIN (HCC): ICD-10-CM

## 2019-01-08 DIAGNOSIS — I21.4 MI, ACUTE, NON ST SEGMENT ELEVATION (HCC): ICD-10-CM

## 2019-01-08 DIAGNOSIS — E43 SEVERE PROTEIN-CALORIE MALNUTRITION (HCC): ICD-10-CM

## 2019-01-08 DIAGNOSIS — E11.21 TYPE 2 DIABETES MELLITUS WITH DIABETIC NEPHROPATHY, WITH LONG-TERM CURRENT USE OF INSULIN (HCC): ICD-10-CM

## 2019-01-08 DIAGNOSIS — R63.4 WEIGHT LOSS: ICD-10-CM

## 2019-01-08 DIAGNOSIS — I50.9 ACUTE ON CHRONIC CONGESTIVE HEART FAILURE, UNSPECIFIED HEART FAILURE TYPE (HCC): ICD-10-CM

## 2019-01-08 DIAGNOSIS — I50.23 ACUTE ON CHRONIC SYSTOLIC CONGESTIVE HEART FAILURE (HCC): ICD-10-CM

## 2019-01-08 DIAGNOSIS — N18.6 END STAGE RENAL DISEASE ON DIALYSIS (HCC): ICD-10-CM

## 2019-01-08 DIAGNOSIS — N17.9 AKI (ACUTE KIDNEY INJURY) (HCC): Primary | ICD-10-CM

## 2019-01-08 DIAGNOSIS — Z99.2 HEMODIALYSIS PATIENT (HCC): ICD-10-CM

## 2019-01-08 PROBLEM — D63.1 ANEMIA DUE TO CHRONIC KIDNEY DISEASE, ON CHRONIC DIALYSIS (HCC): Status: ACTIVE | Noted: 2019-01-08

## 2019-01-08 PROBLEM — E11.29 TYPE 2 DIABETES MELLITUS WITH KIDNEY COMPLICATION, WITH LONG-TERM CURRENT USE OF INSULIN (HCC): Status: ACTIVE | Noted: 2019-01-08

## 2019-01-08 LAB
ANION GAP SERPL CALCULATED.3IONS-SCNC: 2 MMOL/L (ref 4–13)
ANION GAP SERPL CALCULATED.3IONS-SCNC: 4 MMOL/L (ref 4–13)
APTT PPP: 55 SECONDS (ref 26–38)
APTT PPP: 59 SECONDS (ref 26–38)
APTT PPP: 95 SECONDS (ref 26–38)
BASOPHILS # BLD AUTO: 0.04 THOUSANDS/ΜL (ref 0–0.1)
BASOPHILS NFR BLD AUTO: 1 % (ref 0–1)
BUN SERPL-MCNC: 28 MG/DL (ref 5–25)
BUN SERPL-MCNC: 36 MG/DL (ref 5–25)
CALCIUM SERPL-MCNC: 8.7 MG/DL (ref 8.3–10.1)
CALCIUM SERPL-MCNC: 9 MG/DL (ref 8.3–10.1)
CHLORIDE SERPL-SCNC: 85 MMOL/L (ref 100–108)
CHLORIDE SERPL-SCNC: 92 MMOL/L (ref 100–108)
CO2 SERPL-SCNC: 35 MMOL/L (ref 21–32)
CO2 SERPL-SCNC: 36 MMOL/L (ref 21–32)
CREAT SERPL-MCNC: 3.48 MG/DL (ref 0.6–1.3)
CREAT SERPL-MCNC: 4.03 MG/DL (ref 0.6–1.3)
EOSINOPHIL # BLD AUTO: 0.03 THOUSAND/ΜL (ref 0–0.61)
EOSINOPHIL NFR BLD AUTO: 0 % (ref 0–6)
ERYTHROCYTE [DISTWIDTH] IN BLOOD BY AUTOMATED COUNT: 16.4 % (ref 11.6–15.1)
ERYTHROCYTE [DISTWIDTH] IN BLOOD BY AUTOMATED COUNT: 16.6 % (ref 11.6–15.1)
GFR SERPL CREATININE-BSD FRML MDRD: 14 ML/MIN/1.73SQ M
GFR SERPL CREATININE-BSD FRML MDRD: 17 ML/MIN/1.73SQ M
GLUCOSE SERPL-MCNC: 356 MG/DL (ref 65–140)
GLUCOSE SERPL-MCNC: 381 MG/DL (ref 65–140)
GLUCOSE SERPL-MCNC: 397 MG/DL (ref 65–140)
GLUCOSE SERPL-MCNC: 448 MG/DL (ref 65–140)
GLUCOSE SERPL-MCNC: 449 MG/DL (ref 65–140)
GLUCOSE SERPL-MCNC: 469 MG/DL (ref 65–140)
HCT VFR BLD AUTO: 32 % (ref 36.5–49.3)
HCT VFR BLD AUTO: 32.6 % (ref 36.5–49.3)
HGB BLD-MCNC: 10 G/DL (ref 12–17)
HGB BLD-MCNC: 10 G/DL (ref 12–17)
IMM GRANULOCYTES # BLD AUTO: 0.04 THOUSAND/UL (ref 0–0.2)
IMM GRANULOCYTES NFR BLD AUTO: 1 % (ref 0–2)
LYMPHOCYTES # BLD AUTO: 1.54 THOUSANDS/ΜL (ref 0.6–4.47)
LYMPHOCYTES NFR BLD AUTO: 18 % (ref 14–44)
MCH RBC QN AUTO: 25.6 PG (ref 26.8–34.3)
MCH RBC QN AUTO: 26 PG (ref 26.8–34.3)
MCHC RBC AUTO-ENTMCNC: 30.7 G/DL (ref 31.4–37.4)
MCHC RBC AUTO-ENTMCNC: 31.3 G/DL (ref 31.4–37.4)
MCV RBC AUTO: 83 FL (ref 82–98)
MCV RBC AUTO: 84 FL (ref 82–98)
MONOCYTES # BLD AUTO: 0.7 THOUSAND/ΜL (ref 0.17–1.22)
MONOCYTES NFR BLD AUTO: 8 % (ref 4–12)
NEUTROPHILS # BLD AUTO: 6.39 THOUSANDS/ΜL (ref 1.85–7.62)
NEUTS SEG NFR BLD AUTO: 72 % (ref 43–75)
NRBC BLD AUTO-RTO: 0 /100 WBCS
PLATELET # BLD AUTO: 104 THOUSANDS/UL (ref 149–390)
PLATELET # BLD AUTO: 132 THOUSANDS/UL (ref 149–390)
PMV BLD AUTO: 10.8 FL (ref 8.9–12.7)
PMV BLD AUTO: 12 FL (ref 8.9–12.7)
POTASSIUM SERPL-SCNC: 4.8 MMOL/L (ref 3.5–5.3)
POTASSIUM SERPL-SCNC: 5.1 MMOL/L (ref 3.5–5.3)
RBC # BLD AUTO: 3.84 MILLION/UL (ref 3.88–5.62)
RBC # BLD AUTO: 3.9 MILLION/UL (ref 3.88–5.62)
SODIUM SERPL-SCNC: 125 MMOL/L (ref 136–145)
SODIUM SERPL-SCNC: 129 MMOL/L (ref 136–145)
WBC # BLD AUTO: 8.74 THOUSAND/UL (ref 4.31–10.16)
WBC # BLD AUTO: 9.64 THOUSAND/UL (ref 4.31–10.16)

## 2019-01-08 PROCEDURE — 85730 THROMBOPLASTIN TIME PARTIAL: CPT | Performed by: FAMILY MEDICINE

## 2019-01-08 PROCEDURE — 85027 COMPLETE CBC AUTOMATED: CPT | Performed by: INTERNAL MEDICINE

## 2019-01-08 PROCEDURE — 80048 BASIC METABOLIC PNL TOTAL CA: CPT | Performed by: INTERNAL MEDICINE

## 2019-01-08 PROCEDURE — 99223 1ST HOSP IP/OBS HIGH 75: CPT | Performed by: INTERNAL MEDICINE

## 2019-01-08 PROCEDURE — 82948 REAGENT STRIP/BLOOD GLUCOSE: CPT

## 2019-01-08 PROCEDURE — 85730 THROMBOPLASTIN TIME PARTIAL: CPT | Performed by: INTERNAL MEDICINE

## 2019-01-08 PROCEDURE — 99232 SBSQ HOSP IP/OBS MODERATE 35: CPT | Performed by: INTERNAL MEDICINE

## 2019-01-08 PROCEDURE — 85025 COMPLETE CBC W/AUTO DIFF WBC: CPT | Performed by: INTERNAL MEDICINE

## 2019-01-08 RX ORDER — HEPARIN SODIUM 10000 [USP'U]/100ML
3-20 INJECTION, SOLUTION INTRAVENOUS
Status: DISCONTINUED | OUTPATIENT
Start: 2019-01-08 | End: 2019-01-10

## 2019-01-08 RX ORDER — BENZONATATE 100 MG/1
100 CAPSULE ORAL 3 TIMES DAILY
Status: DISCONTINUED | OUTPATIENT
Start: 2019-01-09 | End: 2019-01-16 | Stop reason: HOSPADM

## 2019-01-08 RX ORDER — FUROSEMIDE 40 MG/1
40 TABLET ORAL
Status: DISCONTINUED | OUTPATIENT
Start: 2019-01-09 | End: 2019-01-13

## 2019-01-08 RX ORDER — CHOLECALCIFEROL (VITAMIN D3) 10 MCG
1 TABLET ORAL
Status: DISCONTINUED | OUTPATIENT
Start: 2019-01-09 | End: 2019-01-16 | Stop reason: HOSPADM

## 2019-01-08 RX ORDER — ASPIRIN 81 MG/1
81 TABLET, CHEWABLE ORAL DAILY
Status: DISCONTINUED | OUTPATIENT
Start: 2019-01-09 | End: 2019-01-16 | Stop reason: HOSPADM

## 2019-01-08 RX ORDER — FUROSEMIDE 40 MG/1
40 TABLET ORAL
Status: DISCONTINUED | OUTPATIENT
Start: 2019-01-08 | End: 2019-01-08 | Stop reason: HOSPADM

## 2019-01-08 RX ORDER — HEPARIN SODIUM 1000 [USP'U]/ML
4000 INJECTION, SOLUTION INTRAVENOUS; SUBCUTANEOUS AS NEEDED
Status: DISCONTINUED | OUTPATIENT
Start: 2019-01-08 | End: 2019-01-10

## 2019-01-08 RX ORDER — PANTOPRAZOLE SODIUM 40 MG/1
40 TABLET, DELAYED RELEASE ORAL
Status: DISCONTINUED | OUTPATIENT
Start: 2019-01-09 | End: 2019-01-16 | Stop reason: HOSPADM

## 2019-01-08 RX ORDER — FUROSEMIDE 40 MG/1
40 TABLET ORAL
Status: CANCELLED | OUTPATIENT
Start: 2019-01-09

## 2019-01-08 RX ORDER — ONDANSETRON 2 MG/ML
4 INJECTION INTRAMUSCULAR; INTRAVENOUS EVERY 6 HOURS PRN
Status: DISCONTINUED | OUTPATIENT
Start: 2019-01-08 | End: 2019-01-16 | Stop reason: HOSPADM

## 2019-01-08 RX ORDER — ATORVASTATIN CALCIUM 40 MG/1
40 TABLET, FILM COATED ORAL
Status: DISCONTINUED | OUTPATIENT
Start: 2019-01-09 | End: 2019-01-16 | Stop reason: HOSPADM

## 2019-01-08 RX ORDER — LANOLIN ALCOHOL/MO/W.PET/CERES
3 CREAM (GRAM) TOPICAL
Status: DISCONTINUED | OUTPATIENT
Start: 2019-01-08 | End: 2019-01-16 | Stop reason: HOSPADM

## 2019-01-08 RX ORDER — HEPARIN SODIUM 1000 [USP'U]/ML
2000 INJECTION, SOLUTION INTRAVENOUS; SUBCUTANEOUS AS NEEDED
Status: DISCONTINUED | OUTPATIENT
Start: 2019-01-08 | End: 2019-01-10

## 2019-01-08 RX ORDER — ACETAMINOPHEN 325 MG/1
650 TABLET ORAL EVERY 6 HOURS PRN
Status: DISCONTINUED | OUTPATIENT
Start: 2019-01-08 | End: 2019-01-16 | Stop reason: HOSPADM

## 2019-01-08 RX ORDER — INSULIN GLARGINE 100 [IU]/ML
15 INJECTION, SOLUTION SUBCUTANEOUS
Status: DISCONTINUED | OUTPATIENT
Start: 2019-01-08 | End: 2019-01-09

## 2019-01-08 RX ORDER — ATORVASTATIN CALCIUM 10 MG/1
10 TABLET, FILM COATED ORAL
Status: DISCONTINUED | OUTPATIENT
Start: 2019-01-09 | End: 2019-01-08

## 2019-01-08 RX ADMIN — INSULIN GLARGINE 15 UNITS: 100 INJECTION, SOLUTION SUBCUTANEOUS at 21:24

## 2019-01-08 RX ADMIN — ATORVASTATIN CALCIUM 10 MG: 10 TABLET, FILM COATED ORAL at 16:42

## 2019-01-08 RX ADMIN — Medication 1 CAPSULE: at 16:42

## 2019-01-08 RX ADMIN — CALCIUM ACETATE 667 MG: 667 CAPSULE ORAL at 08:47

## 2019-01-08 RX ADMIN — PANTOPRAZOLE SODIUM 40 MG: 40 TABLET, DELAYED RELEASE ORAL at 05:08

## 2019-01-08 RX ADMIN — METOPROLOL TARTRATE 25 MG: 25 TABLET, FILM COATED ORAL at 08:48

## 2019-01-08 RX ADMIN — HEPARIN SODIUM 2000 UNITS: 1000 INJECTION, SOLUTION INTRAVENOUS; SUBCUTANEOUS at 13:36

## 2019-01-08 RX ADMIN — HEPARIN SODIUM 2000 UNITS: 1000 INJECTION INTRAVENOUS; SUBCUTANEOUS at 21:25

## 2019-01-08 RX ADMIN — INSULIN LISPRO 6 UNITS: 100 INJECTION, SOLUTION INTRAVENOUS; SUBCUTANEOUS at 15:53

## 2019-01-08 RX ADMIN — CALCIUM ACETATE 667 MG: 667 CAPSULE ORAL at 16:42

## 2019-01-08 RX ADMIN — CALCIUM ACETATE 667 MG: 667 CAPSULE ORAL at 12:25

## 2019-01-08 RX ADMIN — MELATONIN 3 MG: at 21:25

## 2019-01-08 RX ADMIN — BENZONATATE 100 MG: 100 CAPSULE ORAL at 08:47

## 2019-01-08 RX ADMIN — BENZONATATE 100 MG: 100 CAPSULE ORAL at 16:42

## 2019-01-08 RX ADMIN — FUROSEMIDE 40 MG: 40 TABLET ORAL at 10:39

## 2019-01-08 RX ADMIN — HEPARIN SODIUM AND DEXTROSE 7 UNITS/KG/HR: 10000; 5 INJECTION INTRAVENOUS at 12:29

## 2019-01-08 RX ADMIN — INSULIN LISPRO 5 UNITS: 100 INJECTION, SOLUTION INTRAVENOUS; SUBCUTANEOUS at 23:44

## 2019-01-08 RX ADMIN — METOPROLOL TARTRATE 25 MG: 25 TABLET, FILM COATED ORAL at 21:26

## 2019-01-08 RX ADMIN — ASPIRIN 81 MG 81 MG: 81 TABLET ORAL at 08:47

## 2019-01-08 RX ADMIN — FUROSEMIDE 40 MG: 40 TABLET ORAL at 16:42

## 2019-01-08 NOTE — PROGRESS NOTES
NEPHROLOGY PROGRESS NOTE    Patient: Sourav Moran               Sex: male          DOA: 12/31/2018 12:47 PM   YOB: 1945        Age:  72 y o         LOS:  LOS: 8 days   1/8/2019    REASON FOR THE CONSULTATION:  Further management of NATALIIA    HPI     This is a 72 y o  male admitted for Shortness of breath     SUBJECTIVE     - tolerated dialysis well yesterday  Currently breathing is stable  Pending transfer to [facility] for possible CABG  Patient denies nausea / vomiting / headache / dizziness today    - Reviewed last 24 hrs events     CURRENT MEDICATIONS       Current Facility-Administered Medications:     acetaminophen (TYLENOL) tablet 650 mg, 650 mg, Oral, Q6H PRN, Davidek Kg Hilario MD, 650 mg at 01/07/19 1406    aspirin chewable tablet 81 mg, 81 mg, Oral, Daily, Carla Linares MD, 81 mg at 01/08/19 0847    atorvastatin (LIPITOR) tablet 10 mg, 10 mg, Oral, Daily With Katy Prescott MD, 10 mg at 01/07/19 1722    b complex-vitamin C-folic acid (NEPHROCAPS) capsule 1 capsule, 1 capsule, Oral, Daily With Beata Montanez MD, 1 capsule at 01/07/19 1722    benzonatate (TESSALON PERLES) capsule 100 mg, 100 mg, Oral, TID, Carla Linares MD, 100 mg at 01/08/19 0847    calcium acetate (PHOSLO) capsule 667 mg, 667 mg, Oral, TID With Meals, Gloria Rice MD, 667 mg at 01/08/19 1225    epoetin deepa (EPOGEN,PROCRIT) injection 2,000 Units, 2,000 Units, Intravenous, Once per day on Mon Wed Fri, 2,000 Units at 01/07/19 1215 **AND** epoetin deepa (EPOGEN,PROCRIT) injection 3,000 Units, 3,000 Units, Intravenous, Once per day on Mon Wed Fri, Gloria Rice MD, 3,000 Units at 01/07/19 1214    furosemide (LASIX) tablet 40 mg, 40 mg, Oral, BID (diuretic), Mirian Reynaga PA-C, 40 mg at 01/08/19 1039    heparin (porcine) 25,000 units in 250 mL infusion (premix), 3-20 Units/kg/hr (Order-Specific), Intravenous, Titrated, Unk Kg Hilario MD, Last Rate: 6 8 mL/hr at 01/08/19 1337, 9 Units/kg/hr at 01/08/19 1337    heparin (porcine) injection 2,000 Units, 2,000 Units, Intravenous, PRN, Frank Carpenter MD, 2,000 Units at 01/08/19 1336    heparin (porcine) injection 4,000 Units, 4,000 Units, Intravenous, PRN, Frank Carpenter MD    melatonin tablet 3 mg, 3 mg, Oral, HS, Netta Hilario MD, 3 mg at 01/07/19 2050    metoprolol tartrate (LOPRESSOR) tablet 25 mg, 25 mg, Oral, Q12H [address], Laura Rizvi PA-C, 25 mg at 01/08/19 0848    ondansetron (ZOFRAN) injection 4 mg, 4 mg, Intravenous, Q6H PRN, Jose Antonio Oliveira MD, 4 mg at 01/05/19 1220    pantoprazole (PROTONIX) EC tablet 40 mg, 40 mg, Oral, Early Morning, Jose Antonio Oliveira MD, 40 mg at 01/08/19 0508    OBJECTIVE     Current Weight: Weight - Scale: 67 kg (147 lb 11 3 oz)  Vitals:    01/08/19 0900   BP: 136/67   Pulse: 100   Resp:    Temp:    SpO2:      Body mass index is 24 58 kg/m²  Intake/Output Summary (Last 24 hours) at 01/08/19 1422  Last data filed at 01/07/19 1830   Gross per 24 hour   Intake              120 ml   Output                0 ml   Net              120 ml       PHYSICAL EXAMINATION     Physical Exam   Constitutional: No distress  Ill looking   HENT:   Head: Atraumatic  Eyes: Conjunctivae are normal  No scleral icterus  Neck: Neck supple  No JVD present  Pulmonary/Chest: No respiratory distress  He has no wheezes  Abdominal: Soft  He exhibits no distension  Lymphadenopathy:     He has no cervical adenopathy  Neurological: He is alert  Skin: Skin is warm  No cyanosis  Psychiatric: He is not agitated and not combative           LAB RESULTS       Results from last 7 days  Lab Units 01/08/19  0449 01/07/19  0624 01/06/19  1502 01/06/19  0510 01/05/19  0535 01/04/19  0450 01/03/19  0437 01/02/19  0432   WBC Thousand/uL 8 74 10 27* 8 81 7 97 8 32 8 70 7 59 10 18*   HEMOGLOBIN g/dL 10 0* 9 6* 10 6* 10 0* 10 2* 10 0* 10 0* 10 7*   HEMATOCRIT % 32 0* 30 6* 34 0* 32 5* 32 6* 30 7* 31 2* 33 2*   PLATELETS Thousands/uL 104* 149 148* 147* 186 214 232 244   POTASSIUM mmol/L 5 1 4 6  --  4 3 4 5 4 6 4 4 5 0   CHLORIDE mmol/L 92* 91*  --  95* 96* 92* 93* 94*   CO2 mmol/L 35* 36*  --  32 33* 32 30 25   BUN mg/dL 28* 38*  --  25 32* 60* 60* 58*   CREATININE mg/dL 3 48* 4 21*  --  3 25* 3 32* 5 06* 4 86* 4 78*   EGFR ml/min/1 73sq m 17 13  --  18 18 11 11 11   CALCIUM mg/dL 8 7 9 3  --  8 1* 8 9 9 5 9 1 9 6   PHOSPHORUS mg/dL  --   --   --   --   --   --   --  5 1*       I have personally reviewed the old medical records and patient's previously known baseline creatinine level is unknown    RADIOLOGY RESULTS      Results for orders placed during the hospital encounter of 12/31/18   X-ray chest 1 view portable    Narrative CHEST     INDICATION:   chest pain  Cough    COMPARISON:  None    EXAM PERFORMED/VIEWS:  XR CHEST PORTABLE      FINDINGS:    There is a suboptimal inspiration  This may partly explain the prominent appearance of the heart  Cardiomegaly not excluded  There is an ill-defined right lung infiltrate in the infrahilar region and there appear to be trace pleural effusions  Left lung clear  No pneumothorax identified  Osseous structures appear within normal limits for patient age  Impression Right-sided infiltrates suspicious for developing pneumonia as well as trace pleural effusions  Questionable cardiomegaly       Limited inspiration may exaggerate some of the findings  Suggest continued follow-up preferably with deeper inspiratory effort with PA and lateral views  Workstation performed: [id number]         PLAN / RECOMMENDATIONS      1  NATALIIA  Present on admission  Upon review of old medical record patient's baseline creatinine level is unknown but noticed to have worsening renal function associated with urea mail and started on dialysis during current hospital stay    Patient was also found to have nephrotic range proteinuria suggesting patient may have underlying chronic kidney disease  Currently patient is receiving dialysis on Monday Wednesday Friday schedule while in the hospital but now have approved outpatient dialysis Unit spot at [facility]  Unit on Tuesday Thursday Saturday schedule  Patient had underwent cardiac catheterization yesterday and found to have multivessel disease and awaiting to get transferred to [facility] for possible CABG  Will consider switching patient to TTS schedule prior to discharge  Currently hemodynamically stable and will plan next dialysis tomorrow  2  Hyponatremia  Multifactorial   Patient also have uncontrolled diabetes with current glucose of 448 with sodium of 129  Need better control of diabetes  Plan to manage volume status with dialysis  For time being continue Lasix 40 mg IV BID  Clinically patient is not in volume overload state and patient is asymptomatic from hyponatremia perspective  3  Anemia  Multifactorial and suspected due to anemia of chronic kidney disease  Current hemoglobin is 10 0  No active signs of bleeding seen overnight  Continue Epogen 5000 Units with dialysis       Disposition:  Awaiting transfer to [facility] for possible CABG  Patient now have approved outpatient dialysis spot at [facility]  Unit on Tuesday Thursday Saturday schedule  Plan was also d/w Lenin Goddard MD  Nephrology  1/8/2019        Portions of the record may have been created with voice recognition software  Occasional wrong word or "sound a like" substitutions may have occurred due to the inherent limitations of voice recognition software  Read the chart carefully and recognize, using context, where substitutions have occurred

## 2019-01-08 NOTE — PROGRESS NOTES
[facility] Internal Medicine Progress Note  Patient: Aspen Harris 72 y o  male   MRN: [id number]  PCP: No primary care provider on file  Unit/Bed#: -01 Encounter: [id number]  Date Of Visit: 01/08/19    Assessment:    Principal Problem:    Shortness of breath  Active Problems:    Chest pain    NATALIIA (acute kidney injury) ([facility] )    CKD (chronic kidney disease)    Cardiomyopathy (HCC)    Hyponatremia    MI, acute, non ST segment elevation (HCC)    Acute on chronic congestive heart failure ([facility] )      Plan:  Acute on chronic congestive heart failure  Cardiology and nephrology following  Continue p o  Lasix 40 mg b i d  Patient on hemodialysis  Monitor I&Os and daily weights    NSTEMI type 1/ischemic cardiomyopathy/CAD  Cardiac catheterization demonstrated triple-vessel disease, pending transfer to [facility] for CT surgery/possible CABG  Continue aspirin, beta-blocker statin IV heparin  Cardiology recommending to continue IV heparin for now    Acute kidney injury requiring hemodialysis in this hospitalization  Continue to monitor creatinine and  Nephrology recommendations appreciated    Type 2 DM  A1c 8 2%  Will start insulin sliding scale  Monitor sugars      VTE Pharmacologic Prophylaxis:   Pharmacologic: Heparin  Mechanical VTE Prophylaxis in Place: Yes    Patient Centered Rounds: I have performed bedside rounds with nursing staff today  Discussions with Specialists or Other Care Team Provider:  Cardiology, case management    Education and Discussions with Family / Patient:  Patient  Attempted to reach patient's son multiple times, left a voice message    Time Spent for Care: 30 minutes  More than 50% of total time spent on counseling and coordination of care as described above      Current Length of Stay: 8 day(s)    Current Patient Status: Inpatient   Certification Statement: The patient will continue to require additional inpatient hospital stay due to Transfer to [place] for CABG    Discharge Plan:  Pending transfer to [facility] for CT surgery/CABG    Code Status: Level 1 - Full Code      Subjective:   Patient seen and examined  Currently denies any complaints at this time  This morning he has some chest pain which resolved by itself  Patient anxiously waiting for transfer to [facility]  Objective:     Vitals:   Temp (24hrs), Av 3 °F (36 8 °C), Min:98 °F (36 7 °C), Max:98 8 °F (37 1 °C)    Temp:  [98 °F (36 7 °C)-98 8 °F (37 1 °C)] 98 2 °F (36 8 °C)  HR:  [] 100  Resp:  [16-20] 16  BP: (116-142)/(58-76) 136/67  SpO2:  [99 %-100 %] 99 %  Body mass index is 24 58 kg/m²  Input and Output Summary (last 24 hours): Intake/Output Summary (Last 24 hours) at 19 1425  Last data filed at 19 1830   Gross per 24 hour   Intake              120 ml   Output                0 ml   Net              120 ml       Physical Exam:     Alert and oriented x3  Membranes are moist  Lungs are clear to auscultation  Heart sounds are regular S1-S2  Abdomen soft nontender  Extremities no edema    Additional Data:     Labs:      Results from last 7 days  Lab Units 19  0449   WBC Thousand/uL 8 74   HEMOGLOBIN g/dL 10 0*   HEMATOCRIT % 32 0*   PLATELETS Thousands/uL 104*   NEUTROS PCT % 72   LYMPHS PCT % 18   MONOS PCT % 8   EOS PCT % 0       Results from last 7 days  Lab Units 19  0449  19  0510   POTASSIUM mmol/L 5 1  < > 4 3   CHLORIDE mmol/L 92*  < > 95*   CO2 mmol/L 35*  < > 32   BUN mg/dL 28*  < > 25   CREATININE mg/dL 3 48*  < > 3 25*   CALCIUM mg/dL 8 7  < > 8 1*   ALK PHOS U/L  --   --  61   ALT U/L  --   --  20   AST U/L  --   --  14   < > = values in this interval not displayed  Results from last 7 days  Lab Units 19  1502   INR  1 09       * I Have Reviewed All Lab Data Listed Above  * Additional Pertinent Lab Tests Reviewed:  [facility] Admission Reviewed    Imaging:    Imaging Reports Reviewed Today Include:   Imaging Personally Reviewed by Myself Includes:     Recent Cultures (last 7 days):           Last 24 Hours Medication List:     Current Facility-Administered Medications:  acetaminophen 650 mg Oral Q6H PRN Carli Pozo MD    aspirin 81 mg Oral Daily Terry Leach MD    atorvastatin 10 mg Oral Daily With Pancho Eastman MD    b complex-vitamin C-folic acid 1 capsule Oral Daily With Leyla Kirby MD    benzonatate 100 mg Oral TID Terry Leach MD    calcium acetate 667 mg Oral TID With Meals Jennifer Ceballos MD    epoetin deepa 2,000 Units Intravenous Once per day on Mon Wed Fri Jennifer Ceballos MD    And        epoetin deepa 3,000 Units Intravenous Once per day on Mon Wed Fri Jennifer Ceballos MD    furosemide 40 mg Oral BID (diuretic) Joanne Woods PA-C    heparin (porcine) 3-20 Units/kg/hr (Order-Specific) Intravenous Titrated Carli Pozo MD Last Rate: 9 Units/kg/hr (01/08/19 1337)   heparin (porcine) 2,000 Units Intravenous PRN Carlos Hilario MD    heparin (porcine) 4,000 Units Intravenous PRN Carlos Hilario MD    melatonin 3 mg Oral HS Valencia Hilario MD    metoprolol tartrate 25 mg Oral Q12H [address] Laura Rizvi PA-C    ondansetron 4 mg Intravenous Q6H PRN Terry Leach MD    pantoprazole 40 mg Oral Early Morning Terry Leach MD         Today, Patient Was Seen By: Thuy Pro MD    ** Please Note: Dragon 360 Dictation voice to text software may have been used in the creation of this document   **

## 2019-01-08 NOTE — SOCIAL WORK
CM received a call from [employer] with [facility] stating that there is a T,TH,S at 7am  CM spoke with pt at bedside with family member present  CM informed him of the chair time available  Pt stated that he does not know if his son will be available to transport at this time and will need to discuss with him  CM provided T[facility]CHERS with [facility] main number due to pt being transferred  CM assisted pt with completing [employer] application  CM informed pt that there will be photo id, proof of income and birth certificate that [employer] may still need  However, CM faxed application to [employer] and provided pt with original application forms

## 2019-01-08 NOTE — PROGRESS NOTES
Patient arrived to floor with heparin drip infusing at 9 07 u/kg /hr  Per mar heparin drip should be infusing at 9u/kg/hr

## 2019-01-09 ENCOUNTER — APPOINTMENT (INPATIENT)
Dept: NON INVASIVE DIAGNOSTICS | Facility: HOSPITAL | Age: 74
DRG: 280 | End: 2019-01-09
Payer: MEDICARE

## 2019-01-09 ENCOUNTER — ANESTHESIA EVENT (INPATIENT)
Dept: GASTROENTEROLOGY | Facility: HOSPITAL | Age: 74
DRG: 280 | End: 2019-01-09
Payer: MEDICARE

## 2019-01-09 ENCOUNTER — APPOINTMENT (INPATIENT)
Dept: DIALYSIS | Facility: HOSPITAL | Age: 74
DRG: 280 | End: 2019-01-09
Payer: MEDICARE

## 2019-01-09 DIAGNOSIS — Z71.89 COMPLEX CARE COORDINATION: Primary | ICD-10-CM

## 2019-01-09 PROBLEM — R63.4 WEIGHT LOSS: Status: ACTIVE | Noted: 2019-01-07

## 2019-01-09 LAB
ANION GAP SERPL CALCULATED.3IONS-SCNC: 6 MMOL/L (ref 4–13)
ANION GAP SERPL CALCULATED.3IONS-SCNC: 7 MMOL/L (ref 4–13)
APTT PPP: 44 SECONDS (ref 26–38)
APTT PPP: 64 SECONDS (ref 26–38)
APTT PPP: 96 SECONDS (ref 26–38)
BASOPHILS # BLD AUTO: 0.03 THOUSANDS/ΜL (ref 0–0.1)
BASOPHILS # BLD AUTO: 0.04 THOUSANDS/ΜL (ref 0–0.1)
BASOPHILS NFR BLD AUTO: 0 % (ref 0–1)
BASOPHILS NFR BLD AUTO: 0 % (ref 0–1)
BUN SERPL-MCNC: 37 MG/DL (ref 5–25)
BUN SERPL-MCNC: 40 MG/DL (ref 5–25)
CALCIUM SERPL-MCNC: 9 MG/DL (ref 8.3–10.1)
CALCIUM SERPL-MCNC: 9.1 MG/DL (ref 8.3–10.1)
CHLORIDE SERPL-SCNC: 87 MMOL/L (ref 100–108)
CHLORIDE SERPL-SCNC: 88 MMOL/L (ref 100–108)
CO2 SERPL-SCNC: 33 MMOL/L (ref 21–32)
CO2 SERPL-SCNC: 33 MMOL/L (ref 21–32)
CREAT SERPL-MCNC: 4.19 MG/DL (ref 0.6–1.3)
CREAT SERPL-MCNC: 4.48 MG/DL (ref 0.6–1.3)
EOSINOPHIL # BLD AUTO: 0.08 THOUSAND/ΜL (ref 0–0.61)
EOSINOPHIL # BLD AUTO: 0.09 THOUSAND/ΜL (ref 0–0.61)
EOSINOPHIL NFR BLD AUTO: 1 % (ref 0–6)
EOSINOPHIL NFR BLD AUTO: 1 % (ref 0–6)
ERYTHROCYTE [DISTWIDTH] IN BLOOD BY AUTOMATED COUNT: 16.3 % (ref 11.6–15.1)
ERYTHROCYTE [DISTWIDTH] IN BLOOD BY AUTOMATED COUNT: 16.5 % (ref 11.6–15.1)
GFR SERPL CREATININE-BSD FRML MDRD: 12 ML/MIN/1.73SQ M
GFR SERPL CREATININE-BSD FRML MDRD: 13 ML/MIN/1.73SQ M
GLUCOSE SERPL-MCNC: 114 MG/DL (ref 65–140)
GLUCOSE SERPL-MCNC: 174 MG/DL (ref 65–140)
GLUCOSE SERPL-MCNC: 184 MG/DL (ref 65–140)
GLUCOSE SERPL-MCNC: 212 MG/DL (ref 65–140)
GLUCOSE SERPL-MCNC: 246 MG/DL (ref 65–140)
GLUCOSE SERPL-MCNC: 273 MG/DL (ref 65–140)
HBV SURFACE AG SER QL: NORMAL
HCT VFR BLD AUTO: 29.1 % (ref 36.5–49.3)
HCT VFR BLD AUTO: 30.7 % (ref 36.5–49.3)
HCV AB SER QL: NORMAL
HGB BLD-MCNC: 9.2 G/DL (ref 12–17)
HGB BLD-MCNC: 9.6 G/DL (ref 12–17)
HIV 1+2 AB+HIV1 P24 AG SERPL QL IA: NORMAL
HIV1 P24 AG SER QL: NORMAL
IMM GRANULOCYTES # BLD AUTO: 0.03 THOUSAND/UL (ref 0–0.2)
IMM GRANULOCYTES # BLD AUTO: 0.03 THOUSAND/UL (ref 0–0.2)
IMM GRANULOCYTES NFR BLD AUTO: 0 % (ref 0–2)
IMM GRANULOCYTES NFR BLD AUTO: 0 % (ref 0–2)
LYMPHOCYTES # BLD AUTO: 1.6 THOUSANDS/ΜL (ref 0.6–4.47)
LYMPHOCYTES # BLD AUTO: 1.68 THOUSANDS/ΜL (ref 0.6–4.47)
LYMPHOCYTES NFR BLD AUTO: 17 % (ref 14–44)
LYMPHOCYTES NFR BLD AUTO: 17 % (ref 14–44)
MCH RBC QN AUTO: 26.1 PG (ref 26.8–34.3)
MCH RBC QN AUTO: 26.1 PG (ref 26.8–34.3)
MCHC RBC AUTO-ENTMCNC: 31.3 G/DL (ref 31.4–37.4)
MCHC RBC AUTO-ENTMCNC: 31.6 G/DL (ref 31.4–37.4)
MCV RBC AUTO: 82 FL (ref 82–98)
MCV RBC AUTO: 83 FL (ref 82–98)
MONOCYTES # BLD AUTO: 0.77 THOUSAND/ΜL (ref 0.17–1.22)
MONOCYTES # BLD AUTO: 0.79 THOUSAND/ΜL (ref 0.17–1.22)
MONOCYTES NFR BLD AUTO: 8 % (ref 4–12)
MONOCYTES NFR BLD AUTO: 8 % (ref 4–12)
NEUTROPHILS # BLD AUTO: 7.07 THOUSANDS/ΜL (ref 1.85–7.62)
NEUTROPHILS # BLD AUTO: 7.53 THOUSANDS/ΜL (ref 1.85–7.62)
NEUTS SEG NFR BLD AUTO: 74 % (ref 43–75)
NEUTS SEG NFR BLD AUTO: 74 % (ref 43–75)
NRBC BLD AUTO-RTO: 0 /100 WBCS
NRBC BLD AUTO-RTO: 0 /100 WBCS
PLATELET # BLD AUTO: 125 THOUSANDS/UL (ref 149–390)
PLATELET # BLD AUTO: 138 THOUSANDS/UL (ref 149–390)
PMV BLD AUTO: 11.5 FL (ref 8.9–12.7)
PMV BLD AUTO: 12.2 FL (ref 8.9–12.7)
POTASSIUM SERPL-SCNC: 4.4 MMOL/L (ref 3.5–5.3)
POTASSIUM SERPL-SCNC: 4.5 MMOL/L (ref 3.5–5.3)
RBC # BLD AUTO: 3.53 MILLION/UL (ref 3.88–5.62)
RBC # BLD AUTO: 3.68 MILLION/UL (ref 3.88–5.62)
SODIUM SERPL-SCNC: 127 MMOL/L (ref 136–145)
SODIUM SERPL-SCNC: 127 MMOL/L (ref 136–145)
WBC # BLD AUTO: 10.13 THOUSAND/UL (ref 4.31–10.16)
WBC # BLD AUTO: 9.61 THOUSAND/UL (ref 4.31–10.16)

## 2019-01-09 PROCEDURE — 90935 HEMODIALYSIS ONE EVALUATION: CPT | Performed by: INTERNAL MEDICINE

## 2019-01-09 PROCEDURE — 85025 COMPLETE CBC W/AUTO DIFF WBC: CPT | Performed by: INTERNAL MEDICINE

## 2019-01-09 PROCEDURE — 80048 BASIC METABOLIC PNL TOTAL CA: CPT | Performed by: INTERNAL MEDICINE

## 2019-01-09 PROCEDURE — 82948 REAGENT STRIP/BLOOD GLUCOSE: CPT

## 2019-01-09 PROCEDURE — 99232 SBSQ HOSP IP/OBS MODERATE 35: CPT | Performed by: INTERNAL MEDICINE

## 2019-01-09 PROCEDURE — 85730 THROMBOPLASTIN TIME PARTIAL: CPT | Performed by: INTERNAL MEDICINE

## 2019-01-09 PROCEDURE — 99222 1ST HOSP IP/OBS MODERATE 55: CPT | Performed by: INTERNAL MEDICINE

## 2019-01-09 PROCEDURE — 86803 HEPATITIS C AB TEST: CPT | Performed by: INTERNAL MEDICINE

## 2019-01-09 PROCEDURE — 87340 HEPATITIS B SURFACE AG IA: CPT | Performed by: INTERNAL MEDICINE

## 2019-01-09 PROCEDURE — 87806 HIV AG W/HIV1&2 ANTB W/OPTIC: CPT | Performed by: INTERNAL MEDICINE

## 2019-01-09 PROCEDURE — 87081 CULTURE SCREEN ONLY: CPT | Performed by: PHYSICIAN ASSISTANT

## 2019-01-09 PROCEDURE — 93971 EXTREMITY STUDY: CPT

## 2019-01-09 PROCEDURE — 5A1D70Z PERFORMANCE OF URINARY FILTRATION, INTERMITTENT, LESS THAN 6 HOURS PER DAY: ICD-10-PCS | Performed by: INTERNAL MEDICINE

## 2019-01-09 PROCEDURE — 93880 EXTRACRANIAL BILAT STUDY: CPT

## 2019-01-09 PROCEDURE — 99223 1ST HOSP IP/OBS HIGH 75: CPT | Performed by: THORACIC SURGERY (CARDIOTHORACIC VASCULAR SURGERY)

## 2019-01-09 RX ORDER — CHLORHEXIDINE GLUCONATE 0.12 MG/ML
15 RINSE ORAL EVERY 12 HOURS SCHEDULED
Status: DISCONTINUED | OUTPATIENT
Start: 2019-01-09 | End: 2019-01-15

## 2019-01-09 RX ORDER — INSULIN GLARGINE 100 [IU]/ML
20 INJECTION, SOLUTION SUBCUTANEOUS
Status: DISCONTINUED | OUTPATIENT
Start: 2019-01-09 | End: 2019-01-10

## 2019-01-09 RX ADMIN — MUPIROCIN 1 APPLICATION: 20 OINTMENT TOPICAL at 13:09

## 2019-01-09 RX ADMIN — INSULIN LISPRO 1 UNITS: 100 INJECTION, SOLUTION INTRAVENOUS; SUBCUTANEOUS at 22:08

## 2019-01-09 RX ADMIN — PANTOPRAZOLE SODIUM 40 MG: 40 TABLET, DELAYED RELEASE ORAL at 06:37

## 2019-01-09 RX ADMIN — CALCIUM ACETATE 667 MG: 667 CAPSULE ORAL at 11:48

## 2019-01-09 RX ADMIN — EPOETIN ALFA 3000 UNITS: 3000 SOLUTION INTRAVENOUS; SUBCUTANEOUS at 14:20

## 2019-01-09 RX ADMIN — EPOETIN ALFA 2000 UNITS: 2000 SOLUTION INTRAVENOUS; SUBCUTANEOUS at 14:19

## 2019-01-09 RX ADMIN — BENZONATATE 100 MG: 100 CAPSULE ORAL at 08:56

## 2019-01-09 RX ADMIN — ASPIRIN 81 MG 81 MG: 81 TABLET ORAL at 18:08

## 2019-01-09 RX ADMIN — CHLORHEXIDINE GLUCONATE 0.12% ORAL RINSE 15 ML: 1.2 LIQUID ORAL at 22:08

## 2019-01-09 RX ADMIN — CALCIUM ACETATE 667 MG: 667 CAPSULE ORAL at 18:08

## 2019-01-09 RX ADMIN — HEPARIN SODIUM AND DEXTROSE 11 UNITS/KG/HR: 10000; 5 INJECTION INTRAVENOUS at 15:32

## 2019-01-09 RX ADMIN — BENZONATATE 100 MG: 100 CAPSULE ORAL at 22:07

## 2019-01-09 RX ADMIN — HEPARIN SODIUM 2000 UNITS: 1000 INJECTION INTRAVENOUS; SUBCUTANEOUS at 04:32

## 2019-01-09 RX ADMIN — BENZONATATE 100 MG: 100 CAPSULE ORAL at 18:08

## 2019-01-09 RX ADMIN — INSULIN LISPRO 4 UNITS: 100 INJECTION, SOLUTION INTRAVENOUS; SUBCUTANEOUS at 11:49

## 2019-01-09 RX ADMIN — INSULIN GLARGINE 20 UNITS: 100 INJECTION, SOLUTION SUBCUTANEOUS at 22:08

## 2019-01-09 RX ADMIN — INSULIN LISPRO 3 UNITS: 100 INJECTION, SOLUTION INTRAVENOUS; SUBCUTANEOUS at 06:38

## 2019-01-09 RX ADMIN — ATORVASTATIN CALCIUM 40 MG: 40 TABLET, FILM COATED ORAL at 18:08

## 2019-01-09 RX ADMIN — METOPROLOL TARTRATE 25 MG: 25 TABLET, FILM COATED ORAL at 08:56

## 2019-01-09 RX ADMIN — INSULIN LISPRO 3 UNITS: 100 INJECTION, SOLUTION INTRAVENOUS; SUBCUTANEOUS at 08:56

## 2019-01-09 RX ADMIN — FUROSEMIDE 40 MG: 40 TABLET ORAL at 08:56

## 2019-01-09 RX ADMIN — Medication 1 CAPSULE: at 18:08

## 2019-01-09 RX ADMIN — MUPIROCIN 1 APPLICATION: 20 OINTMENT TOPICAL at 22:08

## 2019-01-09 RX ADMIN — MELATONIN 3 MG: at 22:08

## 2019-01-09 RX ADMIN — CALCIUM ACETATE 667 MG: 667 CAPSULE ORAL at 08:56

## 2019-01-09 RX ADMIN — FUROSEMIDE 40 MG: 40 TABLET ORAL at 18:08

## 2019-01-09 RX ADMIN — METOPROLOL TARTRATE 25 MG: 25 TABLET, FILM COATED ORAL at 22:07

## 2019-01-09 NOTE — UTILIZATION REVIEW
Initial Clinical Review    Admission: Date/Time/Statement: 1/8/19 @ 1837  Orders Placed This Encounter   Procedures    Inpatient Admission     Standing Status:   Standing     Number of Occurrences:   1     Order Specific Question:   Admitting Physician     Answer:   Fabiola Luis [[id number]]     Order Specific Question:   Level of Care     Answer:   Med Surg [[id number]]     Order Specific Question:   Estimated length of stay     Answer:   More than 2 Midnights     Order Specific Question:   Certification     Answer:   I certify that inpatient services are medically necessary for this patient for a duration of greater than two midnights  See H&P and MD Progress Notes for additional information about the patient's course of treatment  Presented to the ED @ [facility], Admitted, Tasked to [place] : Per PA Note (01/01/2019):  Dr MARLEN Valero - , transferred to [facility] via EMS, higher level of care  Chief Complaint: Transferred from [facility] for CT surgery evaluation for multivessel coronary artery disease  Presented there with worsening shortness of breath  History of Illness:   Gwenette Cogan is a 72 y o  male who as a transfer from [facility] for CT surgery evaluation for multivessel coronary artery disease      He presented there due to worsening shortness of breath, lower extremity edema and orthopnea  He said he had recent hospitalization prior to this said [facility] for congestive heart failure  Was discharged on diuretics but his symptoms did not improve  On admission there his creatinine was noted to be significantly elevated with elevated troponin  His symptoms were most likely from congestive heart failure and possible NSTEMI  He was started on IV diuretics  Because of his worsening creatinine despite diuresis, patient was started on hemodialysis 1/14/2019  After this patient underwent cardiac catheterization that showed multivessel coronary artery disease    So it was recommended to transfer the patient here for CT surgery evaluation for CABG  Vital Signs:   Temperature Pulse Respirations Blood Pressure SpO2   01/08/19 1904 01/08/19 1904 01/08/19 1904 01/08/19 1904 01/08/19 1904   97 6 °F (36 4 °C) 85 18 157/79 100 %      Temp Source Heart Rate Source Patient Position - Orthostatic VS BP Location FiO2 (%)   01/08/19 1904 01/08/19 1904 01/08/19 1904 01/08/19 1904 --   Oral Monitor Lying Right arm       Pain Score       01/08/19 2000       No Pain        Wt Readings from Last 1 Encounters:   01/09/19 62 8 kg (138 lb 8 oz)   Pertinent Labs/Diagnostic Test Results:   WBC Thousand/uL 8 74   HEMOGLOBIN g/dL 10 0*   HEMATOCRIT % 32 0*   PLATELETS Thousands/uL 104*     SODIUM mmol/L 129*   POTASSIUM mmol/L 5 1   CHLORIDE mmol/L 92*   CO2 mmol/L 35*   BUN mg/dL 28*   CREATININE mg/dL 3 48*   ANION GAP mmol/L 2*   CALCIUM mg/dL 8 7   Glucose 448      Past Medical/Surgical History: Active Ambulatory Problems     Diagnosis Date Noted    Shortness of breath 12/31/2018    Chest pain 12/31/2018    NATALIIA [facility]) ([facility] ) 12/31/2018    CKD (chronic kidney disease) 12/31/2018    Cardiomyopathy ([facility] ) 12/31/2018    Hyponatremia 01/01/2019    MI, acute, non ST segment elevation ([facility] ) 01/03/2019    Acute on chronic congestive heart failure ([facility] ) 01/03/2019    Anemia due to chronic kidney disease, on chronic dialysis ([facility] ) 01/08/2019     Past Medical History:   Diagnosis Date    [facility]congest[facility] heart failure) ([facility]     Diabetes mellitus ([facility] )     Dysphagia     Hyperlipidemia     Hypertension     Myocardial infarction [facility])      Admitting Diagnosis: Chest pain, cardiac [R07 9]  Age/Sex: 72 y o  male  Assessment/Plan:   * MI, acute, non ST segment elevation (HCC)   Assessment & Plan     With multivessel coronary artery disease seen on cardiac catheterization  Patient transferred for CT surgery evaluation  Will consult  Continue IV heparin for now    Continue aspirin, statin and metoprolol  Will increase atorvastatin from 10 to 40  Patient currently asymptomatic       Acute on chronic congestive heart failure [facility])   Assessment & Plan     Patient said that he had recent admission at [facility] for congestive heart failure  Cardiomyopathy most likely ischemic   EF 35%  on echocardiogram   Patient currently on p o  Lasix but volume status currently being managed with dialysis  Appears euvolemic currently  Consult Cardiology       Dysphagia   Assessment & Plan     Patient reported that he has been having only liquid since last 6 months  Part of it is because he does not have any taste and also admitted to having trouble swallowing  He said he has lost approximately 30 lb in last 6 months  ? Due to CHF treatment  Continue to monitor dietary intake  If continues to be low consider GI evaluation       Type 2 diabetes mellitus with kidney complication, with long-term current use of insulin [facility])   Assessment & Plan             Lab Results   Component Value Date     HGBA1C 8 2 (H) 01/02/2019              Recent Labs      01/08/19   1440  01/08/19   1540   POCGLU  449*  469*         Blood Sugar Average: Last 72 hrs:     Blood glucose significantly elevated in 400s  Will check stat Accucheck  Patient currently on sliding scale only  Will start the patient on basal bolus regimen  Lantus 15 units with Humalog 3 units t i d  Plus sliding scale       NATALIIA (acute kidney injury) ([facility] )   Assessment & Plan     Baseline creatinine unknown  Patient had significantly elevated creatinine on admission at [facility]  Was evaluated by Nephrology  Because of worsening renal function had to be started on dialysis at [facility] on 1/4/2019  Will consult Nephrology for dialysis         VTE Prophylaxis: Heparin  / sequential compression device   Anticipated Length of Stay:  Patient will be admitted on an Inpatient basis with an anticipated length of stay of  > 2 midnights  Justification for Hospital Stay: aboveAdmission Orders:  Scheduled Meds:   Current Facility-Administered Medications:  acetaminophen 650 mg Oral Q6H PRN Lisette Dubois MD    aspirin 81 mg Oral Daily Lisette Dubois MD    atorvastatin 40 mg Oral Daily With Jac Moreno MD    b complex-vitamin C-folic acid 1 capsule Oral Daily With Jac Moreno MD    benzonatate 100 mg Oral TID Lisette Dubois MD    calcium acetate 667 mg Oral TID With Meals Lisette Dubois MD    epoetin deepa 2,000 Units Intravenous After Dialysis Lisette Dubois MD    And        epoetin deepa 3,000 Units Intravenous After Dialysis Lisette Dubois MD    furosemide 40 mg Oral BID (diuretic) Lisette Dubois MD    heparin (porcine) 3-20 Units/kg/hr (Order-Specific) Intravenous Titrated Lisette Dubois MD Last Rate: 11 Units/kg/hr (01/09/19 1215)   heparin (porcine) 2,000 Units Intravenous PRN Lisette Dubois MD    heparin (porcine) 4,000 Units Intravenous PRN Lisette Dubois MD    insulin glargine 20 Units Subcutaneous HS Imelda Woods MD    insulin lispro 1-5 Units Subcutaneous HS Lisette Dubois MD    insulin lispro 1-6 Units Subcutaneous TID AC Lisette Dubois MD    insulin lispro 8 Units Subcutaneous TID With Meals Imelda Woods MD    melatonin 3 mg Oral HS Lisette Dubois MD    metoprolol tartrate 25 mg Oral Q12H [address] Lisette Dubois MD    mupirocin 1 application Nasal L50R [address] Alka Carreon PA-C    ondansetron 4 mg Intravenous Q6H PRN Lisette Dubois MD    pantoprazole 40 mg Oral Early Morning Lisette Dubois MD      Continuous Infusions:   heparin (porcine) 3-20 Units/kg/hr (Order-Specific) Last Rate: 11 Units/kg/hr (01/09/19 1215)     HD adult  Carotid study: pending  Lower limb vein mapping: pending  Consult PT  Consult Speech  Consult ENDO  Consult GI  TELM  O2 @ @L via [place]  [place] SCDs

## 2019-01-09 NOTE — PROGRESS NOTES
Progress Note - Cardiology   Fartun Best 72 y o  male MRN: [id number]  Unit/Bed#: [facility] 403-01 Encounter: [id number]        Principal Problem:    MI, acute, non ST segment elevation ([facility] )  Active Problems:    NATALIIA (acute kidney injury) ([facility] )    Acute on chronic congestive heart failure ([facility] )    Type 2 diabetes mellitus with kidney complication, with long-term current use of insulin ([facility] )    Dysphagia      Patient was transferred from [facility] for CT surgery evaluation for multivessel coronary artery disease  He presented there with shortness of breath, edema and orthopnea  He was initiated on intravenous diuretics  Pt came from [place] to seek medical care  Reports that he lost 20 lbs in 6 months  He is unable to eat because of no hunger  He feels weak, debilitated  Assessment/Plan    1  Multivessel coronary artery disease  Status post cardiac catheterization 1/7-prox left main 90% left PDA 90% distal left main 50%  On aspirin 81, atorvastatin 40, Lopressor 25 b i d  IV heparin  Sent from [facility] for CT surgical evaluation  2  NSTEMI -likely type 2 secondary to heart failure in the setting of multivessel coronary artery disease  2  Acute on chronic systolic heart failure  Echocardiogram-EF 35% with severe diffuse hypokinesis  RV mildly dilated  Moderate MR  Mild TR  Estimated peak PA pressure 60 mm Hg  Volume management per Nephrology/HD  Also on Lasix 40 b i d     3  Acute kidney injury/CKD-required hemodialysis  Creatinine on presentation 3 25 which peaked post catheterization 4 21    4  Diabetes mellitus type 2-hemoglobin A1c to 8 2%    5  Anemia- suspect chronic disease  No obvious signs of bleeding  6  HTN-modestly controlled    7  Debilitated/weakness/poor nutritional status -patient overall reports a decline in his health in the last 6 months  He said he moved from [place] to seek medical attention    Had [place] the  states [place] then moved to [place] which was to be permanently  He said he is having difficulty eating the past 6 months because he has no taste and no appetite  He wants quality of life more than he wants longevity  Rec- PT evaluation  Subjective/Objective   Chief Complaint/Subjective  It took a while until the patient became awake enough to be able to converse  He feels very weak  He said he has been debilitated and weak for the past 6 months  At this point he feels short of breath just with conversation  Denies any chest pain  He is aware easy for CT surgery evaluation           Vitals: /81 (BP Location: Right arm) Comment: Map 112  Pulse 87   Temp 97 9 °F (36 6 °C) (Oral)   Resp 20   Wt 62 8 kg (138 lb 8 oz)   SpO2 100%   BMI 23 05 kg/m²     Vitals:    01/09/19 0500   Weight: 62 8 kg (138 lb 8 oz)     Orthostatic Blood Pressures      Most Recent Value   Blood Pressure  158/81 [Map 112] filed at 01/09/2019 0703   Patient Position - Orthostatic VS  Sitting filed at 01/09/2019 0703            Intake/Output Summary (Last 24 hours) at 01/09/19 4365  Last data filed at 01/09/19 4673   Gross per 24 hour   Intake           570 25 ml   Output              550 ml   Net            20 25 ml       Invasive Devices     Peripheral Intravenous Line            Peripheral IV 01/05/19 Left Antecubital 3 days          Hemodialysis Catheter            Permanent HD Catheter  5 days                Current Facility-Administered Medications   Medication Dose Route Frequency    acetaminophen (TYLENOL) tablet 650 mg  650 mg Oral Q6H PRN    aspirin chewable tablet 81 mg  81 mg Oral Daily    atorvastatin (LIPITOR) tablet 40 mg  40 mg Oral Daily With Dinner    b complex-vitamin C-folic acid (NEPHROCAPS) capsule 1 capsule  1 capsule Oral Daily With Dinner    benzonatate (TESSALON PERLES) capsule 100 mg  100 mg Oral TID    calcium acetate (PHOSLO) capsule 667 mg  667 mg Oral TID With Meals    epoetin deeap (EPOGEN,PROCRIT) injection 2,000 Units 2,000 Units Intravenous After Dialysis    And    epoetin deepa (EPOGEN,PROCRIT) injection 3,000 Units  3,000 Units Intravenous After Dialysis    furosemide (LASIX) tablet 40 mg  40 mg Oral BID (diuretic)    heparin (porcine) 25,000 units in 250 mL infusion (premix)  3-20 Units/kg/hr (Order-Specific) Intravenous Titrated    heparin (porcine) injection 2,000 Units  2,000 Units Intravenous PRN    heparin (porcine) injection 4,000 Units  4,000 Units Intravenous PRN    insulin glargine (LANTUS) subcutaneous injection 15 Units 0 15 mL  15 Units Subcutaneous HS    insulin lispro (HumaLOG) 100 units/mL subcutaneous injection 1-5 Units  1-5 Units Subcutaneous HS    insulin lispro (HumaLOG) 100 units/mL subcutaneous injection 1-6 Units  1-6 Units Subcutaneous TID AC    insulin lispro (HumaLOG) 100 units/mL subcutaneous injection 3 Units  3 Units Subcutaneous TID With Meals    melatonin tablet 3 mg  3 mg Oral HS    metoprolol tartrate (LOPRESSOR) tablet 25 mg  25 mg Oral Q12H JUAN C    ondansetron (ZOFRAN) injection 4 mg  4 mg Intravenous Q6H PRN    pantoprazole (PROTONIX) EC tablet 40 mg  40 mg Oral Early Morning         Physical Exam: /81 (BP Location: Right arm) Comment: Map 112  Pulse 87   Temp 97 9 °F (36 6 °C) (Oral)   Resp 20   Wt 62 8 kg (138 lb 8 oz)   SpO2 100%   BMI 23 05 kg/m²     General Appearance:    Alert, cooperative, no distress, appears weakened   Head:    Normocephalic, no scleral icterus   Eyes:    PERRL   Nose:   Nares normal, septum midline, no drainage    Throat:   Lips, mucosa, and tongue normal   Neck:   Supple, symmetrical, trachea midline,            Lungs:     Clear to auscultation bilaterally, respirations unlabored        Heart:    Regular rate and rhythm, S1 and S2 normal, no murmur, rub   or gallop   Abdomen:     Soft, semi firm   Extremities:   Extremities normal, atraumatic, no cyanosis or edema       Skin:   Skin warm   Neurologic:   Alert and oriented to person place and time, no focal deficits                 Lab Results:   Recent Results (from the past 72 hour(s))   APTT six (6) hours after Heparin bolus/drip initiation or dosing change    Collection Time: 01/06/19  3:02 PM   Result Value Ref Range    PTT 41 (H) 26 - 38 seconds   CBC    Collection Time: 01/06/19  3:02 PM   Result Value Ref Range    WBC 8 81 4 31 - 10 16 Thousand/uL    RBC 4 12 3 88 - 5 62 Million/uL    Hemoglobin 10 6 (L) 12 0 - 17 0 g/dL    Hematocrit 34 0 (L) 36 5 - 49 3 %    MCV 83 82 - 98 fL    MCH 25 7 (L) 26 8 - 34 3 pg    MCHC 31 2 (L) 31 4 - 37 4 g/dL    RDW 16 5 (H) 11 6 - 15 1 %    Platelets 015 (L) 288 - 390 Thousands/uL    MPV 10 9 8 9 - 12 7 fL   Protime-INR    Collection Time: 01/06/19  3:02 PM   Result Value Ref Range    Protime 14 0 11 8 - 14 2 seconds    INR 1 09 0 86 - 1 17   ECG 12 lead    Collection Time: 01/06/19  4:51 PM   Result Value Ref Range    Ventricular Rate 85 BPM    Atrial Rate 85 BPM    NC Interval 260 ms    QRSD Interval 98 ms    QT Interval 394 ms    QTC Interval 468 ms    P [place] 28 degrees    QRS Axis -22 degrees    T Wave Axis 144 degrees   Troponin I    Collection Time: 01/06/19  5:21 PM   Result Value Ref Range    Troponin I 0 33 (H) <=0 04 ng/mL   APTT    Collection Time: 01/06/19  5:37 PM   Result Value Ref Range     (H) 26 - 38 seconds   Troponin I    Collection Time: 01/06/19  8:29 PM   Result Value Ref Range    Troponin I 0 30 (H) <=0 04 ng/mL   Troponin I    Collection Time: 01/06/19 11:47 PM   Result Value Ref Range    Troponin I 0 28 (H) <=0 04 ng/mL   APTT    Collection Time: 01/06/19 11:47 PM   Result Value Ref Range     (HH) 26 - 38 seconds   ECG 12 lead    Collection Time: 01/07/19  5:23 AM   Result Value Ref Range    Ventricular Rate 87 BPM    Atrial Rate 87 BPM    NC Interval 260 ms    QRSD Interval 96 ms    QT Interval 382 ms    QTC Interval 459 ms    P [place] 69 degrees    QRS Axis -18 degrees    T Wave Axis 127 degrees   Basic metabolic panel Collection Time: 01/07/19  6:24 AM   Result Value Ref Range    Sodium 130 (L) 136 - 145 mmol/L    Potassium 4 6 3 5 - 5 3 mmol/L    Chloride 91 (L) 100 - 108 mmol/L    CO2 36 (H) 21 - 32 mmol/L    ANION GAP 3 (L) 4 - 13 mmol/L    BUN 38 (H) 5 - 25 mg/dL    Creatinine 4 21 (H) 0 60 - 1 30 mg/dL    Glucose 284 (H) 65 - 140 mg/dL    Calcium 9 3 8 3 - 10 1 mg/dL    eGFR 13 ml/min/1 73sq m   CBC and differential    Collection Time: 01/07/19  6:24 AM   Result Value Ref Range    WBC 10 27 (H) 4 31 - 10 16 Thousand/uL    RBC 3 72 (L) 3 88 - 5 62 Million/uL    Hemoglobin 9 6 (L) 12 0 - 17 0 g/dL    Hematocrit 30 6 (L) 36 5 - 49 3 %    MCV 82 82 - 98 fL    MCH 25 8 (L) 26 8 - 34 3 pg    MCHC 31 4 31 4 - 37 4 g/dL    RDW 16 2 (H) 11 6 - 15 1 %    MPV 11 3 8 9 - 12 7 fL    Platelets 492 371 - 433 Thousands/uL    nRBC 0 /100 WBCs    Neutrophils Relative 72 43 - 75 %    Immat GRANS % 0 0 - 2 %    Lymphocytes Relative 17 14 - 44 %    Monocytes Relative 9 4 - 12 %    Eosinophils Relative 1 0 - 6 %    Basophils Relative 1 0 - 1 %    Neutrophils Absolute 7 41 1 85 - 7 62 Thousands/µL    Immature Grans Absolute 0 04 0 00 - 0 20 Thousand/uL    Lymphocytes Absolute 1 79 0 60 - 4 47 Thousands/µL    Monocytes Absolute 0 87 0 17 - 1 22 Thousand/µL    Eosinophils Absolute 0 11 0 00 - 0 61 Thousand/µL    Basophils Absolute 0 05 0 00 - 0 10 Thousands/µL   APTT    Collection Time: 01/07/19  7:36 AM   Result Value Ref Range    PTT 82 (H) 26 - 38 seconds   POCT activated clotting time    Collection Time: 01/07/19  8:44 AM   Result Value Ref Range    Activated Clotting Time, i-STAT 138 (H) 89 - 137 sec    Specimen Type ARTERIAL    APTT    Collection Time: 01/07/19  9:11 PM   Result Value Ref Range    PTT 47 (H) 26 - 38 seconds   APTT    Collection Time: 01/08/19  4:49 AM   Result Value Ref Range    PTT 95 (H) 26 - 38 seconds   Basic metabolic panel    Collection Time: 01/08/19  4:49 AM   Result Value Ref Range    Sodium 129 (L) 136 - 145 mmol/L    Potassium 5 1 3 5 - 5 3 mmol/L    Chloride 92 (L) 100 - 108 mmol/L    CO2 35 (H) 21 - 32 mmol/L    ANION GAP 2 (L) 4 - 13 mmol/L    BUN 28 (H) 5 - 25 mg/dL    Creatinine 3 48 (H) 0 60 - 1 30 mg/dL    Glucose 448 (H) 65 - 140 mg/dL    Calcium 8 7 8 3 - 10 1 mg/dL    eGFR 17 ml/min/1 73sq m   CBC and differential    Collection Time: 01/08/19  4:49 AM   Result Value Ref Range    WBC 8 74 4 31 - 10 16 Thousand/uL    RBC 3 84 (L) 3 88 - 5 62 Million/uL    Hemoglobin 10 0 (L) 12 0 - 17 0 g/dL    Hematocrit 32 0 (L) 36 5 - 49 3 %    MCV 83 82 - 98 fL    MCH 26 0 (L) 26 8 - 34 3 pg    MCHC 31 3 (L) 31 4 - 37 4 g/dL    RDW 16 6 (H) 11 6 - 15 1 %    MPV 10 8 8 9 - 12 7 fL    Platelets 878 (L) 898 - 390 Thousands/uL    nRBC 0 /100 WBCs    Neutrophils Relative 72 43 - 75 %    Immat GRANS % 1 0 - 2 %    Lymphocytes Relative 18 14 - 44 %    Monocytes Relative 8 4 - 12 %    Eosinophils Relative 0 0 - 6 %    Basophils Relative 1 0 - 1 %    Neutrophils Absolute 6 39 1 85 - 7 62 Thousands/µL    Immature Grans Absolute 0 04 0 00 - 0 20 Thousand/uL    Lymphocytes Absolute 1 54 0 60 - 4 47 Thousands/µL    Monocytes Absolute 0 70 0 17 - 1 22 Thousand/µL    Eosinophils Absolute 0 03 0 00 - 0 61 Thousand/µL    Basophils Absolute 0 04 0 00 - 0 10 Thousands/µL   APTT    Collection Time: 01/08/19 12:50 PM   Result Value Ref Range    PTT 55 (H) 26 - 38 seconds   Fingerstick Glucose (POCT)    Collection Time: 01/08/19  2:40 PM   Result Value Ref Range    POC Glucose 449 (H) 65 - 140 mg/dl   Fingerstick Glucose (POCT)    Collection Time: 01/08/19  3:40 PM   Result Value Ref Range    POC Glucose 469 (H) 65 - 140 mg/dl   Fingerstick Glucose (POCT)    Collection Time: 01/08/19  7:50 PM   Result Value Ref Range    POC Glucose 381 (H) 65 - 140 mg/dl   APTT    Collection Time: 01/08/19  7:53 PM   Result Value Ref Range    PTT 59 (H) 26 - 38 seconds   Basic metabolic panel    Collection Time: 01/08/19  7:54 PM   Result Value Ref Range Sodium 125 (L) 136 - 145 mmol/L    Potassium 4 8 3 5 - 5 3 mmol/L    Chloride 85 (L) 100 - 108 mmol/L    CO2 36 (H) 21 - 32 mmol/L    ANION GAP 4 4 - 13 mmol/L    BUN 36 (H) 5 - 25 mg/dL    Creatinine 4 03 (H) 0 60 - 1 30 mg/dL    Glucose 356 (H) 65 - 140 mg/dL    Calcium 9 0 8 3 - 10 1 mg/dL    eGFR 14 ml/min/1 73sq m   CBC    Collection Time: 01/08/19  7:54 PM   Result Value Ref Range    WBC 9 64 4 31 - 10 16 Thousand/uL    RBC 3 90 3 88 - 5 62 Million/uL    Hemoglobin 10 0 (L) 12 0 - 17 0 g/dL    Hematocrit 32 6 (L) 36 5 - 49 3 %    MCV 84 82 - 98 fL    MCH 25 6 (L) 26 8 - 34 3 pg    MCHC 30 7 (L) 31 4 - 37 4 g/dL    RDW 16 4 (H) 11 6 - 15 1 %    Platelets 689 (L) 099 - 390 Thousands/uL    MPV 12 0 8 9 - 12 7 fL   Fingerstick Glucose (POCT)    Collection Time: 01/08/19  9:10 PM   Result Value Ref Range    POC Glucose 397 (H) 65 - 140 mg/dl   APTT    Collection Time: 01/09/19  3:55 AM   Result Value Ref Range    PTT 44 (H) 26 - 38 seconds   Basic metabolic panel    Collection Time: 01/09/19  3:55 AM   Result Value Ref Range    Sodium 127 (L) 136 - 145 mmol/L    Potassium 4 5 3 5 - 5 3 mmol/L    Chloride 88 (L) 100 - 108 mmol/L    CO2 33 (H) 21 - 32 mmol/L    ANION GAP 6 4 - 13 mmol/L    BUN 37 (H) 5 - 25 mg/dL    Creatinine 4 19 (H) 0 60 - 1 30 mg/dL    Glucose 212 (H) 65 - 140 mg/dL    Calcium 9 0 8 3 - 10 1 mg/dL    eGFR 13 ml/min/1 73sq m   Rapid HIV 1/2 AB-AG Combo    Collection Time: 01/09/19  3:55 AM   Result Value Ref Range    Rapid HIV 1 AND 2 Non-Reactive Non-Reactive    HIV-1 P24 Ag Screen Non-Reactive Non-Reactive   CBC and differential    Collection Time: 01/09/19  3:56 AM   Result Value Ref Range    WBC 9 61 4 31 - 10 16 Thousand/uL    RBC 3 68 (L) 3 88 - 5 62 Million/uL    Hemoglobin 9 6 (L) 12 0 - 17 0 g/dL    Hematocrit 30 7 (L) 36 5 - 49 3 %    MCV 83 82 - 98 fL    MCH 26 1 (L) 26 8 - 34 3 pg    MCHC 31 3 (L) 31 4 - 37 4 g/dL    RDW 16 3 (H) 11 6 - 15 1 %    MPV 11 5 8 9 - 12 7 fL    Platelets 786 (L) 149 - 390 Thousands/uL    nRBC 0 /100 WBCs    Neutrophils Relative 74 43 - 75 %    Immat GRANS % 0 0 - 2 %    Lymphocytes Relative 17 14 - 44 %    Monocytes Relative 8 4 - 12 %    Eosinophils Relative 1 0 - 6 %    Basophils Relative 0 0 - 1 %    Neutrophils Absolute 7 07 1 85 - 7 62 Thousands/µL    Immature Grans Absolute 0 03 0 00 - 0 20 Thousand/uL    Lymphocytes Absolute 1 60 0 60 - 4 47 Thousands/µL    Monocytes Absolute 0 79 0 17 - 1 22 Thousand/µL    Eosinophils Absolute 0 08 0 00 - 0 61 Thousand/µL    Basophils Absolute 0 04 0 00 - 0 10 Thousands/µL   Fingerstick Glucose (POCT)    Collection Time: 19  6:15 AM   Result Value Ref Range    POC Glucose 246 (H) 65 - 140 mg/dl     [phone]     [facility]     Invasive Cardiovascular Lab Complete Report     Patient: Jerrell Leventhal  MR number: [id number]  Account number: [de-identified]  Study date: 2019  Gender: Male  : 1945  Height: 65 in  Weight: 165 lb  BSA: 1 82 m squared     Allergies: NO KNOWN ALLERGIES     Interventional Cardiologist:  Neelima Salgado MD     SUMMARY     CORONARY CIRCULATION:  Distal left main: There was a tubular 50 % stenosis  Proximal LAD: There was a tubular 90 % stenosis  2nd diagonal: The vessel was small sized  Angiography showed minor luminal irregularities  Ostial circumflex: The vessel was large sized (dominant)  2nd obtuse marginal: The vessel was small sized  Angiography showed mild atherosclerosis  3rd obtuse marginal: The vessel was small sized  Angiography showed mild atherosclerosis    Left posterior descending artery: There was a 90 % stenosis      HEMODYNAMICS:  Hemodynamic assessment demonstrated mildly to moderately elevated LVEDP      RECOMMENDATIONS:  Consultation with a cardiac surgeon should be obtained for coronary artery bypass grafting      INDICATIONS:  --  Possible CAD: myocardial infarction without ST elevation (NSTEMI)  --  Congestive heart failure with cardiomyopathy  --  Cardiac: dyspnea and chest pain      PROCEDURES PERFORMED    [address] [address] [phone]     Transthoracic Echocardiogram  Limited 2D, Doppler, and Color Doppler     Study date:  2019     Patient: Stacy Sanchez  MR number: [id number]  Account number: [de-identified]  : 1945  Age: 72 years  Gender: Male  Status: Inpatient  Location: Bedside  Height: 65 in  Weight: 167 lb  BP: 168/ 77 mmHg     Indications: Cardiomyopathy     Diagnoses: I42 9 - Cardiomyopathy, unspecified     Sonographer:  Jaye Muir RDCS  Referring Physician:  Abelardo Padilla PA-C  Group:  [facility]  Interpreting Physician:  Siddharth Quintanilla MD     SUMMARY     LEFT VENTRICLE:  Systolic function was normal  Ejection fraction was estimated to be 35 %  There was severe diffuse hypokinesis      RIGHT VENTRICLE:  The ventricle was mildly dilated      MITRAL VALVE:  There was moderate regurgitation      AORTIC VALVE:  The valve was trileaflet  Leaflets exhibited mild calcification and normal cuspal separation      TRICUSPID VALVE:  There was mild regurgitation  Estimated peak PA pressure was 60 mmHg      HISTORY: PRIOR HISTORY: Myocardial infarction  Congestive heart failure  Cardiomyopathy  Risk factors: CKD, chest pain, hypertension, diabetes, and hypercholesterolemia      PROCEDURE: The procedure was performed at the bedside  This was a routine study  The transthoracic approach was used  The study included limited 2D imaging, limited spectral Doppler, and color Doppler  The heart rate was 98 bpm, at the  start of the study  Images were obtained from the parasternal, apical, and subcostal acoustic windows  Image quality was adequate      LEFT VENTRICLE: Size was normal  Systolic function was normal  Ejection fraction was estimated to be 35 %   There was severe diffuse hypokinesis  Wall thickness was normal  DOPPLER: Left ventricular diastolic function parameters were  abnormal      RIGHT VENTRICLE: The ventricle was mildly dilated  Systolic function was normal  Wall thickness was normal      LEFT ATRIUM: Size was normal      RIGHT ATRIUM: Size was normal      MITRAL VALVE: Valve structure was normal  There was normal leaflet separation  DOPPLER: The transmitral velocity was within the normal range  There was no evidence for stenosis  There was moderate regurgitation      AORTIC VALVE: The valve was trileaflet  Leaflets exhibited mild calcification and normal cuspal separation  The valve was not well visualized  DOPPLER: Transaortic velocity was within the normal range  There was no evidence for stenosis  There was no regurgitation      TRICUSPID VALVE: The valve structure was normal  There was normal leaflet separation  DOPPLER: The transtricuspid velocity was within the normal range  There was no evidence for stenosis  There was mild regurgitation  Estimated peak PA  pressure was 60 mmHg      PULMONIC VALVE: Leaflets exhibited normal thickness, no calcification, and normal cuspal separation  DOPPLER: The transpulmonic velocity was within the normal range  There was no regurgitation      PERICARDIUM: There was no pericardial effusion  The pericardium was normal in appearance      AORTA: The root exhibited normal size      SYSTEMIC VEINS: IVC: The inferior vena cava was normal in size   Respirophasic changes were normal      SYSTEM MEASUREMENT TABLES     2D  %FS: 13 2 %  Ao Diam: 3 cm  EDV(Teich): 119 1 ml  EF Biplane: 26 3 %  EF(Teich): 28 3 %  ESV(Teich): 85 4 ml  IVSd: 1 cm  LA Area: 19 5 cm2  LA Diam: 4 4 cm  LVEDV MOD A2C: 89 4 ml  LVEDV MOD A4C: 81 6 ml  LVEDV MOD BP: 86 1 ml  LVEF MOD A2C: 15 9 %  LVEF MOD A4C: 36 4 %  LVESV MOD A2C: 75 2 ml  LVESV MOD A4C: 51 9 ml  LVESV MOD BP: 63 5 ml  LVIDd: 5 cm  LVIDs: 4 4 cm  LVLd A2C: 6 9 cm  LVLd A4C: 6 8 cm  LVLs A2C: 6 5 cm  LVLs A4C: 6 3 cm  LVPWd: 0 9 cm  RA Area: 20 8 cm2  RVIDd: 4 4 cm  SV MOD A2C: 14 2 ml  SV MOD A4C: 29 7 ml  SV(Teich): 33 7 ml     CF  MR Als  [place]: 0 3 m/s  MR Flow: 98 2 ml/s  MR Rad: 0 7 cm     CW  MR VTI: 139 7 cm  MR VTI: 130 1 cm  MR Vmax: 5 m/s  MR Vmax: 5 m/s  MR Vmean: 4 m/s  MR maxP 5 mmHg  MR meanP 2 mmHg  TR Vmax: 4 m/s  TR maxP 7 mmHg     MM  TAPSE: 1 3 cm     PW  E': 0 m/s  MR ERO: 0 2 cm2  MR RV: 27 5 ml     [employer] Accredited Echocardiography Laboratory     Prepared and electronically signed by  Fernando Davila MD  Signed 2019 16:48:29    Imaging: I have personally reviewed pertinent reports  Tele- NSR  VTE Pharmacologic Prophylaxis: Heparin  VTE Mechanical Prophylaxis: sequential compression device    Counseling / Coordination of Care  Total time spent today 40 minutes  Greater than 50% of total time was spent with the patient and / or family counseling and / or coordination of care

## 2019-01-09 NOTE — ASSESSMENT & PLAN NOTE
With multivessel coronary artery disease seen on cardiac catheterization  Patient transferred for CT surgery evaluation  Will consult  Continue IV heparin for now  Continue aspirin, statin and metoprolol  Will increase atorvastatin from 10 to 40  Patient currently asymptomatic

## 2019-01-09 NOTE — PROGRESS NOTES
NEPHROLOGY PROGRESS NOTE   Misty Neville 72 y o  male MRN: [id number]  Unit/Bed#: [facility] 403-01 Encounter: [id number]  Reason for Consult: NATALIIA/CKD    Mr Misty Neville S a 28-year-old gentleman with a past medical history of hypertension, hyperlipidemia, uncontrolled diabetes, CHF and Chronic Kidney Disease IV since 2016 through Care everywhere, with unknown baseline who initially presented to [facility] with worsening shortness of breath lower extremity edema and orthopnea  Of note he was recently seen at [facility] with CHF exacerbation was discharged on torsemide  He was initiated on hemodialysis on 01/04/2019 via right PermCath placed by IR  Etiology of Chronic Kidney Disease likely secondary to uncontrolled diabetes, hypertension and cardiorenal syndrome  The patient underwent cardiac catheterization yesterday and was found to have multivessel disease and now transferred to [facility] for CABG evaluation  Nephrology has been consulted for ongoing management of his AK I on top of Chronic Kidney Disease IV, now HD dependent    ASSESSMENT/PLAN:  1 Acute kidney injury (POA):  On top of Chronic Kidney Disease IV, with unknown baseline and likely secondary to cardiorenal  -per Care everywhere, Chronic Kidney Disease four since 2016-may be progressive  -currently dialysis dependent started on 01/04/2019 at [facility]  -patient currently on a Monday Wednesday Friday schedule  -per hospital records he has been approved for [facility] every TTS schedule on discharge  -patient for hemodialysis today  -patient previously followed [facility] for Healthcare needs  -avoid hypotension  -avoid nephrotoxins on 01/04/2019  -will continue to trend I/O, lab values in volume status    2   Chronic kidney disease IV:  Per Care everywhere note dating back to 2016, however baseline not documented  -patient unaware of baseline but is aware he has had worsening kidney function for several years  -suspect etiology secondary to uncontrolled diabetes, hyperlipidemia, and cardiorenal syndrome    3  Acute on chronic systolic congestive heart failure:  Cardiology following  -currently on IV diuretics 40 mg IV b i d   -echocardiogram 01/03/2019:  Reports EF of 35% with severe diffuse hypokinesis, PA pressure is 60mmHG, IVC was normal size    4  Hyponatremia:  Suspect of volume mediated with volume overload  -will treat with UF with HD  -hyperglycemia also playing a role    5  Hypertension:  BP acceptable at 140 to 150s currently  -currently on metoprolol 25 mg every 12 hours  -home medication list has been reviewed    6  Anemia:  Likely of Chronic Kidney Disease  -hemoglobin stable 9 6-10  - iron stores from 01/02/2019: iron 42, ferritin 208, iron saturation 16, TIBC 267  -currently not on iron supplements-consider IV with HD versus oral; will discuss with Dr Prince Boles  -patient currently on Epogen 5000 units with HD treatments  -trend for now    7  Bone mineral disease of Chronic Kidney Disease:  Patient currently on PhosLo 667 mg t i d  With meals  -last phosphorus level 01/02/2019 was 5 1  - 2 from 01/02/2019:    -will repeat phosphorus since started on PhosLo    8  Multivessel disease:  Status post cardiac catheterization at [facility] 1/8/2019  -transferred to [facility] for CABG evaluation  -CT surgery consult pending    9  Uncontrolled diabetes:  Consider endocrinology consult      SUBJECTIVE:  Patient seen and examined  Denies chest pain or shortness of Breath  Reports being transferred to [facility] for open heart surgery evaluation       OBJECTIVE:  Current Weight: Weight - Scale: 62 8 kg (138 lb 8 oz)  Vitals:    01/08/19 2342 01/09/19 0322 01/09/19 0500 01/09/19 0703   BP: 147/84 145/71  158/81   BP Location: Right arm Right arm  Right arm   Pulse: 77 82  87   Resp: 18 18  20   Temp: 98 1 °F (36 7 °C) 97 8 °F (36 6 °C)  97 9 °F (36 6 °C) TempSrc: Oral Oral  Oral   SpO2: 100% 100%  100%   Weight:   62 8 kg (138 lb 8 oz)        Intake/Output Summary (Last 24 hours) at 01/09/19 1001  Last data filed at 01/09/19 4313   Gross per 24 hour   Intake           570 25 ml   Output              550 ml   Net            20 25 ml     General:  No acute distress, cooperative, laying flat  Skin:  Warm and dry without rash  HEENT:  Mucous membranes moist, sclera anicteric  Neck:  Supple, no JVD appreciated  Chest:  Fine few crackles in the bases bilaterally, no wheezes or rhonchi  Heart:  Regular rate and rhythm, no rub  Abdomen:  Soft, nontender, no distension, audible bowel sounds  Extremities:  Some edema bilaterally noted  Neuro:  Alert and awake  Psych:  Appropriate affect    Medications:    Current Facility-Administered Medications:     acetaminophen (TYLENOL) tablet 650 mg, 650 mg, Oral, Q6H PRN, Elvia Camacho MD    aspirin chewable tablet 81 mg, 81 mg, Oral, Daily, Elvia Camacho MD    atorvastatin (LIPITOR) tablet 40 mg, 40 mg, Oral, Daily With Mani Herbert MD    b complex-vitamin C-folic acid (NEPHROCAPS) capsule 1 capsule, 1 capsule, Oral, Daily With Mani Herbert MD    benzonatate (TESSALON PERLES) capsule 100 mg, 100 mg, Oral, TID, Elvia Camacho MD, 100 mg at 01/09/19 0856    calcium acetate (PHOSLO) capsule 667 mg, 667 mg, Oral, TID With Meals, Elvia Camacho MD, 667 mg at 01/09/19 0856    epoetin deepa (EPOGEN,PROCRIT) injection 2,000 Units, 2,000 Units, Intravenous, After Dialysis **AND** epoetin deepa (EPOGEN,PROCRIT) injection 3,000 Units, 3,000 Units, Intravenous, After Dialysis, Elvia Camacho MD    furosemide (LASIX) tablet 40 mg, 40 mg, Oral, BID (diuretic), Elvia Camacho MD, 40 mg at 01/09/19 0856    heparin (porcine) 25,000 units in 250 mL infusion (premix), 3-20 Units/kg/hr (Order-Specific), Intravenous, Titrated, Elvia Camacho MD, Last Rate: 9 8 mL/hr at 01/09/19 0720, 13 Units/kg/hr at 01/09/19 0720    heparin (porcine) injection 2,000 Units, 2,000 Units, Intravenous, PRN, Brandon Huff MD, 2,000 Units at 01/09/19 0432    heparin (porcine) injection 4,000 Units, 4,000 Units, Intravenous, PRN, Brandon Huff MD    insulin glargine (LANTUS) subcutaneous injection 15 Units 0 15 mL, 15 Units, Subcutaneous, HS, Brandon Huff MD, 15 Units at 01/08/19 2124    insulin lispro (HumaLOG) 100 units/mL subcutaneous injection 1-5 Units, 1-5 Units, Subcutaneous, HS, Brandon Huff MD, 5 Units at 01/08/19 2344    insulin lispro (HumaLOG) 100 units/mL subcutaneous injection 1-6 Units, 1-6 Units, Subcutaneous, TID AC, 3 Units at 01/09/19 2578 **AND** Fingerstick Glucose (POCT), , , TID AC, Brandon Huff MD    insulin lispro (HumaLOG) 100 units/mL subcutaneous injection 3 Units, 3 Units, Subcutaneous, TID With Meals, Brandon Huff MD, 3 Units at 01/09/19 0856    melatonin tablet 3 mg, 3 mg, Oral, HS, Brandon Huff MD, 3 mg at 01/08/19 2125    metoprolol tartrate (LOPRESSOR) tablet 25 mg, 25 mg, Oral, Q12H [address], Brandon Huff MD, 25 mg at 01/09/19 0856    ondansetron (ZOFRAN) injection 4 mg, 4 mg, Intravenous, Q6H PRN, Brandon Huff MD    pantoprazole (PROTONIX) EC tablet 40 mg, 40 mg, Oral, Early Morning, Brandon Huff MD, 40 mg at 01/09/19 3916    Laboratory Results:    Results from last 7 days  Lab Units 01/09/19  0356 01/09/19  0355 01/08/19  1954 01/08/19  0449 01/07/19  0624 01/06/19  1502 01/06/19  0510 01/05/19  0535 01/04/19  0450   WBC Thousand/uL 9 61  --  9 64 8 74 10 27* 8 81 7 97 8 32 8 70   HEMOGLOBIN g/dL 9 6*  --  10 0* 10 0* 9 6* 10 6* 10 0* 10 2* 10 0*   HEMATOCRIT % 30 7*  --  32 6* 32 0* 30 6* 34 0* 32 5* 32 6* 30 7*   PLATELETS Thousands/uL 125*  --  132* 104* 149 148* 147* 186 214   POTASSIUM mmol/L  --  4 5 4 8 5 1 4 6  --  4 3 4 5 4 6   CHLORIDE mmol/L  --  88* 85* 92* 91*  --  95* 96* 92*   CO2 mmol/L --  33* 36* 35* 36*  --  32 33* 32   BUN mg/dL  --  37* 36* 28* 38*  --  25 32* 60*   CREATININE mg/dL  --  4 19* 4 03* 3 48* 4 21*  --  3 25* 3 32* 5 06*   CALCIUM mg/dL  --  9 0 9 0 8 7 9 3  --  8 1* 8 9 9 5

## 2019-01-09 NOTE — PLAN OF CARE
CARDIOVASCULAR - ADULT     Maintains optimal cardiac output and hemodynamic stability Progressing     Absence of cardiac dysrhythmias or at baseline rhythm Progressing        GENITOURINARY - ADULT     Maintains or returns to baseline urinary function Progressing     Absence of urinary retention Progressing     Urinary catheter remains patent Progressing        HEMATOLOGIC - ADULT     Maintains hematologic stability Progressing        METABOLIC, FLUID AND ELECTROLYTES - ADULT     Electrolytes maintained within normal limits Progressing     Fluid balance maintained Progressing     Glucose maintained within target range Progressing

## 2019-01-09 NOTE — H&P
H&P- Abdulkadir Melgoza 1945, 72 y o  male MRN: [id number]    Unit/Bed#: [facility] 403-01 Encounter: [id number]    Primary Care Provider: No primary care provider on file  Date and time admitted to hospital: 1/8/2019  6:05 PM        * MI, acute, non ST segment elevation (HCC)   Assessment & Plan    With multivessel coronary artery disease seen on cardiac catheterization  Patient transferred for CT surgery evaluation  Will consult  Continue IV heparin for now  Continue aspirin, statin and metoprolol  Will increase atorvastatin from 10 to 40  Patient currently asymptomatic  Acute on chronic congestive heart failure [facility])   Assessment & Plan    Patient said that he had recent admission at [facility] for congestive heart failure  Cardiomyopathy most likely ischemic   EF 35%  on echocardiogram   Patient currently on p o  Lasix but volume status currently being managed with dialysis  Appears euvolemic currently  Consult Cardiology  Dysphagia   Assessment & Plan    Patient reported that he has been having only liquid since last 6 months  Part of it is because he does not have any taste and also admitted to having trouble swallowing  He said he has lost approximately 30 lb in last 6 months  ? Due to CHF treatment  Continue to monitor dietary intake  If continues to be low consider GI evaluation  Type 2 diabetes mellitus with kidney complication, with long-term current use of insulin [facility])   Assessment & Plan    Lab Results   Component Value Date    HGBA1C 8 2 (H) 01/02/2019       Recent Labs      01/08/19   1440  01/08/19   1540   POCGLU  449*  469*       Blood Sugar Average: Last 72 hrs:     Blood glucose significantly elevated in 400s  Will check stat Accucheck  Patient currently on sliding scale only  Will start the patient on basal bolus regimen  Lantus 15 units with Humalog 3 units t i d  Plus sliding scale       NATALIIA (acute kidney injury) [facility])   Assessment & Plan    Baseline creatinine unknown  Patient had significantly elevated creatinine on admission at [facility]  Was evaluated by Nephrology  Because of worsening renal function had to be started on dialysis at [facility] on 1/4/2019  Will consult Nephrology for dialysis  VTE Prophylaxis: Heparin  / sequential compression device   Code Status: full  POLST: POLST form is not discussed and not completed at this time  Discussion with family: patient    Anticipated Length of Stay:  Patient will be admitted on an Inpatient basis with an anticipated length of stay of  > 2 midnights  Justification for Hospital Stay: above    Total Time for Visit, including Counseling / Coordination of Care: 45 minutes  Greater than 50% of this total time spent on direct patient counseling and coordination of care  Chief Complaint:   Transferred from [facility] for CT surgery evaluation for multivessel coronary artery disease  Presented there with worsening shortness of breath  History of Present Illness:    Elias Andrade is a 72 y o  male who as a transfer from [facility] for CT surgery evaluation for multivessel coronary artery disease  He presented there due to worsening shortness of breath, lower extremity edema and orthopnea  He said he had recent hospitalization prior to this said [facility] for congestive heart failure  Was discharged on diuretics but his symptoms did not improve  On admission there his creatinine was noted to be significantly elevated with elevated troponin  His symptoms were most likely from congestive heart failure and possible NSTEMI  He was started on IV diuretics  Because of his worsening creatinine despite diuresis, patient was started on hemodialysis 1/14/2019  After this patient underwent cardiac catheterization that showed multivessel coronary artery disease    So it was recommended to transfer the patient here for CT surgery evaluation for CABG     Currently patient denies any complaints  No chest pain or shortness of breath  Says that the swelling in his legs has almost resolved  Review of Systems:    Review of Systems   Constitutional: Positive for appetite change and unexpected weight change  HENT: Negative for congestion and sore throat  Respiratory: Negative for cough, chest tightness and shortness of breath  Cardiovascular: Negative for chest pain, palpitations and leg swelling  Gastrointestinal: Negative for abdominal pain, nausea and vomiting  Genitourinary: Negative for dysuria, flank pain and urgency  Musculoskeletal: Negative for arthralgias and myalgias  Skin: Negative for rash  Neurological: Negative for dizziness and headaches  Psychiatric/Behavioral: Negative for agitation and behavioral problems  Past Medical and Surgical History:     Past Medical History:   Diagnosis Date    [facility]est[facility] [address] [facility]     [facility]     Dysphagia     Hyperlipidemia     Hypertension     Myocardial infarction [facility])        Past Surgical History:   Procedure Laterality Date    CATARACT EXTRACTION      [facility]  1/4/2019       Meds/Allergies:    Prior to Admission medications    Medication Sig Start Date End Date Taking?  Authorizing Provider   aspirin 81 mg chewable tablet Chew 81 mg daily    Historical Provider, MD   atorvastatin (LIPITOR) 10 mg tablet Take 10 mg by mouth    Historical Provider, MD   benzonatate (TESSALON PERLES) 100 mg capsule Take 100 mg by mouth    Historical Provider, MD   METOPROLOL SUCCINATE PO Take 500 mg by mouth    Historical Provider, MD   pantoprazole (PROTONIX) 40 mg tablet Take 40 mg by mouth    Historical Provider, MD   sodium bicarbonate 325 MG tablet Take 325 mg by mouth    Historical Provider, MD   torsemide (DEMADEX) 20 mg tablet Take 20 mg by mouth    Historical Provider, MD       Allergies: No Known Allergies    Social History:     Marital Status: /Civil Union     Substance Use History:   History   Alcohol Use No     History   Smoking Status    Former Smoker   Smokeless Tobacco    Never Used     History   Drug Use No       Family History:    No family history on file  Physical Exam:     Vitals:   Blood Pressure: 157/79 (01/08/19 1904)  Pulse: 85 (01/08/19 1904)  Temperature: 97 6 °F (36 4 °C) (01/08/19 1904)  Temp Source: Oral (01/08/19 1904)  Respirations: 18 (01/08/19 1904)  SpO2: 100 % (01/08/19 1904)    Physical Exam    Constitutional: Pt appears well-developed and poorly-nourished  Not in any acute distress  HENT:   Head: Normocephalic and atraumatic  Eyes: EOM are normal    Neck: Neck supple  Cardiovascular: Normal rate, regular rhythm, normal heart sounds  Exam reveals no gallop and no friction rub  No murmur heard  No peripheral edema  Pulmonary/Chest: Effort normal and breath sounds normal  No respiratory distress  Pt has no wheezes or rales  Abdominal: Soft  Non-distended, Non-tender  Bowel sounds are normal    Musculoskeletal: Normal range of motion  Neurological: alert and oriented to person, place, and time  Normal strength and sensations  Psychiatric: normal mood and affect  Additional Data:     Lab Results: I have personally reviewed pertinent reports          Results from last 7 days  Lab Units 01/08/19  0449   WBC Thousand/uL 8 74   HEMOGLOBIN g/dL 10 0*   HEMATOCRIT % 32 0*   PLATELETS Thousands/uL 104*   NEUTROS PCT % 72   LYMPHS PCT % 18   MONOS PCT % 8   EOS PCT % 0       Results from last 7 days  Lab Units 01/08/19  0449  01/06/19  0510   SODIUM mmol/L 129*  < > 132*   POTASSIUM mmol/L 5 1  < > 4 3   CHLORIDE mmol/L 92*  < > 95*   CO2 mmol/L 35*  < > 32   BUN mg/dL 28*  < > 25   CREATININE mg/dL 3 48*  < > 3 25*   ANION GAP mmol/L 2*  < > 5   CALCIUM mg/dL 8 7  < > 8 1*   ALBUMIN g/dL  --   --  2 4*   TOTAL BILIRUBIN mg/dL  --   --  0 40   ALK PHOS U/L  --   --  61   ALT U/L  --   --  20   AST U/L  -- --  14   GLUCOSE RANDOM mg/dL 448*  < > 385*   < > = values in this interval not displayed  Results from last 7 days  Lab Units 01/06/19  1502   INR  1 09       Results from last 7 days  Lab Units 01/08/19  1540 01/08/19  1440   POC GLUCOSE mg/dl 469* 449*       Results from last 7 days  Lab Units 01/02/19  0432   HEMOGLOBIN A1C % 8 2*           Imaging: I have personally reviewed pertinent reports  No orders to display       EKG, Pathology, and Other Studies Reviewed on Admission:   · EKG:     Allscripts / Epic Records Reviewed: Yes     ** Please Note: This note has been constructed using a voice recognition system   **

## 2019-01-09 NOTE — PLAN OF CARE
CARDIOVASCULAR - ADULT     Maintains optimal cardiac output and hemodynamic stability Progressing     Absence of cardiac dysrhythmias or at baseline rhythm Progressing        DISCHARGE PLANNING     Discharge to home or other facility with appropriate resources Progressing        GENITOURINARY - ADULT     Maintains or returns to baseline urinary function Progressing     Absence of urinary retention Progressing     Urinary catheter remains patent Progressing        HEMATOLOGIC - ADULT     Maintains hematologic stability Progressing        INFECTION - ADULT     Absence or prevention of progression during hospitalization Progressing        Knowledge Deficit     Patient/family/caregiver demonstrates understanding of disease process, treatment plan, medications, and discharge instructions Progressing        METABOLIC, FLUID AND ELECTROLYTES - ADULT     Electrolytes maintained within normal limits Progressing     Fluid balance maintained Progressing     Glucose maintained within target range Progressing        PAIN - ADULT     Verbalizes/displays adequate comfort level or baseline comfort level Progressing        Potential for Falls     Patient will remain free of falls Progressing        SAFETY ADULT     Maintain or return to baseline ADL function Progressing     Maintain or return mobility status to optimal level Progressing

## 2019-01-09 NOTE — PROGRESS NOTES
Aspirin not given this am, pt going for dialysis and will be dialyzed out as per claudia holder from [facility]; re timed to 1700 tonight

## 2019-01-09 NOTE — ASSESSMENT & PLAN NOTE
Patient said that he had recent admission at [facility] for congestive heart failure  Cardiomyopathy most likely ischemic   EF 35%  on echocardiogram   Patient currently on p o  Lasix but volume status currently being managed with dialysis  Appears euvolemic currently  Consult Cardiology

## 2019-01-09 NOTE — HEMODIALYSIS
Please became hypotensive 91/51, 2 hrs 15 minutes into treatment, 300 nss bolus given ,Dr Rayne Vazquez present , tx ended , reinfused  Repeat bp 130/74    Removed 1911 ml NSS

## 2019-01-09 NOTE — CONSULTS
Consultation - Misty Neville 72 y o  male MRN: [id number]    Unit/Bed#: [facility] 403-01 Encounter: [id number]      Assessment/Plan     Assessment/Plan:  Type 2 diabetes with hyperglycemia:  Lantus has been increased to 20 units q h s  And Humalog to 8 units a c   Agree with this increase and continue scale for correction  Continue to monitor and make changes as needed  Monitor for hypoglycemia  CAD:  Workup underway by Cardiology and cardiac surgery  CKD:  Receiving dialysis per Nephrology    CC: Diabetes Consult    History of Present Illness     HPI: Misty Neville is a 72y o  year old male with type 2 diabetes for about 40 years initially treated with p  O  Meds and eventually transition to insulin  He states he believes he is on Humulin insulin takes it once or twice per day  Diabetes complicated by CKD, retinopathy, peripheral neuropathy, hypertension, hyperlipidemia, coronary artery disease  He is receiving hemodialysis per Nephrology  He presented to [facility] with shortness of breath and lower extremity edema as well as orthopnea is currently being evaluated at [facility] for cardiac surgical evaluation  Reports poor appetite  He is seen in dialysis today  Consults    Review of Systems   Constitutional: Positive for appetite change (poor)  Negative for fever  HENT: Negative for congestion and trouble swallowing  Eyes: Negative for visual disturbance  Respiratory: Negative for shortness of breath  Cardiovascular: Negative for leg swelling  Gastrointestinal: Negative for abdominal pain, nausea and vomiting  Endocrine: Negative for cold intolerance, heat intolerance, polydipsia and polyuria  Genitourinary: Negative for difficulty urinating and frequency  Musculoskeletal: Negative for arthralgias  Skin: Negative for rash  Neurological: Negative for dizziness and weakness  Psychiatric/Behavioral: Negative for agitation and confusion         Historical Information   Past Medical History:   Diagnosis Date    [facility] [address] [facility]     Diabetes mellitus [facility] [address] [facility]     Dysphagia     Hyperlipidemia     Hypertension     Myocardial infarction [facility])      Past Surgical History:   Procedure Laterality Date    CATARACT EXTRACTION      LEEANN JAIME HSPTL PLACEMENT  1/4/2019     Social History   History   Alcohol Use No     History   Drug Use No     History   Smoking Status    Former Smoker   Smokeless Tobacco    Never Used     Family History: No family history on file      Meds/Allergies   Current Facility-Administered Medications   Medication Dose Route Frequency Provider Last Rate Last Dose    acetaminophen (TYLENOL) tablet 650 mg  650 mg Oral Q6H PRN Jenna Perez MD        aspirin chewable tablet 81 mg  81 mg Oral Daily Jenna Perez MD        atorvastatin (LIPITOR) tablet 40 mg  40 mg Oral Daily With Reno Saunders MD        b complex-vitamin C-folic acid (NEPHROCAPS) capsule 1 capsule  1 capsule Oral Daily With Reno Saunders MD        benzonatate (TESSALON PERLES) capsule 100 mg  100 mg Oral TID Jenna Perez MD   100 mg at 01/09/19 0856    calcium acetate (PHOSLO) capsule 667 mg  667 mg Oral TID With Meals Jenna Perez MD   667 mg at 01/09/19 1148    epoetin deepa (EPOGEN,PROCRIT) injection 2,000 Units  2,000 Units Intravenous After Dialysis Jenna Perez MD   2,000 Units at 01/09/19 1419    And    epoetin deepa (EPOGEN,PROCRIT) injection 3,000 Units  3,000 Units Intravenous After Dialysis Jenna Perez MD   3,000 Units at 01/09/19 1420    furosemide (LASIX) tablet 40 mg  40 mg Oral BID (diuretic) Jenna Perez MD   40 mg at 01/09/19 0856    heparin (porcine) 25,000 units in 250 mL infusion (premix)  3-20 Units/kg/hr (Order-Specific) Intravenous Titrated Jenna Perez MD 8 3 mL/hr at 01/09/19 1215 11 Units/kg/hr at 01/09/19 1215    heparin (porcine) injection 2,000 Units 2,000 Units Intravenous PRN Sandra Herring MD   2,000 Units at 01/09/19 0432    heparin (porcine) injection 4,000 Units  4,000 Units Intravenous PRN Sandra Herring MD        insulin glargine (LANTUS) subcutaneous injection 20 Units 0 2 mL  20 Units Subcutaneous HS Aleshia Mathur MD        insulin lispro (HumaLOG) 100 units/mL subcutaneous injection 1-5 Units  1-5 Units Subcutaneous HS Sandra Herring MD   5 Units at 01/08/19 2344    insulin lispro (HumaLOG) 100 units/mL subcutaneous injection 1-6 Units  1-6 Units Subcutaneous TID AC Sandra Herring MD   4 Units at 01/09/19 1149    insulin lispro (HumaLOG) 100 units/mL subcutaneous injection 8 Units  8 Units Subcutaneous TID With Meals Aleshia Mathur MD   8 Units at 01/09/19 1149    melatonin tablet 3 mg  3 mg Oral HS Sandra Herring MD   3 mg at 01/08/19 2125    metoprolol tartrate (LOPRESSOR) tablet 25 mg  25 mg Oral Q12H Felecia Beth MD   25 mg at 01/09/19 0856    mupirocin (BACTROBAN) 2 % nasal ointment 1 application  1 application Nasal [id number] [address]   1 application at 33/07/58 1309    ondansetron (ZOFRAN) injection 4 mg  4 mg Intravenous Q6H PRN Sandra Herring MD        pantoprazole (PROTONIX) EC tablet 40 mg  40 mg Oral Early Morning Sandra Herring MD   40 mg at 01/09/19 2640     No Known Allergies    Objective   Vitals: Blood pressure 138/75, pulse 80, temperature (!) 96 7 °F (35 9 °C), temperature source Tympanic, resp  rate 22, weight 62 8 kg (138 lb 8 oz), SpO2 100 %      Intake/Output Summary (Last 24 hours) at 01/09/19 1438  Last data filed at 01/09/19 1355   Gross per 24 hour   Intake           1011 5 ml   Output              550 ml   Net            461 5 ml     Invasive Devices     Peripheral Intravenous Line            Peripheral IV 01/05/19 Left Antecubital 3 days          Hemodialysis Catheter            Permanent HD Catheter  5 days                Physical Exam Constitutional: He is oriented to person, place, and time  He appears well-developed and well-nourished  No distress  HENT:   Head: Normocephalic and atraumatic  Eyes: Pupils are equal, round, and reactive to light  Conjunctivae are normal    Neck: Normal range of motion  Neck supple  Cardiovascular: Normal rate and regular rhythm  Pulmonary/Chest: Effort normal and breath sounds normal  No respiratory distress  Abdominal: Soft  Bowel sounds are normal  He exhibits no distension  Musculoskeletal: Normal range of motion  He exhibits no edema  Neurological: He is alert and oriented to person, place, and time  He exhibits normal muscle tone  Skin: Skin is warm and dry  No rash noted  He is not diaphoretic  Psychiatric: He has a normal mood and affect  His behavior is normal        The history was obtained from the review of the chart, patient  Lab Results:       Lab Results   Component Value Date    WBC 10 13 01/09/2019    HGB 9 2 (L) 01/09/2019    HCT 29 1 (L) 01/09/2019    MCV 82 01/09/2019     (L) 01/09/2019     Lab Results   Component Value Date/Time    BUN 37 (H) 01/09/2019 03:55 AM    K 4 5 01/09/2019 03:55 AM    CL 88 (L) 01/09/2019 03:55 AM    CO2 33 (H) 01/09/2019 03:55 AM    CREATININE 4 19 (H) 01/09/2019 03:55 AM    AST 14 01/06/2019 05:10 AM    ALT 20 01/06/2019 05:10 AM    ALB 2 4 (L) 01/06/2019 05:10 AM     No results for input(s): CHOL, HDL, LDL, TRIG, VLDL in the last 72 hours  No results found for: Magi Bonilla  POC Glucose (mg/dl)   Date Value   01/09/2019 273 (H)   01/09/2019 246 (H)   01/08/2019 397 (H)   01/08/2019 381 (H)   01/08/2019 469 (H)   01/08/2019 449 (H)   12/31/2018 181 (H)   12/31/2018 174 (H)       Imaging Studies: I have personally reviewed pertinent reports  X-ray chest 1 view portable [[id number]] Collected: 12/31/18 8577   Order Status: Completed Updated: 12/31/18 1339   Narrative:     CHEST     INDICATION:   chest pain   Cough    COMPARISON: Eluterio Dancer PERFORMED/VIEWS:  XR CHEST PORTABLE      FINDINGS:    There is a suboptimal inspiration   This may partly explain the prominent appearance of the heart   Cardiomegaly not excluded  There is an ill-defined right lung infiltrate in the infrahilar region and there appear to be trace pleural effusions   Left lung clear   No pneumothorax identified  Osseous structures appear within normal limits for patient age  Impression:       Right-sided infiltrates suspicious for developing pneumonia as well as trace pleural effusions  Questionable cardiomegaly       Limited inspiration may exaggerate some of the findings   Suggest continued follow-up preferably with deeper inspiratory effort with PA and lateral views  Portions of the record may have been created with voice recognition software  Occasional wrong word or "sound a like" substitutions may have occurred due to the inherent limitations of voice recognition software  Read the chart carefully and recognize, using context, where substitutions have occurred

## 2019-01-09 NOTE — ASSESSMENT & PLAN NOTE
Lab Results   Component Value Date    HGBA1C 8 2 (H) 01/02/2019       Recent Labs      01/08/19   1440  01/08/19   1540   POCGLU  449*  469*       Blood Sugar Average: Last 72 hrs:     Blood glucose significantly elevated in 400s  Will check stat Accucheck  Patient currently on sliding scale only  Will start the patient on basal bolus regimen  Lantus 15 units with Humalog 3 units t i d  Plus sliding scale

## 2019-01-09 NOTE — ASSESSMENT & PLAN NOTE
Baseline creatinine unknown  Patient had significantly elevated creatinine on admission at [facility]  Was evaluated by Nephrology  Because of worsening renal function had to be started on dialysis at [facility] on 1/4/2019  Will consult Nephrology for dialysis

## 2019-01-09 NOTE — ASSESSMENT & PLAN NOTE
Coronary artery disease noted on cardiac catheterization  Awaiting CT surgery evaluation  Cardiology input noted

## 2019-01-09 NOTE — MEDICAL STUDENT
MEDICAL STUDENT  Inpatient H&P for TRAINING ONLY   Not Part of Legal Medical Record       H&P Exam - Nicolas Bundy 72 y o  male MRN: [id number]    Unit/Bed#: [facility] 403-01 Encounter: [id number]    Assessment:  Mr Gage Villalta is a 71 yo male with a long-standing history of insulin-dependent diabetes mellitus type II with long-term complications that is currently poorly controlled  Plan:  1  Diabetes mellitus type II, poorly controlled  - Most recent A1c 8 2% (1/2/19)  - Blood sugars ranging 246-397  - Patient currently receivng lantus 20 units, humalog 8 units TID with meals + sliding scale  - plan to see how patient responds to 20 units lantus, if morning BG still high will increase     2  Diabetic nephropathy  - microalbuminuria of 1490 on 1/1/19, microalbumin/creat ratio of 6234  - consider starting ace-inhibitor      CC: Consultation for diabetes mellitus type II with long-term complications     History of Present Illness   Mr Gage Villalta is a 71 yo male with who was admitted to [facility] on 12/31/2018 for NSTEMI with multiple vessel disease  He was found to have NATALIIA for which he has started receiving hemodialysis  He was transferred to to [facility] on 1/09/19 for possible CABG  Endocrinology was consulted for Mr Garrido's insulin-dependent Diabetes Mellitus Type II with diabetic nephropathy  Mr Gage Villalta was origianally diagnosed with DM 40 years ago and is on insulin at home and has been on insulin for "many years " He reports taking 30 units of insulin in the morning and 20 units in the afternoon  He takes his blood glucose 1x a day and says that it ranges from <200 to 400-500  He reports that it reaches the 400s "quite often " He says that a doctor in [address] [place] [address] prescribes him his insulin  He says that 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 and that he experiences some minor numbness in his fingers and toes, with the worst numbness being in his toes   He does not follow with an opthalmologist  He says he tries to avoid sugar, but does not follow a diet  He is currently receiving a basal bolus regimen with lantus 20  Units, humalog 8 units TID with meals  His blood sugars in the hospital have ranged have slowly decreased from the mid 400s to the mid 200s  His most recent HbA1c was 8 2% on 1/2/2019  He was also found to have a very elevated microalbuminuria of 1490      ROS: Negative except decreased appetite  Historical Information   Past Medical History:   Diagnosis Date    [facility] [address] [facility]     Diabetes mellitus [facility] [address] [facility]     Dysphagia     Hyperlipidemia     Hypertension     Myocardial infarction [facility])      Past Surgical History:   Procedure Laterality Date    CATARACT EXTRACTION      IR SOSA JAIME HSPTL PLACEMENT  1/4/2019     Social History   History   Alcohol Use No     History   Drug Use No     History   Smoking Status    Former Smoker   Smokeless Tobacco    Never Used       Meds/Allergies   PTA meds:   Prior to Admission Medications   Prescriptions Last Dose Informant Patient Reported? Taking?    METOPROLOL SUCCINATE PO   Yes No   Sig: Take 500 mg by mouth   aspirin 81 mg chewable tablet   Yes No   Sig: Chew 81 mg daily   atorvastatin (LIPITOR) 10 mg tablet   Yes No   Sig: Take 10 mg by mouth   benzonatate (TESSALON PERLES) 100 mg capsule   Yes No   Sig: Take 100 mg by mouth   pantoprazole (PROTONIX) 40 mg tablet   Yes No   Sig: Take 40 mg by mouth   sodium bicarbonate 325 MG tablet   Yes No   Sig: Take 325 mg by mouth   torsemide (DEMADEX) 20 mg tablet   Yes No   Sig: Take 20 mg by mouth      Facility-Administered Medications: None     No Known Allergies    Objective   First Vitals:   Blood Pressure: 157/79 (01/08/19 1904)  Pulse: 85 (01/08/19 1904)  Temperature: 97 6 °F (36 4 °C) (01/08/19 1904)  Temp Source: Oral (01/08/19 1904)  Respirations: 18 (01/08/19 1904)  Weight - Scale: 62 8 kg (138 lb 8 oz) (01/09/19 0500)  SpO2: 100 % (01/08/19 1904)    Current Vitals:   Blood Pressure: 134/76 (Map 99) (01/09/19 1131)  Pulse: 72 (01/09/19 1131)  Temperature: 97 9 °F (36 6 °C) (01/09/19 1131)  Temp Source: Oral (01/09/19 1131)  Respirations: 18 (01/09/19 1131)  Weight - Scale: 62 8 kg (138 lb 8 oz) (01/09/19 0500)  SpO2: 100 % (01/09/19 1131)      Intake/Output Summary (Last 24 hours) at 01/09/19 1330  Last data filed at 01/09/19 1229   Gross per 24 hour   Intake           750 25 ml   Output              550 ml   Net           200 25 ml       Invasive Devices     Peripheral Intravenous Line            Peripheral IV 01/05/19 Left Antecubital 3 days          Hemodialysis Catheter            Permanent HD Catheter  5 days                Lab Results:   Lab Results   Component Value Date    WBC 9 93 01/10/2019    HGB 9 7 (L) 01/10/2019    HCT 31 5 (L) 01/10/2019    MCV 84 01/10/2019     (L) 01/10/2019     Lab Results   Component Value Date    CALCIUM 8 8 01/10/2019    K 4 8 01/10/2019    CO2 30 01/10/2019    CL 94 (L) 01/10/2019    BUN 27 (H) 01/10/2019    CREATININE 3 51 (H) 01/10/2019       Code Status: Level 1 - Full Code    Tatiana Velez, MS4  This note is for training purposes only and not part of the legal medical record

## 2019-01-09 NOTE — ASSESSMENT & PLAN NOTE
Patient reported that he has been having only liquid since last 6 months  Part of it is because he does not have any taste and also admitted to having trouble swallowing  He said he has lost approximately 30 lb in last 6 months  ? Due to CHF treatment  Continue to monitor dietary intake  If continues to be low consider GI evaluation

## 2019-01-09 NOTE — SPEECH THERAPY NOTE
Speech Language/Pathology  Order received, chart reviewed  Pt off floor at   Nsg reports that the pt has not had difficulty swallowing for, pills  Per notes the pt is unable to tolerate any but liquids & has had a significant wt loss    SLP will return tomorrow for full eval

## 2019-01-09 NOTE — PROGRESS NOTES
Progress Note Imelda Wilkins 1945, 72 y o  male MRN: [id number]    Unit/Bed#: [facility] 403-01 Encounter: [id number]    Primary Care Provider: No primary care provider on file  Date and time admitted to hospital: 1/8/2019  6:05 PM        * MI, acute, non ST segment elevation (HCC)   Assessment & Plan    Coronary artery disease noted on cardiac catheterization  Awaiting CT surgery evaluation  Cardiology input noted     Dysphagia   Assessment & Plan    Poor appetite dysphagia weight loss  Will request GI inputs     Type 2 diabetes mellitus with kidney complication, with long-term current use of insulin [facility])   Assessment & Plan    Lab Results   Component Value Date    HGBA1C 8 2 (H) 01/02/2019       Recent Labs      01/08/19   1950  01/08/19   2110  01/09/19   0615  01/09/19   1053   POCGLU  381*  397*  246*  273*       Blood Sugar Average: Last 72 hrs:  (P) 324 25     Will have him on Lantus 20 units, Humalog 8 units t i d  With meals  Monitor Accu-Cheks closely  Avoid hypoglycemia  Hypoglycemia protocol in place     Acute on chronic congestive heart failure (HCC)   Assessment & Plan    Volume managed on dialysis  2D echo reveals EF 30%, diffuse hypokinesis  Cardiology following     NATALIIA (acute kidney injury) [facility])   Assessment & Plan    Presently on hemodialysis  Nephrology following       VTE Pharmacologic Prophylaxis:   Pharmacologic: Heparin Drip  Mechanical VTE Prophylaxis in Place: Yes    Patient Centered Rounds: I have performed bedside rounds with nursing staff today  Discussions with Specialists or Other Care Team Provider:      Education and Discussions with Family / Patient: pt    Time Spent for Care: 30 minutes  More than 50% of total time spent on counseling and coordination of care as described above      Current Length of Stay: 1 day(s)    Current Patient Status: Inpatient   Certification Statement: The patient will continue to require additional inpatient hospital stay due to As mentioned    Discharge Plan:  Awaiting CT surgery inputs    Code Status: Level 1 - Full Code      Subjective:     Reports feeling tired and fatigued  History chart labs medications reviewed    Objective:     Vitals:   Temp (24hrs), Av 8 °F (36 6 °C), Min:97 6 °F (36 4 °C), Max:98 1 °F (36 7 °C)    Temp:  [97 6 °F (36 4 °C)-98 1 °F (36 7 °C)] 97 9 °F (36 6 °C)  HR:  [77-87] 87  Resp:  [18-20] 20  BP: (129-170)/(62-84) 158/81  SpO2:  [92 %-100 %] 100 %  Body mass index is 23 05 kg/m²  Input and Output Summary (last 24 hours): Intake/Output Summary (Last 24 hours) at 19 1129  Last data filed at 19 0738   Gross per 24 hour   Intake           570 25 ml   Output              550 ml   Net            20 25 ml       Physical Exam:     Physical Exam    Comfortably in bed  Lethargic  Neck supple  Lungs diminished breath sounds bilaterally  Heart sounds S1 and S2 noted  Abdomen soft  Awake obeys simple commands  No rash  Pulses noted    Additional Data:     Labs:      Results from last 7 days  Lab Units 19  0356   WBC Thousand/uL 9 61   HEMOGLOBIN g/dL 9 6*   HEMATOCRIT % 30 7*   PLATELETS Thousands/uL 125*   NEUTROS PCT % 74   LYMPHS PCT % 17   MONOS PCT % 8   EOS PCT % 1       Results from last 7 days  Lab Units 19  0355  19  0510   SODIUM mmol/L 127*  < > 132*   POTASSIUM mmol/L 4 5  < > 4 3   CHLORIDE mmol/L 88*  < > 95*   CO2 mmol/L 33*  < > 32   BUN mg/dL 37*  < > 25   CREATININE mg/dL 4 19*  < > 3 25*   ANION GAP mmol/L 6  < > 5   CALCIUM mg/dL 9 0  < > 8 1*   ALBUMIN g/dL  --   --  2 4*   TOTAL BILIRUBIN mg/dL  --   --  0 40   ALK PHOS U/L  --   --  61   ALT U/L  --   --  20   AST U/L  --   --  14   GLUCOSE RANDOM mg/dL 212*  < > 385*   < > = values in this interval not displayed      Results from last 7 days  Lab Units 01/06/19  1502   INR  1 09       Results from last 7 days  Lab Units 19  1053 19  0615 19  2110 19  1950 19  1540 19  1440 POC GLUCOSE mg/dl 273* 246* 397* 381* 469* 449*                   * I Have Reviewed All Lab Data Listed Above  * Additional Pertinent Lab Tests Reviewed:  All Labs Within Last 24 Hours Reviewed    Imaging:    Imaging Reports Reviewed Today Include:  Imaging studies reviewed  Imaging Personally Reviewed by Myself Includes:      Recent Cultures (last 7 days):           Last 24 Hours Medication List:     Current Facility-Administered Medications:  acetaminophen 650 mg Oral Q6H PRN Teresa Montes De Oca MD    aspirin 81 mg Oral Daily Teresa Montes De Oca MD    atorvastatin 40 mg Oral Daily With Shayla Patel MD    b complex-vitamin C-folic acid 1 capsule Oral Daily With Shayla Patel MD    benzonatate 100 mg Oral TID Teresa Montes De Oca MD    calcium acetate 667 mg Oral TID With Karl Grayson MD    epoetin deepa 2,000 Units Intravenous After Dialysis Teresa Montes De Oca MD    And        epoetin deepa 3,000 Units Intravenous After Dialysis Teresa Montes De Oca MD    furosemide 40 mg Oral BID (diuretic) Teresa Montes De Oca MD    heparin (porcine) 3-20 Units/kg/hr (Order-Specific) Intravenous Titrated Teresa Montes De Oca MD Last Rate: 13 Units/kg/hr (01/09/19 0720)   heparin (porcine) 2,000 Units Intravenous PRN Teresa Montes De Oca MD    heparin (porcine) 4,000 Units Intravenous PRN Teresa Montes De Oca MD    insulin glargine 20 Units Subcutaneous HS Ronnie Aguayo MD    insulin lispro 1-5 Units Subcutaneous HS Teresa Montes De Oca MD    insulin lispro 1-6 Units Subcutaneous TID AC Teresa Montes De Oca MD    insulin lispro 8 Units Subcutaneous TID With Meals Ronnie Aguayo MD    melatonin 3 mg Oral HS Teresa Montes De Oca MD    metoprolol tartrate 25 mg Oral Q12H [address] Teresa Montes De Oca MD    ondansetron 4 mg Intravenous Q6H PRN Teresa Montes De Oca MD    pantoprazole 40 mg Oral Early Morning Teresa Montes De Oca MD         Today, Patient Was Seen By: Ronnie Aguayo MD    ** Please Note: Dictation voice to text software may have been used in the creation of this document   **

## 2019-01-09 NOTE — ASSESSMENT & PLAN NOTE
Lab Results   Component Value Date    HGBA1C 8 2 (H) 01/02/2019       Recent Labs      01/08/19   1950  01/08/19   2110  01/09/19   0615  01/09/19   1053   POCGLU  381*  397*  246*  273*       Blood Sugar Average: Last 72 hrs:  (P) 324 25     Will have him on Lantus 20 units, Humalog 8 units t i d   With meals  Monitor Accu-Cheks closely  Avoid hypoglycemia  Hypoglycemia protocol in place

## 2019-01-09 NOTE — CONSULTS
Consultation -  [facility]  Aspen Harris 72 y o  male MRN: [id number]  Unit/Bed#: [facility] 403-01 Encounter: [id number]        ASSESSMENT/PLAN:   56-year-old male past medical history of CKD stage 4 now on hemodialysis, hypertension, CHF, type 2 diabetes, chronic anemia admitted with MI status post cardiac catheterization is being evaluated by GI for dysphagia in the setting of need for MARQUISE  1  Dysphagia  2  Loss of appetite  3  Weight loss   4  Chronic anemia  5  Screening for colon cancer   -he reports that over the past 6 months, he has had loss of his appetite secondary to loss of taste  He had initially describes a 20 lb weight loss over the past 6 months but then states it was 30 lb      -He states he has difficulty swallowing, although he describes this as chewing up the food and spitting out as it has no taste    -he does relate history of possible esophageal stricture, no recent endoscopy reports were found and he does not recall who performed the procedure  -CT chest abdomen pelvis without contrast did not reveal a cause for his symptoms    -given that he needs to have a MARQUISE done tomorrow and this is a blind study, would recommend ruling out esophageal stricture or obstructing mass prior to this  Would also recommend evaluation for evidence of reflux as this can affect sense of taste  - We discussed the risks and benefits with him and he is agreeable to proceeding with the upper endoscopy tomorrow    -he has chronic anemia that is likely related to his kidney disease, his hemoglobin has been stable in the 9-10 range  He denies any overt GI bleeding however occult bleeding cannot be ruled out  This will be further evaluated with the upper endoscopy, would recommend colonoscopy as well as an outpatient  He has never had a colonoscopy so would recommend this from a screening standpoint as well as for evaluation of his weight loss, loss of appetite and anemia     -we will coordinate with the cardiology team regarding the procedures tomorrow   -NPO after midnight    Inpatient consult to gastroenterology  Consult performed by: Fabby Shin  Consult ordered by: Rosa Setting    Inpatient consult to gastroenterology  Consult performed by: Fabby Shin  Consult ordered by: Sandip Grayson          Reason for Consult / Principal Problem: MI, acute, non ST segment elevation ([address] )    HPI: Jennifer Garcia is a 72y o  year old male with a PMH of CKD stage IV now on hemodialysis, hypertension, CHF, type 2 diabetes, chronic anemia admitted with MI status post cardiac catheterization on 1/7/19 is being evaluated by GI for dysphagia, decreased appetite and weight loss  He reports that for the past 6 months he has lost his sense of taste and also he has lost his appetite  He states that he chews of food and simply spits it out  He denies food getting stuck but later states that he has trouble swallowing and states that he has a history of esophageal stricture  Initially he reported a 20 lb weight loss in the past 6 months and then reported a 30 lb weight loss  He admits to intermittent nausea and vomiting  He denies any abdominal pain, reflux, change in bowel habits, hematochezia, melena, fevers, chills or jaundice  He believes he has had an upper endoscopy in the past, review of his chart did not reveal any recent procedures through [facility] or chart review  He has never had a colonoscopy  Review of Systems: as per HPI  Review of Systems   Constitutional: Positive for appetite change, fatigue and unexpected weight change  Negative for activity change, chills and fever  HENT: Positive for trouble swallowing  Negative for mouth sores and sore throat  Respiratory: Negative for shortness of breath  Cardiovascular: Negative for chest pain     Gastrointestinal: Negative for abdominal distention, abdominal pain, blood in stool, constipation, diarrhea, nausea and vomiting  Skin: Negative for color change, pallor, rash and wound  Neurological: Negative for tremors and syncope  All other systems reviewed and are negative  Historical Information   Past Medical History:   Diagnosis Date    [facility]congest[facility] failure[facility] [address] [facility]     [facility]     Dysphagia     Hyperlipidemia     Hypertension     Myocardial infarction [facility])      Past Surgical History:   Procedure Laterality Date    CATARACT EXTRACTION      IR [address] PL[address]  1/4/2019     Social History   History   Alcohol Use No     History   Drug Use No     History   Smoking Status    Former Smoker   Smokeless Tobacco    Never Used     No family history on file      Meds/Allergies     Prescriptions Prior to Admission   Medication    aspirin 81 mg chewable tablet    atorvastatin (LIPITOR) 10 mg tablet    benzonatate (TESSALON PERLES) 100 mg capsule    METOPROLOL SUCCINATE PO    pantoprazole (PROTONIX) 40 mg tablet    sodium bicarbonate 325 MG tablet    torsemide (DEMADEX) 20 mg tablet     Current Facility-Administered Medications   Medication Dose Route Frequency    acetaminophen (TYLENOL) tablet 650 mg  650 mg Oral Q6H PRN    aspirin chewable tablet 81 mg  81 mg Oral Daily    atorvastatin (LIPITOR) tablet 40 mg  40 mg Oral Daily With Dinner    b complex-vitamin C-folic acid (NEPHROCAPS) capsule 1 capsule  1 capsule Oral Daily With Dinner    benzonatate (TESSALON PERLES) capsule 100 mg  100 mg Oral TID    calcium acetate (PHOSLO) capsule 667 mg  667 mg Oral TID With Meals    chlorhexidine (PERIDEX) 0 12 % oral rinse 15 mL  15 mL Swish & Spit Q12H [facility] & long-term    epoetin deepa (EPOGEN,PROCRIT) injection 2,000 Units  2,000 Units Intravenous After Dialysis    And    epoetin deepa (EPOGEN,PROCRIT) injection 3,000 Units  3,000 Units Intravenous After Dialysis    furosemide (LASIX) tablet 40 mg  40 mg Oral BID (diuretic)    heparin (porcine) 25,000 units in 250 mL infusion (premix)  3-20 Units/kg/hr (Order-Specific) Intravenous Titrated    heparin (porcine) injection 2,000 Units  2,000 Units Intravenous PRN    heparin (porcine) injection 4,000 Units  4,000 Units Intravenous PRN    insulin glargine (LANTUS) subcutaneous injection 20 Units 0 2 mL  20 Units Subcutaneous HS    insulin lispro (HumaLOG) 100 units/mL subcutaneous injection 1-5 Units  1-5 Units Subcutaneous HS    insulin lispro (HumaLOG) 100 units/mL subcutaneous injection 1-6 Units  1-6 Units Subcutaneous TID AC    insulin lispro (HumaLOG) 100 units/mL subcutaneous injection 8 Units  8 Units Subcutaneous TID With Meals    melatonin tablet 3 mg  3 mg Oral HS    metoprolol tartrate (LOPRESSOR) tablet 25 mg  25 mg Oral Q12H JUAN C    mupirocin (BACTROBAN) 2 % nasal ointment 1 application  1 application Nasal F39V [facility]    ondansetron (ZOFRAN) injection 4 mg  4 mg Intravenous Q6H PRN    pantoprazole (PROTONIX) EC tablet 40 mg  40 mg Oral Early Morning       No Known Allergies    Objective     Blood pressure 114/63, pulse 71, temperature (!) 96 7 °F (35 9 °C), temperature source Tympanic, resp  rate 22, weight 62 8 kg (138 lb 8 oz), SpO2 100 %  Intake/Output Summary (Last 24 hours) at 01/09/19 1624  Last data filed at 01/09/19 1355   Gross per 24 hour   Intake           1011 5 ml   Output              550 ml   Net            461 5 ml       PHYSICAL EXAM     Physical Exam   Constitutional: He is oriented to person, place, and time  No distress  Currently undergoing dialysis   HENT:   Head: Normocephalic and atraumatic  Eyes: Right eye exhibits no discharge  Left eye exhibits no discharge  No scleral icterus  Neck: Neck supple  No tracheal deviation present  Cardiovascular: Normal rate, regular rhythm, normal heart sounds and intact distal pulses  Exam reveals no gallop and no friction rub  No murmur heard  Pulmonary/Chest: Effort normal and breath sounds normal  No respiratory distress  He has no wheezes  He has no rales  He exhibits no tenderness  Abdominal: Soft  Bowel sounds are normal  He exhibits no distension and no mass  There is no tenderness  There is no rebound and no guarding  Neurological: He is alert and oriented to person, place, and time  Skin: Skin is warm and dry  Psychiatric: He has a normal mood and affect  Lab Results:   CBC: Lab Results   Component Value Date    WBC 10 13 01/09/2019    HGB 9 2 (L) 01/09/2019    HCT 29 1 (L) 01/09/2019    MCV 82 01/09/2019     (L) 01/09/2019    MCH 26 1 (L) 01/09/2019    MCHC 31 6 01/09/2019    RDW 16 5 (H) 01/09/2019    MPV 12 2 01/09/2019    NRBC 0 01/09/2019   ,   CMP: Lab Results   Component Value Date    K 4 4 01/09/2019    CL 87 (L) 01/09/2019    CO2 33 (H) 01/09/2019    BUN 40 (H) 01/09/2019    CREATININE 4 48 (H) 01/09/2019    CALCIUM 9 1 01/09/2019    EGFR 12 01/09/2019   Imaging Studies: I have personally reviewed pertinent reports  CT CHEST, ABDOMEN AND PELVIS WITHOUT IV CONTRAST     INDICATION:   Chest pain and renal failure      COMPARISON:  None      TECHNIQUE: CT examination of the chest, abdomen and pelvis was performed without intravenous contrast   Axial, sagittal, and coronal 2D reformatted images were created from the source data and submitted for interpretation       Radiation dose length product (DLP) for this visit:  615 mGy-cm   This examination, like all CT scans performed in the [facility], was performed utilizing techniques to minimize radiation dose exposure, including the use of iterative   reconstruction and automated exposure control       Enteric contrast was not administered in accordance with physician request       FINDINGS:     CHEST     LUNGS:  There is bibasilar consolidation, likely compressive atelectasis    There is a 2 mm right upper lobe pulmonary nodule on series 3, image 52      PLEURA:  There are moderate bilateral pleural effusions      HEART/GREAT VESSELS:  There is mild cardiomegaly      MEDIASTINUM AND VIKKI:  Unremarkable      CHEST WALL AND LOWER NECK:   Unremarkable      ABDOMEN     LIVER/BILIARY TREE:  Unremarkable      GALLBLADDER:  There are gallstone(s) within the gallbladder, without pericholecystic inflammatory changes      SPLEEN:  Unremarkable      PANCREAS:  Unremarkable      ADRENAL GLANDS:  Unremarkable      KIDNEYS/URETERS:  Unremarkable  No hydronephrosis      STOMACH AND BOWEL:  Unremarkable      APPENDIX:  No findings to suggest appendicitis      ABDOMINOPELVIC CAVITY:  No ascites or free intraperitoneal air  No lymphadenopathy      VESSELS:  Unremarkable for patient's age      PELVIS     REPRODUCTIVE ORGANS:  Unremarkable for patient's age      URINARY BLADDER:  Unremarkable      ABDOMINAL WALL/INGUINAL REGIONS:  Unremarkable      OSSEOUS STRUCTURES:  No acute fracture or destructive osseous lesion      IMPRESSION:     1   Cardiomegaly with moderate bilateral pleural effusions and overlying compressive atelectasis  Clinical correlation for congestive heart failure recommended      2   2 mm right upper lobe nodule  Based on current Fleischner Society 2017 Guidelines on incidental pulmonary nodule, no routine follow-up is needed if the patient is considered low risk for lung cancer  If the patient is considered high risk for lung   cancer, 12 month follow-up non-contrast chest CT is recommended      3  No acute abnormality in the abdomen or pelvis      4  Cholelithiasis        Patient was seen and examined by Dr Stephen Montaño  All rosario medical decisions were made by Dr Stephen Montaño  Thank you for allowing us to participate in the care of this present patient  We will follow-up with you closely

## 2019-01-09 NOTE — CONSULTS
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 for cardiac surgical evaluation  Prior to this admission, he was recently admitted to [facility] with CHF and was discharged on torsemide  The patient had been told in the past he needed hemodialysis, but he has refused in the past   He recently had a permacath placed and underwent his first HD treatment on 1/4/19  The patient reports a 30lb weight loss over the past 6 months  He states he has a decreased appetite and has difficulty swallowing solids, therefore, he only drinks liquids  He reports he is inactive and sedentary  He only lays/sits and watches TV all day  The patient has lived between [address] over the past several years and his medical care has been intermittent  His last documented visit in [facility] was September 2016, before he permanently left to reside in [place]  He quit smoking in 2010, having smoked 1/2 PPD  He denies alcohol use or family cardiac history       Past Medical History:  Past Medical History:   Diagnosis Date    [facility]est[facility] [address] [facility]     Diabetes mellitus [facility] [address] [facility]     Dysphagia     Hyperlipidemia     Hypertension     Myocardial infarction [facility])          Past Surgical History:   Past Surgical History:   Procedure Laterality Date    CATARACT EXTRACTION      IR SOSA JAIME HSPTL PLACEMENT  1/4/2019         Family History:  No family history on file  Social History:  History   Alcohol Use No     History   Drug Use No     History   Smoking Status    Former Smoker   Smokeless Tobacco    Never Used     Marital Status: /Civil Union      Home Medications:   Prior to Admission medications    Medication Sig Start Date End Date Taking?  Authorizing Provider   aspirin 81 mg chewable tablet Chew 81 mg daily    Historical Provider, MD   atorvastatin (LIPITOR) 10 mg tablet Take 10 mg by mouth    Historical Provider, MD   benzonatate (TESSALON PERLES) 100 mg capsule Take 100 mg by mouth    Historical Provider, MD   METOPROLOL SUCCINATE PO Take 500 mg by mouth    Historical Provider, MD   pantoprazole (PROTONIX) 40 mg tablet Take 40 mg by mouth    Historical Provider, MD   sodium bicarbonate 325 MG tablet Take 325 mg by mouth    Historical Provider, MD   torsemide (DEMADEX) 20 mg tablet Take 20 mg by mouth    Historical Provider, MD       Inpatient Medications:  Scheduled Meds:   Current Facility-Administered Medications:  acetaminophen 650 mg Oral Q6H PRN Elvia Camacho MD    aspirin 81 mg Oral Daily Elvia Camacho MD    atorvastatin 40 mg Oral Daily With Mani Herbert MD    b complex-vitamin C-folic acid 1 capsule Oral Daily With Mani Herbert MD    benzonatate 100 mg Oral TID Elvia Camacho MD    calcium acetate 667 mg Oral TID With Meals Elvia Camacho MD    epoetin deepa 2,000 Units Intravenous After Dialysis Elvia Camacho MD    And        epoetin deepa 3,000 Units Intravenous After Dialysis Elvia Camacho MD    furosemide 40 mg Oral BID (diuretic) Elvia Camacho MD    heparin (porcine) 3-20 Units/kg/hr (Order-Specific) Intravenous Titrated [facility] Steve Yost MD Last Rate: 13 Units/kg/hr (01/09/19 0720)   heparin (porcine) 2,000 Units Intravenous PRN Trevon Teresa MD    heparin (porcine) 4,000 Units Intravenous PRN Trevon Teresa MD    insulin glargine 15 Units Subcutaneous HS Trevon Teresa MD    insulin lispro 1-5 Units Subcutaneous HS Trevon Teresa MD    insulin lispro 1-6 Units Subcutaneous TID AC Trevon Teresa MD    insulin lispro 3 Units Subcutaneous TID With Meals Trevon Teresa MD    melatonin 3 mg Oral HS Trevon Teresa MD    metoprolol tartrate 25 mg Oral Q12H [address] Trevon Teresa MD    ondansetron 4 mg Intravenous Q6H PRN Trevon Teresa MD    pantoprazole 40 mg Oral Early Morning Trevon Teresa MD      Continuous Infusions:   heparin (porcine) 3-20 Units/kg/hr (Order-Specific) Last Rate: 13 Units/kg/hr (01/09/19 0720)     PRN Meds:    acetaminophen 650 mg Q6H PRN   epoetin deepa 2,000 Units After Dialysis   And     epoetin deepa 3,000 Units After Dialysis   heparin (porcine) 2,000 Units PRN   heparin (porcine) 4,000 Units PRN   ondansetron 4 mg Q6H PRN       Allergies:  No Known Allergies    Review of Systems:  Review of Systems   Constitutional: Positive for activity change, appetite change, fatigue and unexpected weight change  Negative for chills, diaphoresis and fever  HENT: Positive for trouble swallowing  Negative for dental problem, mouth sores, nosebleeds, postnasal drip and sinus pain  Eyes: Negative  Respiratory: Positive for shortness of breath  Negative for cough, choking and wheezing  Cardiovascular: Positive for chest pain and leg swelling  Negative for palpitations  Gastrointestinal: Negative for abdominal pain, constipation, diarrhea, nausea and vomiting  Endocrine: Negative  Genitourinary: Negative  Musculoskeletal: Negative  Skin: Negative  Allergic/Immunologic: Negative  Neurological: Positive for weakness   Negative for dizziness, seizures, syncope, light-headedness and numbness  Hematological: Negative  Psychiatric/Behavioral: Negative  All other systems reviewed and are negative  Vital Signs:     Vitals:    01/08/19 2342 01/09/19 0322 01/09/19 0500 01/09/19 0703   BP: 147/84 145/71  158/81   BP Location: Right arm Right arm  Right arm   Pulse: 77 82  87   Resp: 18 18  20   Temp: 98 1 °F (36 7 °C) 97 8 °F (36 6 °C)  97 9 °F (36 6 °C)   TempSrc: Oral Oral  Oral   SpO2: 100% 100%  100%   Weight:   62 8 kg (138 lb 8 oz)      Invasive Devices     Peripheral Intravenous Line            Peripheral IV 01/05/19 Left Antecubital 3 days          Hemodialysis Catheter            Permanent HD Catheter  5 days                Physical Exam:  Physical Exam   Constitutional: He is oriented to person, place, and time  He appears well-developed and well-nourished  He appears lethargic  He is sleeping  No distress  HENT:   Head: Normocephalic and atraumatic  Right Ear: External ear normal    Left Ear: External ear normal    Nose: Nose normal    Mouth/Throat: Oropharynx is clear and moist  No oropharyngeal exudate  Eyes: Pupils are equal, round, and reactive to light  Conjunctivae and EOM are normal  Right eye exhibits no discharge  No scleral icterus  Neck: Normal range of motion  Neck supple  No JVD present  No tracheal deviation present  Cardiovascular: Normal rate, regular rhythm, normal heart sounds and intact distal pulses  Exam reveals no gallop and no friction rub  No murmur heard  Pulmonary/Chest: Effort normal and breath sounds normal  No respiratory distress  He has no wheezes  He has no rales  Abdominal: Soft  Bowel sounds are normal  He exhibits no distension  There is no tenderness  There is no rebound and no guarding  Musculoskeletal: Normal range of motion  He exhibits no edema, tenderness or deformity  Neurological: He is oriented to person, place, and time  He has normal reflexes  He appears lethargic   He displays normal reflexes  No cranial nerve deficit  He exhibits normal muscle tone  Coordination normal    Skin: Skin is warm and dry  No rash noted  He is not diaphoretic  No erythema  No pallor  Lab Results:       Results from last 7 days  Lab Units 01/09/19 0356 01/08/19 1954 01/08/19  0449   WBC Thousand/uL 9 61 9 64 8 74   HEMOGLOBIN g/dL 9 6* 10 0* 10 0*   HEMATOCRIT % 30 7* 32 6* 32 0*   PLATELETS Thousands/uL 125* 132* 104*       Results from last 7 days  Lab Units 01/09/19 0355 01/08/19 1954 01/08/19  0449   POTASSIUM mmol/L 4 5 4 8 5 1   CHLORIDE mmol/L 88* 85* 92*   CO2 mmol/L 33* 36* 35*   BUN mg/dL 37* 36* 28*   CREATININE mg/dL 4 19* 4 03* 3 48*   CALCIUM mg/dL 9 0 9 0 8 7       Results from last 7 days  Lab Units 01/09/19  0355 01/08/19 1953 01/08/19 1250 01/06/19  1502   INR   --   --   --   --  1 09   PTT seconds 44* 59* 55*  < > 41*   < > = values in this interval not displayed  Lab Results   Component Value Date    HGBA1C 8 2 (H) 01/02/2019     Lab Results   Component Value Date    CKTOTAL 85 01/02/2019    TROPONINI 0 28 (H) 01/06/2019   Peak troponin 0 39  HIV, Hepatitis panel negative  BNP 92315  Urine micro + RBC, WBC    Imaging Studies:     Cardiac Catheterization: CORONARY CIRCULATION:  Distal left main: There was a tubular 50 % stenosis  Proximal LAD: There was a tubular 90 % stenosis  2nd diagonal: The vessel was small sized  Angiography showed minor luminal irregularities  Ostial circumflex: The vessel was large sized (dominant)  2nd obtuse marginal: The vessel was small sized  Angiography showed mild atherosclerosis  3rd obtuse marginal: The vessel was small sized  Angiography showed mild atherosclerosis  Left posterior descending artery: There was a 90 % stenosis      HEMODYNAMICS:  Hemodynamic assessment demonstrated mildly to moderately elevated LVEDP  Echocardiogram: LEFT VENTRICLE:  Systolic function was normal  Ejection fraction was estimated to be 35 %    There was severe diffuse hypokinesis      RIGHT VENTRICLE:  The ventricle was mildly dilated      MITRAL VALVE:  There was moderate regurgitation      AORTIC VALVE:  The valve was trileaflet  Leaflets exhibited mild calcification and normal cuspal separation      TRICUSPID VALVE:  There was mild regurgitation  Estimated peak PA pressure was 60 mmHg  CT C/A/P 12/31/18: 1  Cardiomegaly with moderate bilateral pleural effusions and overlying compressive atelectasis  Clinical correlation for congestive heart failure recommended    2   2 mm right upper lobe nodule  Based on current Fleischner Society 2017 Guidelines on incidental pulmonary nodule, no routine follow-up is needed if the patient is considered low risk for lung cancer  If the patient is considered high risk for lung   cancer, 12 month follow-up non-contrast chest CT is recommended  3   No acute abnormality in the abdomen or pelvis  4   Cholelithiasis  PCXR 12/31/18: Right-sided infiltrates suspicious for developing pneumonia as well as trace pleural effusions      Questionable cardiomegaly        Limited inspiration may exaggerate some of the findings  Suggest continued follow-up preferably with deeper inspiratory effort with PA and lateral views    I have personally reviewed pertinent films in PACS    Assessment:  Patient Active Problem List    Diagnosis Date Noted    Anemia due to chronic kidney disease, on chronic dialysis ([facility] [address] ) 01/08/2019    Type 2 diabetes mellitus with kidney complication, with long-term current use of insulin ([facility] [address] ) 01/08/2019    Dysphagia 01/08/2019    MI, acute, non ST segment elevation ([facility] [address] ) 01/03/2019    Acute on chronic congestive heart failure ([facility] [address] ) 01/03/2019    Hyponatremia 01/01/2019    Shortness of breath 12/31/2018    Chest pain 12/31/2018    NATALIIA (acute kidney injury) ([facility] [address] ) 12/31/2018    CKD (chronic kidney disease) 12/31/2018    Cardiomyopathy ([facility] [address] ) 12/31/2018     Severe coronary artery disease;  Ongoing CABG workup    Plan:  Risks and benefits of coronary artery bypass grafting were discussed in detail today with the patient  They understand and wish to proceed with further workup and ultimately surgical intervention  We have ordered routine preoperative laboratory and vascular studies  Pending the results of these tests, they will be scheduled for surgery with NELSY Carlos was comfortable with our recommendations, and their questions were answered to their satisfaction  We will continue to evaluate the patient daily with further recommendations as work up is completed  Thank you for allowing us to participate in the care of this patient       SIGNATURE: Rocky Mckeon PA-C  DATE: January 9, 2019  TIME: 10:50 AM

## 2019-01-10 ENCOUNTER — ANESTHESIA (INPATIENT)
Dept: GASTROENTEROLOGY | Facility: HOSPITAL | Age: 74
DRG: 280 | End: 2019-01-10
Payer: MEDICARE

## 2019-01-10 ENCOUNTER — APPOINTMENT (INPATIENT)
Dept: NON INVASIVE DIAGNOSTICS | Facility: HOSPITAL | Age: 74
DRG: 280 | End: 2019-01-10
Payer: MEDICARE

## 2019-01-10 PROBLEM — I25.10 CORONARY ARTERY DISEASE INVOLVING NATIVE CORONARY ARTERY: Status: ACTIVE | Noted: 2019-01-10

## 2019-01-10 LAB
ANION GAP SERPL CALCULATED.3IONS-SCNC: 3 MMOL/L (ref 4–13)
APTT PPP: 58 SECONDS (ref 26–38)
BASOPHILS # BLD AUTO: 0.06 THOUSANDS/ΜL (ref 0–0.1)
BASOPHILS NFR BLD AUTO: 1 % (ref 0–1)
BUN SERPL-MCNC: 27 MG/DL (ref 5–25)
CALCIUM SERPL-MCNC: 8.8 MG/DL (ref 8.3–10.1)
CHLORIDE SERPL-SCNC: 94 MMOL/L (ref 100–108)
CHOLEST SERPL-MCNC: 119 MG/DL (ref 50–200)
CO2 SERPL-SCNC: 30 MMOL/L (ref 21–32)
CREAT SERPL-MCNC: 3.51 MG/DL (ref 0.6–1.3)
EOSINOPHIL # BLD AUTO: 0.06 THOUSAND/ΜL (ref 0–0.61)
EOSINOPHIL NFR BLD AUTO: 1 % (ref 0–6)
ERYTHROCYTE [DISTWIDTH] IN BLOOD BY AUTOMATED COUNT: 16.7 % (ref 11.6–15.1)
GFR SERPL CREATININE-BSD FRML MDRD: 17 ML/MIN/1.73SQ M
GLUCOSE SERPL-MCNC: 177 MG/DL (ref 65–140)
GLUCOSE SERPL-MCNC: 182 MG/DL (ref 65–140)
GLUCOSE SERPL-MCNC: 184 MG/DL (ref 65–140)
GLUCOSE SERPL-MCNC: 192 MG/DL (ref 65–140)
GLUCOSE SERPL-MCNC: 205 MG/DL (ref 65–140)
GLUCOSE SERPL-MCNC: 216 MG/DL (ref 65–140)
HCT VFR BLD AUTO: 31.5 % (ref 36.5–49.3)
HDLC SERPL-MCNC: 48 MG/DL (ref 40–60)
HGB BLD-MCNC: 9.7 G/DL (ref 12–17)
IMM GRANULOCYTES # BLD AUTO: 0.07 THOUSAND/UL (ref 0–0.2)
IMM GRANULOCYTES NFR BLD AUTO: 1 % (ref 0–2)
LDLC SERPL CALC-MCNC: 58 MG/DL (ref 0–100)
LYMPHOCYTES # BLD AUTO: 1.68 THOUSANDS/ΜL (ref 0.6–4.47)
LYMPHOCYTES NFR BLD AUTO: 17 % (ref 14–44)
MAGNESIUM SERPL-MCNC: 2.3 MG/DL (ref 1.6–2.6)
MCH RBC QN AUTO: 25.9 PG (ref 26.8–34.3)
MCHC RBC AUTO-ENTMCNC: 30.8 G/DL (ref 31.4–37.4)
MCV RBC AUTO: 84 FL (ref 82–98)
MONOCYTES # BLD AUTO: 0.82 THOUSAND/ΜL (ref 0.17–1.22)
MONOCYTES NFR BLD AUTO: 8 % (ref 4–12)
MRSA NOSE QL CULT: NORMAL
NEUTROPHILS # BLD AUTO: 7.24 THOUSANDS/ΜL (ref 1.85–7.62)
NEUTS SEG NFR BLD AUTO: 72 % (ref 43–75)
NONHDLC SERPL-MCNC: 71 MG/DL
NRBC BLD AUTO-RTO: 0 /100 WBCS
PHOSPHATE SERPL-MCNC: 2.2 MG/DL (ref 2.3–4.1)
PLATELET # BLD AUTO: 118 THOUSANDS/UL (ref 149–390)
PMV BLD AUTO: 12.5 FL (ref 8.9–12.7)
POTASSIUM SERPL-SCNC: 4.8 MMOL/L (ref 3.5–5.3)
PREALB SERPL-MCNC: 24 MG/DL (ref 18–40)
RBC # BLD AUTO: 3.75 MILLION/UL (ref 3.88–5.62)
SODIUM SERPL-SCNC: 127 MMOL/L (ref 136–145)
TRIGL SERPL-MCNC: 65 MG/DL
WBC # BLD AUTO: 9.93 THOUSAND/UL (ref 4.31–10.16)

## 2019-01-10 PROCEDURE — 93880 EXTRACRANIAL BILAT STUDY: CPT | Performed by: SURGERY

## 2019-01-10 PROCEDURE — 80048 BASIC METABOLIC PNL TOTAL CA: CPT | Performed by: INTERNAL MEDICINE

## 2019-01-10 PROCEDURE — 85025 COMPLETE CBC W/AUTO DIFF WBC: CPT | Performed by: INTERNAL MEDICINE

## 2019-01-10 PROCEDURE — G8982 BODY POS GOAL STATUS: HCPCS

## 2019-01-10 PROCEDURE — 82948 REAGENT STRIP/BLOOD GLUCOSE: CPT

## 2019-01-10 PROCEDURE — 92526 ORAL FUNCTION THERAPY: CPT

## 2019-01-10 PROCEDURE — 99232 SBSQ HOSP IP/OBS MODERATE 35: CPT | Performed by: THORACIC SURGERY (CARDIOTHORACIC VASCULAR SURGERY)

## 2019-01-10 PROCEDURE — 43235 EGD DIAGNOSTIC BRUSH WASH: CPT | Performed by: INTERNAL MEDICINE

## 2019-01-10 PROCEDURE — 93320 DOPPLER ECHO COMPLETE: CPT | Performed by: INTERNAL MEDICINE

## 2019-01-10 PROCEDURE — G8981 BODY POS CURRENT STATUS: HCPCS

## 2019-01-10 PROCEDURE — 0DJ08ZZ INSPECTION OF UPPER INTESTINAL TRACT, VIA NATURAL OR ARTIFICIAL OPENING ENDOSCOPIC: ICD-10-PCS | Performed by: INTERNAL MEDICINE

## 2019-01-10 PROCEDURE — 93971 EXTREMITY STUDY: CPT | Performed by: SURGERY

## 2019-01-10 PROCEDURE — 84134 ASSAY OF PREALBUMIN: CPT | Performed by: PHYSICIAN ASSISTANT

## 2019-01-10 PROCEDURE — 99232 SBSQ HOSP IP/OBS MODERATE 35: CPT | Performed by: INTERNAL MEDICINE

## 2019-01-10 PROCEDURE — 97163 PT EVAL HIGH COMPLEX 45 MIN: CPT

## 2019-01-10 PROCEDURE — 84100 ASSAY OF PHOSPHORUS: CPT | Performed by: NURSE PRACTITIONER

## 2019-01-10 PROCEDURE — 93312 ECHO TRANSESOPHAGEAL: CPT | Performed by: INTERNAL MEDICINE

## 2019-01-10 PROCEDURE — 93312 ECHO TRANSESOPHAGEAL: CPT

## 2019-01-10 PROCEDURE — 80061 LIPID PANEL: CPT | Performed by: PHYSICIAN ASSISTANT

## 2019-01-10 PROCEDURE — 93325 DOPPLER ECHO COLOR FLOW MAPG: CPT | Performed by: INTERNAL MEDICINE

## 2019-01-10 PROCEDURE — 85730 THROMBOPLASTIN TIME PARTIAL: CPT | Performed by: INTERNAL MEDICINE

## 2019-01-10 PROCEDURE — 83735 ASSAY OF MAGNESIUM: CPT | Performed by: NURSE PRACTITIONER

## 2019-01-10 RX ORDER — LORAZEPAM 2 MG/ML
1 INJECTION INTRAMUSCULAR ONCE
Status: COMPLETED | OUTPATIENT
Start: 2019-01-10 | End: 2019-01-10

## 2019-01-10 RX ORDER — PROPOFOL 10 MG/ML
INJECTION, EMULSION INTRAVENOUS AS NEEDED
Status: DISCONTINUED | OUTPATIENT
Start: 2019-01-10 | End: 2019-01-10 | Stop reason: SURG

## 2019-01-10 RX ORDER — PROPOFOL 10 MG/ML
INJECTION, EMULSION INTRAVENOUS CONTINUOUS PRN
Status: DISCONTINUED | OUTPATIENT
Start: 2019-01-10 | End: 2019-01-10 | Stop reason: SURG

## 2019-01-10 RX ORDER — INSULIN GLARGINE 100 [IU]/ML
24 INJECTION, SOLUTION SUBCUTANEOUS
Status: DISCONTINUED | OUTPATIENT
Start: 2019-01-10 | End: 2019-01-16 | Stop reason: HOSPADM

## 2019-01-10 RX ORDER — SODIUM CHLORIDE 9 MG/ML
INJECTION, SOLUTION INTRAVENOUS CONTINUOUS PRN
Status: DISCONTINUED | OUTPATIENT
Start: 2019-01-10 | End: 2019-01-10 | Stop reason: SURG

## 2019-01-10 RX ADMIN — METOPROLOL TARTRATE 25 MG: 25 TABLET, FILM COATED ORAL at 21:09

## 2019-01-10 RX ADMIN — INSULIN LISPRO 2 UNITS: 100 INJECTION, SOLUTION INTRAVENOUS; SUBCUTANEOUS at 17:26

## 2019-01-10 RX ADMIN — CALCIUM ACETATE 667 MG: 667 CAPSULE ORAL at 17:26

## 2019-01-10 RX ADMIN — HEPARIN SODIUM 2000 UNITS: 1000 INJECTION INTRAVENOUS; SUBCUTANEOUS at 02:27

## 2019-01-10 RX ADMIN — LORAZEPAM 1 MG: 2 INJECTION, SOLUTION INTRAMUSCULAR; INTRAVENOUS at 09:56

## 2019-01-10 RX ADMIN — BENZONATATE 100 MG: 100 CAPSULE ORAL at 17:26

## 2019-01-10 RX ADMIN — BENZONATATE 100 MG: 100 CAPSULE ORAL at 21:09

## 2019-01-10 RX ADMIN — PROPOFOL 20 MG: 10 INJECTION, EMULSION INTRAVENOUS at 15:50

## 2019-01-10 RX ADMIN — SODIUM CHLORIDE: 9 INJECTION, SOLUTION INTRAVENOUS at 15:38

## 2019-01-10 RX ADMIN — FUROSEMIDE 40 MG: 40 TABLET ORAL at 17:26

## 2019-01-10 RX ADMIN — Medication 1 CAPSULE: at 17:27

## 2019-01-10 RX ADMIN — TOPICAL ANESTHETIC 1 SPRAY: 200 SPRAY DENTAL; PERIODONTAL at 15:41

## 2019-01-10 RX ADMIN — INSULIN GLARGINE 24 UNITS: 100 INJECTION, SOLUTION SUBCUTANEOUS at 21:10

## 2019-01-10 RX ADMIN — PANTOPRAZOLE SODIUM 40 MG: 40 TABLET, DELAYED RELEASE ORAL at 05:02

## 2019-01-10 RX ADMIN — PROPOFOL 50 MG: 10 INJECTION, EMULSION INTRAVENOUS at 15:41

## 2019-01-10 RX ADMIN — INSULIN LISPRO 1 UNITS: 100 INJECTION, SOLUTION INTRAVENOUS; SUBCUTANEOUS at 21:10

## 2019-01-10 RX ADMIN — MELATONIN 3 MG: at 21:09

## 2019-01-10 RX ADMIN — PROPOFOL 50 MCG/KG/MIN: 10 INJECTION, EMULSION INTRAVENOUS at 15:42

## 2019-01-10 RX ADMIN — ATORVASTATIN CALCIUM 40 MG: 40 TABLET, FILM COATED ORAL at 17:26

## 2019-01-10 RX ADMIN — CHLORHEXIDINE GLUCONATE 0.12% ORAL RINSE 15 ML: 1.2 LIQUID ORAL at 21:09

## 2019-01-10 RX ADMIN — INSULIN LISPRO 2 UNITS: 100 INJECTION, SOLUTION INTRAVENOUS; SUBCUTANEOUS at 06:40

## 2019-01-10 NOTE — ANESTHESIA POSTPROCEDURE EVALUATION
Post-Op Assessment Note      CV Status:  Stable    Mental Status:  Awake    Hydration Status:  Euvolemic    PONV Controlled:  Controlled    Airway Patency:  Patent    Post Op Vitals Reviewed: Yes          Staff: CRNA           /86 (01/10/19 1617)    Temp     Pulse 86 (01/10/19 1617)   Resp 18 (01/10/19 1617)    SpO2 100 % (01/10/19 1617)

## 2019-01-10 NOTE — PROGRESS NOTES
Progress Note - Cardiology   Demi Arambula 72 y o  male MRN: [id number]  Unit/Bed#: [facility] 403-01 Encounter: [id number]        Principal Problem:    MI, acute, non ST segment elevation ([facility] 75 )  Active Problems:    NATALIIA (acute kidney injury) ([facility] )    Acute on chronic congestive heart failure ([facility] 75 )    Type 2 diabetes mellitus with kidney complication, with long-term current use of insulin ([facility] 75 )    Dysphagia    Weight loss    Coronary artery disease involving native coronary artery        Patient was transferred from [facility] for CT surgery evaluation for multivessel coronary artery disease  He presented there with shortness of breath, edema and orthopnea  He was initiated on intravenous diuretics  Pt came from [place] to seek medical care  Reports that he lost 20 lbs in 6 months  He is unable to eat because of no hunger  He feels weak, debilitated       Assessment/Plan     1  Multivessel coronary artery disease  Status post cardiac catheterization 1/7-prox left main 90% left PDA 90% distal left main 50%  On aspirin 81, atorvastatin 40, Lopressor 25 b i d  Remains on IV heparin  This Bystolic for his procedure this morning as needed  Discussed with attending, will D/C  Today  Sent from [facility] for CT surgical evaluation  Being worked up ? Candidate     2  NSTEMI -likely type 2 secondary to heart failure in the setting of multivessel coronary artery disease        2  Acute on chronic systolic heart failure  Echocardiogram-EF 35% with severe diffuse hypokinesis  RV mildly dilated  Moderate MR  Mild TR  Estimated peak PA pressure 60 mm Hg  Volume management per Nephrology/HD  Also on Lasix 40 b i d  Hypotensive with HD yesterday, NSS given, treatment ended  For attempt at HD tomorrow  Shortness of breath is better today        3  Acute kidney injury/CKD-required hemodialysis  Creatinine on presentation 3 25 which peaked post catheterization 4 21    4   Moderate MR- for MARQUISE today       5  Diabetes mellitus type 2-hemoglobin A1c to 8 2%     6  Anemia- suspect chronic disease  No obvious signs of bleeding  For outpatient colonoscopy      7  HTN- controlled     8  Debilitated/weakness/poor nutritional status -patient overall reports a decline in his health in the last 6 months  He said he moved from [place] to seek medical attention  [place] then moved to [facility] which was to be permanently  He said he is having difficulty eating the past 6 months because he has no taste and no appetite and dysphagia  He wants quality of life more than he wants longevity  Rec- PT evaluation  8  Dysphagia and weight loss  For EGD today with MARQUISE ( as recommended by CT surgery)    Subjective/Objective   Chief Complaint/Subjective  Seems a bit confused this morning  I am having a hard time about a conversation with him    He was given Ativan this morning by his nurse because he was agitated about not having breakfast       Vitals: /75 (BP Location: Right arm) Comment: Map 101  Pulse 88   Temp 98 4 °F (36 9 °C) (Oral)   Resp 18   Wt 62 4 kg (137 lb 9 1 oz)   SpO2 100%   BMI 22 89 kg/m²     Vitals:    01/09/19 0500 01/10/19 0600   Weight: 62 8 kg (138 lb 8 oz) 62 4 kg (137 lb 9 1 oz)     Orthostatic Blood Pressures      Most Recent Value   Blood Pressure  136/75 [Map 101] filed at 01/10/2019 2950   Patient Position - Orthostatic VS  Lying filed at 01/10/2019 8382            Intake/Output Summary (Last 24 hours) at 01/10/19 1016  Last data filed at 01/10/19 0000   Gross per 24 hour   Intake          1041 25 ml   Output             2261 ml   Net         -1219 75 ml       Invasive Devices     Peripheral Intravenous Line            Peripheral IV 01/09/19 Left Forearm less than 1 day          Hemodialysis Catheter            Permanent HD Catheter  6 days                Current Facility-Administered Medications   Medication Dose Route Frequency    acetaminophen (TYLENOL) tablet 650 mg 650 mg Oral Q6H PRN    aspirin chewable tablet 81 mg  81 mg Oral Daily    atorvastatin (LIPITOR) tablet 40 mg  40 mg Oral Daily With Dinner    b complex-vitamin C-folic acid (NEPHROCAPS) capsule 1 capsule  1 capsule Oral Daily With Dinner    benzonatate (TESSALON PERLES) capsule 100 mg  100 mg Oral TID    calcium acetate (PHOSLO) capsule 667 mg  667 mg Oral TID With Meals    chlorhexidine (PERIDEX) 0 12 % oral rinse 15 mL  15 mL Swish & Spit Q12H [address]    epoetin deepa (EPOGEN,PROCRIT) injection 2,000 Units  2,000 Units Intravenous After Dialysis    And    epoetin deepa (EPOGEN,PROCRIT) injection 3,000 Units  3,000 Units Intravenous After Dialysis    furosemide (LASIX) tablet 40 mg  40 mg Oral BID (diuretic)    heparin (porcine) 25,000 units in 250 mL infusion (premix)  3-20 Units/kg/hr (Order-Specific) Intravenous Titrated    heparin (porcine) injection 2,000 Units  2,000 Units Intravenous PRN    heparin (porcine) injection 4,000 Units  4,000 Units Intravenous PRN    insulin glargine (LANTUS) subcutaneous injection 20 Units 0 2 mL  20 Units Subcutaneous HS    insulin lispro (HumaLOG) 100 units/mL subcutaneous injection 1-5 Units  1-5 Units Subcutaneous HS    insulin lispro (HumaLOG) 100 units/mL subcutaneous injection 1-6 Units  1-6 Units Subcutaneous TID AC    insulin lispro (HumaLOG) 100 units/mL subcutaneous injection 8 Units  8 Units Subcutaneous TID With Meals    melatonin tablet 3 mg  3 mg Oral HS    metoprolol tartrate (LOPRESSOR) tablet 25 mg  25 mg Oral Q12H JUAN C    mupirocin (BACTROBAN) 2 % nasal ointment 1 application  1 application Nasal W09G [address]    ondansetron (ZOFRAN) injection 4 mg  4 mg Intravenous Q6H PRN    pantoprazole (PROTONIX) EC tablet 40 mg  40 mg Oral Early Morning         Physical Exam: /75 (BP Location: Right arm) Comment: Map 101  Pulse 88   Temp 98 4 °F (36 9 °C) (Oral)   Resp 18   Wt 62 4 kg (137 lb 9 1 oz)   SpO2 100%   BMI 22 89 kg/m²     General Appearance:    Alert, cooperative, no distress, appears stated age   Head:    Normocephalic, no scleral icterus   Eyes:    PERRL   Nose:   Nares normal, septum midline, no drainage    Throat:   Lips, mucosa, and tongue normal           Lungs:     Clear to auscultation bilaterally, respirations unlabored        Heart:    Regular rate and rhythm, S1 and S2 normal, no murmur, rub   or gallop       Extremities:   Extremities normal, atraumatic, no cyanosis or edema   Pulses:   2+ and symmetric all extremities   Skin:   Skin color, texture, turgor normal, no rashes or lesions   Neurologic:   Alert and oriented to person place and time                 Lab Results:   Recent Results (from the past 72 hour(s))   APTT    Collection Time: 01/07/19  9:11 PM   Result Value Ref Range    PTT 47 (H) 26 - 38 seconds   APTT    Collection Time: 01/08/19  4:49 AM   Result Value Ref Range    PTT 95 (H) 26 - 38 seconds   Basic metabolic panel    Collection Time: 01/08/19  4:49 AM   Result Value Ref Range    Sodium 129 (L) 136 - 145 mmol/L    Potassium 5 1 3 5 - 5 3 mmol/L    Chloride 92 (L) 100 - 108 mmol/L    CO2 35 (H) 21 - 32 mmol/L    ANION GAP 2 (L) 4 - 13 mmol/L    BUN 28 (H) 5 - 25 mg/dL    Creatinine 3 48 (H) 0 60 - 1 30 mg/dL    Glucose 448 (H) 65 - 140 mg/dL    Calcium 8 7 8 3 - 10 1 mg/dL    eGFR 17 ml/min/1 73sq m   CBC and differential    Collection Time: 01/08/19  4:49 AM   Result Value Ref Range    WBC 8 74 4 31 - 10 16 Thousand/uL    RBC 3 84 (L) 3 88 - 5 62 Million/uL    Hemoglobin 10 0 (L) 12 0 - 17 0 g/dL    Hematocrit 32 0 (L) 36 5 - 49 3 %    MCV 83 82 - 98 fL    MCH 26 0 (L) 26 8 - 34 3 pg    MCHC 31 3 (L) 31 4 - 37 4 g/dL    RDW 16 6 (H) 11 6 - 15 1 %    MPV 10 8 8 9 - 12 7 fL    Platelets 366 (L) 810 - 390 Thousands/uL    nRBC 0 /100 WBCs    Neutrophils Relative 72 43 - 75 %    Immat GRANS % 1 0 - 2 %    Lymphocytes Relative 18 14 - 44 %    Monocytes Relative 8 4 - 12 %    Eosinophils Relative 0 0 - 6 % Basophils Relative 1 0 - 1 %    Neutrophils Absolute 6 39 1 85 - 7 62 Thousands/µL    Immature Grans Absolute 0 04 0 00 - 0 20 Thousand/uL    Lymphocytes Absolute 1 54 0 60 - 4 47 Thousands/µL    Monocytes Absolute 0 70 0 17 - 1 22 Thousand/µL    Eosinophils Absolute 0 03 0 00 - 0 61 Thousand/µL    Basophils Absolute 0 04 0 00 - 0 10 Thousands/µL   APTT    Collection Time: 01/08/19 12:50 PM   Result Value Ref Range    PTT 55 (H) 26 - 38 seconds   Fingerstick Glucose (POCT)    Collection Time: 01/08/19  2:40 PM   Result Value Ref Range    POC Glucose 449 (H) 65 - 140 mg/dl   Fingerstick Glucose (POCT)    Collection Time: 01/08/19  3:40 PM   Result Value Ref Range    POC Glucose 469 (H) 65 - 140 mg/dl   Fingerstick Glucose (POCT)    Collection Time: 01/08/19  7:50 PM   Result Value Ref Range    POC Glucose 381 (H) 65 - 140 mg/dl   APTT    Collection Time: 01/08/19  7:53 PM   Result Value Ref Range    PTT 59 (H) 26 - 38 seconds   Basic metabolic panel    Collection Time: 01/08/19  7:54 PM   Result Value Ref Range    Sodium 125 (L) 136 - 145 mmol/L    Potassium 4 8 3 5 - 5 3 mmol/L    Chloride 85 (L) 100 - 108 mmol/L    CO2 36 (H) 21 - 32 mmol/L    ANION GAP 4 4 - 13 mmol/L    BUN 36 (H) 5 - 25 mg/dL    Creatinine 4 03 (H) 0 60 - 1 30 mg/dL    Glucose 356 (H) 65 - 140 mg/dL    Calcium 9 0 8 3 - 10 1 mg/dL    eGFR 14 ml/min/1 73sq m   CBC    Collection Time: 01/08/19  7:54 PM   Result Value Ref Range    WBC 9 64 4 31 - 10 16 Thousand/uL    RBC 3 90 3 88 - 5 62 Million/uL    Hemoglobin 10 0 (L) 12 0 - 17 0 g/dL    Hematocrit 32 6 (L) 36 5 - 49 3 %    MCV 84 82 - 98 fL    MCH 25 6 (L) 26 8 - 34 3 pg    MCHC 30 7 (L) 31 4 - 37 4 g/dL    RDW 16 4 (H) 11 6 - 15 1 %    Platelets 293 (L) 887 - 390 Thousands/uL    MPV 12 0 8 9 - 12 7 fL   Fingerstick Glucose (POCT)    Collection Time: 01/08/19  9:10 PM   Result Value Ref Range    POC Glucose 397 (H) 65 - 140 mg/dl   APTT    Collection Time: 01/09/19  3:55 AM   Result Value Ref Range    PTT 44 (H) 26 - 38 seconds   Basic metabolic panel    Collection Time: 01/09/19  3:55 AM   Result Value Ref Range    Sodium 127 (L) 136 - 145 mmol/L    Potassium 4 5 3 5 - 5 3 mmol/L    Chloride 88 (L) 100 - 108 mmol/L    CO2 33 (H) 21 - 32 mmol/L    ANION GAP 6 4 - 13 mmol/L    BUN 37 (H) 5 - 25 mg/dL    Creatinine 4 19 (H) 0 60 - 1 30 mg/dL    Glucose 212 (H) 65 - 140 mg/dL    Calcium 9 0 8 3 - 10 1 mg/dL    eGFR 13 ml/min/1 73sq m   Rapid HIV 1/2 AB-AG Combo    Collection Time: 01/09/19  3:55 AM   Result Value Ref Range    Rapid HIV 1 AND 2 Non-Reactive Non-Reactive    HIV-1 P24 Ag Screen Non-Reactive Non-Reactive   CBC and differential    Collection Time: 01/09/19  3:56 AM   Result Value Ref Range    WBC 9 61 4 31 - 10 16 Thousand/uL    RBC 3 68 (L) 3 88 - 5 62 Million/uL    Hemoglobin 9 6 (L) 12 0 - 17 0 g/dL    Hematocrit 30 7 (L) 36 5 - 49 3 %    MCV 83 82 - 98 fL    MCH 26 1 (L) 26 8 - 34 3 pg    MCHC 31 3 (L) 31 4 - 37 4 g/dL    RDW 16 3 (H) 11 6 - 15 1 %    MPV 11 5 8 9 - 12 7 fL    Platelets 471 (L) 870 - 390 Thousands/uL    nRBC 0 /100 WBCs    Neutrophils Relative 74 43 - 75 %    Immat GRANS % 0 0 - 2 %    Lymphocytes Relative 17 14 - 44 %    Monocytes Relative 8 4 - 12 %    Eosinophils Relative 1 0 - 6 %    Basophils Relative 0 0 - 1 %    Neutrophils Absolute 7 07 1 85 - 7 62 Thousands/µL    Immature Grans Absolute 0 03 0 00 - 0 20 Thousand/uL    Lymphocytes Absolute 1 60 0 60 - 4 47 Thousands/µL    Monocytes Absolute 0 79 0 17 - 1 22 Thousand/µL    Eosinophils Absolute 0 08 0 00 - 0 61 Thousand/µL    Basophils Absolute 0 04 0 00 - 0 10 Thousands/µL   Hepatitis B surface antigen    Collection Time: 01/09/19  3:56 AM   Result Value Ref Range    Hepatitis B Surface Ag Non-reactive Non-reactive, NonReactive - Confirmed   Hepatitis C antibody    Collection Time: 01/09/19  3:56 AM   Result Value Ref Range    Hepatitis C Ab Non-reactive Non-reactive   Fingerstick Glucose (POCT)    Collection Time: 01/09/19  6:15 AM   Result Value Ref Range    POC Glucose 246 (H) 65 - 140 mg/dl   Fingerstick Glucose (POCT)    Collection Time: 01/09/19 10:53 AM   Result Value Ref Range    POC Glucose 273 (H) 65 - 140 mg/dl   APTT    Collection Time: 01/09/19 11:40 AM   Result Value Ref Range    PTT 96 (H) 26 - 38 seconds   Basic metabolic panel    Collection Time: 01/09/19  2:00 PM   Result Value Ref Range    Sodium 127 (L) 136 - 145 mmol/L    Potassium 4 4 3 5 - 5 3 mmol/L    Chloride 87 (L) 100 - 108 mmol/L    CO2 33 (H) 21 - 32 mmol/L    ANION GAP 7 4 - 13 mmol/L    BUN 40 (H) 5 - 25 mg/dL    Creatinine 4 48 (H) 0 60 - 1 30 mg/dL    Glucose 184 (H) 65 - 140 mg/dL    Calcium 9 1 8 3 - 10 1 mg/dL    eGFR 12 ml/min/1 73sq m   CBC and differential    Collection Time: 01/09/19  2:00 PM   Result Value Ref Range    WBC 10 13 4 31 - 10 16 Thousand/uL    RBC 3 53 (L) 3 88 - 5 62 Million/uL    Hemoglobin 9 2 (L) 12 0 - 17 0 g/dL    Hematocrit 29 1 (L) 36 5 - 49 3 %    MCV 82 82 - 98 fL    MCH 26 1 (L) 26 8 - 34 3 pg    MCHC 31 6 31 4 - 37 4 g/dL    RDW 16 5 (H) 11 6 - 15 1 %    MPV 12 2 8 9 - 12 7 fL    Platelets 442 (L) 783 - 390 Thousands/uL    nRBC 0 /100 WBCs    Neutrophils Relative 74 43 - 75 %    Immat GRANS % 0 0 - 2 %    Lymphocytes Relative 17 14 - 44 %    Monocytes Relative 8 4 - 12 %    Eosinophils Relative 1 0 - 6 %    Basophils Relative 0 0 - 1 %    Neutrophils Absolute 7 53 1 85 - 7 62 Thousands/µL    Immature Grans Absolute 0 03 0 00 - 0 20 Thousand/uL    Lymphocytes Absolute 1 68 0 60 - 4 47 Thousands/µL    Monocytes Absolute 0 77 0 17 - 1 22 Thousand/µL    Eosinophils Absolute 0 09 0 00 - 0 61 Thousand/µL    Basophils Absolute 0 03 0 00 - 0 10 Thousands/µL   Fingerstick Glucose (POCT)    Collection Time: 01/09/19  4:18 PM   Result Value Ref Range    POC Glucose 114 65 - 140 mg/dl   APTT    Collection Time: 01/09/19  6:52 PM   Result Value Ref Range    PTT 64 (H) 26 - 38 seconds   Fingerstick Glucose (POCT) Collection Time: 01/09/19  9:26 PM   Result Value Ref Range    POC Glucose 174 (H) 65 - 140 mg/dl   APTT    Collection Time: 01/10/19  2:32 AM   Result Value Ref Range    PTT 58 (H) 26 - 38 seconds   Fingerstick Glucose (POCT)    Collection Time: 01/10/19  3:10 AM   Result Value Ref Range    POC Glucose 205 (H) 65 - 140 mg/dl   Basic metabolic panel    Collection Time: 01/10/19  4:42 AM   Result Value Ref Range    Sodium 127 (L) 136 - 145 mmol/L    Potassium 4 8 3 5 - 5 3 mmol/L    Chloride 94 (L) 100 - 108 mmol/L    CO2 30 21 - 32 mmol/L    ANION GAP 3 (L) 4 - 13 mmol/L    BUN 27 (H) 5 - 25 mg/dL    Creatinine 3 51 (H) 0 60 - 1 30 mg/dL    Glucose 177 (H) 65 - 140 mg/dL    Calcium 8 8 8 3 - 10 1 mg/dL    eGFR 17 ml/min/1 73sq m   CBC and differential    Collection Time: 01/10/19  4:42 AM   Result Value Ref Range    WBC 9 93 4 31 - 10 16 Thousand/uL    RBC 3 75 (L) 3 88 - 5 62 Million/uL    Hemoglobin 9 7 (L) 12 0 - 17 0 g/dL    Hematocrit 31 5 (L) 36 5 - 49 3 %    MCV 84 82 - 98 fL    MCH 25 9 (L) 26 8 - 34 3 pg    MCHC 30 8 (L) 31 4 - 37 4 g/dL    RDW 16 7 (H) 11 6 - 15 1 %    MPV 12 5 8 9 - 12 7 fL    Platelets 637 (L) 314 - 390 Thousands/uL    nRBC 0 /100 WBCs    Neutrophils Relative 72 43 - 75 %    Immat GRANS % 1 0 - 2 %    Lymphocytes Relative 17 14 - 44 %    Monocytes Relative 8 4 - 12 %    Eosinophils Relative 1 0 - 6 %    Basophils Relative 1 0 - 1 %    Neutrophils Absolute 7 24 1 85 - 7 62 Thousands/µL    Immature Grans Absolute 0 07 0 00 - 0 20 Thousand/uL    Lymphocytes Absolute 1 68 0 60 - 4 47 Thousands/µL    Monocytes Absolute 0 82 0 17 - 1 22 Thousand/µL    Eosinophils Absolute 0 06 0 00 - 0 61 Thousand/µL    Basophils Absolute 0 06 0 00 - 0 10 Thousands/µL   Phosphorus    Collection Time: 01/10/19  4:42 AM   Result Value Ref Range    Phosphorus 2 2 (L) 2 3 - 4 1 mg/dL   Magnesium    Collection Time: 01/10/19  4:42 AM   Result Value Ref Range    Magnesium 2 3 1 6 - 2 6 mg/dL   Lipid panel Collection Time: 01/10/19  4:42 AM   Result Value Ref Range    Cholesterol 119 50 - 200 mg/dL    Triglycerides 65 <=150 mg/dL    HDL, Direct 48 40 - 60 mg/dL    LDL Calculated 58 0 - 100 mg/dL    Non-HDL-Chol (CHOL-HDL) 71 mg/dl   Prealbumin    Collection Time: 01/10/19  4:42 AM   Result Value Ref Range    Prealbumin 24 0 18 0 - 40 0 mg/dL   Fingerstick Glucose (POCT)    Collection Time: 01/10/19  5:43 AM   Result Value Ref Range    POC Glucose 216 (H) 65 - 140 mg/dl     [address] [place] [address]  [phone]     [facility]     Invasive Cardiovascular Lab Complete Report     Patient: Christo Mendoza  MR number: [id number]  Account number: [de-identified]  Study date: 2019  Gender: Male  : 1945  Height: 65 in  Weight: 165 lb  BSA: 1 82 m squared     Allergies: NO KNOWN ALLERGIES     Interventional Cardiologist:  Tristan Driscoll MD     SUMMARY     CORONARY CIRCULATION:  Distal left main: There was a tubular 50 % stenosis  Proximal LAD: There was a tubular 90 % stenosis  2nd diagonal: The vessel was small sized  Angiography showed minor luminal irregularities  Ostial circumflex: The vessel was large sized (dominant)  2nd obtuse marginal: The vessel was small sized  Angiography showed mild atherosclerosis  3rd obtuse marginal: The vessel was small sized  Angiography showed mild atherosclerosis  Left posterior descending artery: There was a 90 % stenosis      HEMODYNAMICS:  Hemodynamic assessment demonstrated mildly to moderately elevated LVEDP      RECOMMENDATIONS:  Consultation with a cardiac surgeon should be obtained for coronary artery bypass grafting      [address] [place] [id number]  [phone]     Transthoracic Echocardiogram  Limited 2D, Doppler, and Color Doppler     Study date:  2019     Patient: Christo Mendoza  MR number: [id number]  Account number: [de-identified]  : [id number]  Age: 72 years  Gender: Male  Status: Inpatient  Location: Bedside  Height: 65 in  Weight: 167 lb  BP: 168/ 77 mmHg     Indications: Cardiomyopathy     Diagnoses: I42 9 - Cardiomyopathy, unspecified     Sonographer:  Rachel Laguna RDCS  Referring Physician:  Sammy Veras PA-C  Group:  [facility]  Interpreting Physician:  Negrito Zaidi MD     SUMMARY     LEFT VENTRICLE:  Systolic function was normal  Ejection fraction was estimated to be 35 %  There was severe diffuse hypokinesis      RIGHT VENTRICLE:  The ventricle was mildly dilated      MITRAL VALVE:  There was moderate regurgitation      AORTIC VALVE:  The valve was trileaflet  Leaflets exhibited mild calcification and normal cuspal separation      TRICUSPID VALVE:  There was mild regurgitation  Estimated peak PA pressure was 60 mmHg  [address], [place] [id number] [phone]     Transthoracic Echocardiogram  Limited 2D, Doppler, and Color Doppler     Study date:  2019     Patient: Yvette Pruitt  MR number: [id number]  Account number: [de-identified]  : 1945  Age: 72 years  Gender: Male  Status: Inpatient  Location: Bedside  Height: 65 in  Weight: 167 lb  BP: 168/ 77 mmHg     Indications: Cardiomyopathy     Diagnoses: I42 9 - Cardiomyopathy, unspecified     Sonographer:  Rachel Laguna RDCS  Referring Physician:  Sammy Veras PA-C  Group:  [facility]  Interpreting Physician:  Negrito Zaidi MD     SUMMARY     LEFT VENTRICLE:  Systolic function was normal  Ejection fraction was estimated to be 35 %  There was severe diffuse hypokinesis      RIGHT VENTRICLE:  The ventricle was mildly dilated      MITRAL VALVE:  There was moderate regurgitation      AORTIC VALVE:  The valve was trileaflet  Leaflets exhibited mild calcification and normal cuspal separation      TRICUSPID VALVE:  There was mild regurgitation    Estimated peak PA pressure was 60 mmHg      HISTORY: PRIOR HISTORY: Myocardial infarction  Congestive heart failure  Cardiomyopathy  Risk factors: CKD, chest pain, hypertension, diabetes, and hypercholesterolemia      PROCEDURE: The procedure was performed at the bedside  This was a routine study  The transthoracic approach was used  The study included limited 2D imaging, limited spectral Doppler, and color Doppler  The heart rate was 98 bpm, at the  start of the study  Images were obtained from the parasternal, apical, and subcostal acoustic windows  Image quality was adequate      LEFT VENTRICLE: Size was normal  Systolic function was normal  Ejection fraction was estimated to be 35 %  There was severe diffuse hypokinesis  Wall thickness was normal  DOPPLER: Left ventricular diastolic function parameters were  abnormal      RIGHT VENTRICLE: The ventricle was mildly dilated  Systolic function was normal  Wall thickness was normal      LEFT ATRIUM: Size was normal      RIGHT ATRIUM: Size was normal      MITRAL VALVE: Valve structure was normal  There was normal leaflet separation  DOPPLER: The transmitral velocity was within the normal range  There was no evidence for stenosis  There was moderate regurgitation      AORTIC VALVE: The valve was trileaflet  Leaflets exhibited mild calcification and normal cuspal separation  The valve was not well visualized  DOPPLER: Transaortic velocity was within the normal range  There was no evidence for stenosis  There was no regurgitation      TRICUSPID VALVE: The valve structure was normal  There was normal leaflet separation  DOPPLER: The transtricuspid velocity was within the normal range  There was no evidence for stenosis  There was mild regurgitation  Estimated peak PA  pressure was 60 mmHg      PULMONIC VALVE: Leaflets exhibited normal thickness, no calcification, and normal cuspal separation  DOPPLER: The transpulmonic velocity was within the normal range   There was no regurgitation      PERICARDIUM: There was no pericardial effusion  The pericardium was normal in appearance      AORTA: The root exhibited normal size      SYSTEMIC VEINS: IVC: The inferior vena cava was normal in size  Respirophasic changes were normal      SYSTEM MEASUREMENT TABLES    Imaging: I have personally reviewed pertinent reports  Tele- NSR  VTE Pharmacologic Prophylaxis: Heparin  VTE Mechanical Prophylaxis: sequential compression device and foot pump applied    Counseling / Coordination of Care  Total time spent today 25 minutes  Greater than 50% of total time was spent with the patient and / or family counseling and / or coordination of care

## 2019-01-10 NOTE — SPEECH THERAPY NOTE
Speech Language/Pathology    Speech/Language Pathology Progress Note    Patient Name: Gwenette Cogan RELPS'G Date: 1/10/2019     Evaluation attempted, however, pt currently NPO for EGD  Currently refusing blood work and possibly procedure  Discussed role of SLP with pt  Pt reports "I just need to drink something "  Confirms food dysphagia vs liquid dysphagia as well as 30# wt loss in 6 months   "I feel like I could faint  I haven't eaten anything "  Discussed importance of EGD today due to swallowing difficulty  Pt states he can only do it if he has something to drink  Suggested ice chips if OK with MD, which pt was agreeable to  D/w nsg and MD Tracy De Leon to f/u when pt cleared to resume po

## 2019-01-10 NOTE — PROGRESS NOTES
NEPHROLOGY PROGRESS NOTE   Tiana Brown 72 y o  male MRN: [id number]  Unit/Bed#: [facility] 403-01 Encounter: [id number]      ASSESSMENT & PLAN:    51-year-old with a past medical history ischemic cardiomyopathy with an EF 35%, hypertension, hyperlipidemia stage 4 chronic kidney disease that has progressed to end-stage kidney disease here for evaluation of a CABG    1  Acute kidney injury that has progressed end-stage kidney disease  2  Systolic congestive heart failure  3  Hyponatremia  4  Hypertension  5  Anemia of chronic kidney disease  6  CKD bone mineral disease  7  Multi-vessel Coronary artery disease    -having workup for CABG  -given Ativan prior to endoscopy for today  -dialysis yesterday complicated by hypotension yesterday  -ultrafilter 2 L yesterday  -will attempt HD tomorrow again monitor hemodynamics  -ongoing evaluation for CABG  -continue Calcium Acetate 667 mg t i d   With meals  -monitor on Epogen 5000 units with HD    SUBJECTIVE:    Patient given Ativan today with anxiety earlier in the day arousable with sternal rub  Hemodynamics stable    OBJECTIVE:  Current Weight: Weight - Scale: 62 4 kg (137 lb 9 1 oz)  Vitals:    01/10/19 1128   BP: 143/79   Pulse: 88   Resp: 18   Temp: 98 1 °F (36 7 °C)   SpO2: 100%       Intake/Output Summary (Last 24 hours) at 01/10/19 1158  Last data filed at 01/10/19 0000   Gross per 24 hour   Intake          1041 25 ml   Output             2261 ml   Net         -1219 75 ml       General: conscious, cooperative, in not acute distress  Eyes: conjunctivae pink, anicteric sclerae  ENT: lips and mucous membranes moist  Neck: supple, no JVD  Chest: clear breath sounds bilateral, no crackles, ronchus or wheezings  CVS: distinct S1 & S2, normal rate, regular rhythm  Abdomen: soft, non-tender, non-distended, normoactive bowel sounds  Extremities: no edema of both legs  Skin: no rash  Neuro: awake, alert, oriented      Medications:    Current Facility-Administered Medications:    acetaminophen (TYLENOL) tablet 650 mg, 650 mg, Oral, Q6H PRN, Nick Weeks MD    aspirin chewable tablet 81 mg, 81 mg, Oral, Daily, Nick Weeks MD, 81 mg at 01/09/19 1808    atorvastatin (LIPITOR) tablet 40 mg, 40 mg, Oral, Daily With April Ashford MD, 40 mg at 01/09/19 1808    b complex-vitamin C-folic acid (NEPHROCAPS) capsule 1 capsule, 1 capsule, Oral, Daily With April Ashford MD, 1 capsule at 01/09/19 1808    benzonatate (TESSALON PERLES) capsule 100 mg, 100 mg, Oral, TID, Nick Weeks MD, 100 mg at 01/09/19 2207    calcium acetate (PHOSLO) capsule 667 mg, 667 mg, Oral, TID With Meals, Nick Weeks MD, 667 mg at 01/09/19 1808    chlorhexidine (PERIDEX) 0 12 % oral rinse 15 mL, 15 mL, Swish & Spit, Q12H [address], Abdirizak Bolds, PA-C, 15 mL at 01/09/19 2208    epoetin deepa (EPOGEN,PROCRIT) injection 2,000 Units, 2,000 Units, Intravenous, After Dialysis, 2,000 Units at 01/09/19 1419 **AND** epoetin deepa (EPOGEN,PROCRIT) injection 3,000 Units, 3,000 Units, Intravenous, After Dialysis, Nick Weeks MD, 3,000 Units at 01/09/19 1420    furosemide (LASIX) tablet 40 mg, 40 mg, Oral, BID (diuretic), Nick Weeks MD, 40 mg at 01/09/19 1808    insulin glargine (LANTUS) subcutaneous injection 20 Units 0 2 mL, 20 Units, Subcutaneous, HS, Tiffany Benavides MD, 20 Units at 01/09/19 2208    insulin lispro (HumaLOG) 100 units/mL subcutaneous injection 1-5 Units, 1-5 Units, Subcutaneous, HS, Nick Weeks MD, 1 Units at 01/09/19 2208    insulin lispro (HumaLOG) 100 units/mL subcutaneous injection 1-6 Units, 1-6 Units, Subcutaneous, TID AC, 2 Units at 01/10/19 0640 **AND** Fingerstick Glucose (POCT), , , TID AC, Nick Weeks MD    insulin lispro (HumaLOG) 100 units/mL subcutaneous injection 8 Units, 8 Units, Subcutaneous, TID With Meals, Tiffany Benavides MD, 8 Units at 01/09/19 1149    melatonin tablet 3 mg, 3 mg, Oral, , Ricardo Melvin Fontana MD, 3 mg at 01/09/19 2208    metoprolol tartrate (LOPRESSOR) tablet 25 mg, 25 mg, Oral, Q12H [facility], Sandra Herirng MD, 25 mg at 01/09/19 2207    mupirocin (BACTROBAN) 2 % nasal ointment 1 application, 1 application, Nasal, V42V [facility], eNlson Coffey PA-C, 1 application at 60/01/03 2208    ondansetron (ZOFRAN) injection 4 mg, 4 mg, Intravenous, Q6H PRN, Sandra Herring MD    pantoprazole (PROTONIX) EC tablet 40 mg, 40 mg, Oral, Early Morning, Sandra Herring MD, 40 mg at 01/10/19 0502    Invasive Devices:      Lab Results:     Results from last 7 days  Lab Units 01/10/19  0442 01/09/19  1400 01/09/19  0356 01/09/19  0355 01/08/19  1954 01/08/19  0449  01/06/19  0510   WBC Thousand/uL 9 93 10 13 9 61  --  9 64 8 74  < > 7 97   HEMOGLOBIN g/dL 9 7* 9 2* 9 6*  --  10 0* 10 0*  < > 10 0*   HEMATOCRIT % 31 5* 29 1* 30 7*  --  32 6* 32 0*  < > 32 5*   PLATELETS Thousands/uL 118* 138* 125*  --  132* 104*  < > 147*   POTASSIUM mmol/L 4 8 4 4  --  4 5 4 8 5 1  < > 4 3   CHLORIDE mmol/L 94* 87*  --  88* 85* 92*  < > 95*   CO2 mmol/L 30 33*  --  33* 36* 35*  < > 32   BUN mg/dL 27* 40*  --  37* 36* 28*  < > 25   CREATININE mg/dL 3 51* 4 48*  --  4 19* 4 03* 3 48*  < > 3 25*   CALCIUM mg/dL 8 8 9 1  --  9 0 9 0 8 7  < > 8 1*   MAGNESIUM mg/dL 2 3  --   --   --   --   --   --   --    PHOSPHORUS mg/dL 2 2*  --   --   --   --   --   --   --    ALK PHOS U/L  --   --   --   --   --   --   --  61   ALT U/L  --   --   --   --   --   --   --  20   AST U/L  --   --   --   --   --   --   --  14   < > = values in this interval not displayed      Previous work up:  Please see previous notes

## 2019-01-10 NOTE — PROGRESS NOTES
Reviewed blood sugars from past 24 hr   Increase Lantus to 24 units q h s     Patient currently NPO so will hold off on any Humalog a c  Adjustments  Continue scale for correction  Will continue to follow  Monitor for hypoglycemia

## 2019-01-10 NOTE — ANESTHESIA PREPROCEDURE EVALUATION
Review of Systems/Medical History  Patient summary reviewed  Chart reviewed  No history of anesthetic complications     Cardiovascular  EKG reviewed, Pacemaker/AICD, Hyperlipidemia, Hypertension , Past MI 0-3 months, CAD , CAD status: 3VD and obstructive, CHF ,   Comment: Cath: 90% prox LAD and multi vessel disease: consult for cardiac surg  Echo: 35%EF, mild MR  ,  Pulmonary       GI/Hepatic            Endo/Other  Diabetes ,      GYN       Hematology  Anemia ,     Musculoskeletal       Neurology   Psychology           Physical Exam    Airway    Mallampati score: II  TM Distance: >3 FB  Neck ROM: full     Dental   upper dentures and lower dentures,     Cardiovascular      Pulmonary      Other Findings        Anesthesia Plan  ASA Score- 4     Anesthesia Type- IV sedation with anesthesia with ASA Monitors  Additional Monitors:   Airway Plan:         Plan Factors-  Patient did not smoke on day of surgery  Induction-     Postoperative Plan-     Informed Consent- Anesthetic plan and risks discussed with patient and son  I personally reviewed this patient with the CRNA  Discussed and agreed on the Anesthesia Plan with the CRNA  Lary Kyle

## 2019-01-10 NOTE — SOCIAL WORK
Call received from ENRICO at [place] admissions  No discharge date scheduled, when needed call her at [phone] ext [phone]

## 2019-01-10 NOTE — SOCIAL WORK
79yo male transferred from [facility] for CTS evaluation  Dx  NSTEMI, CKD, new HD, a/c CHF, DM   Pt  Is currently in EGD but has been agitated and receiving Ativan  Present in room is pt's wife, who speaks some [place], and their grandson Kaylah Gary, cell [phone]  The primary contact is pt's son Sally Haynes at [phone]  They live in [place] in 2 story home with 8 LOC and bathroom each floor  Several generations of family members reside in 1 house  Pt  Has RW that he uses and no other DME  Wife states had used [facility] once but currently has no HHC  Pt  Does not drive  Wife only drives short distances  And family transports otherwise  He has never had rehab, no hx mental illness or D&A, no POA  He has no PCP and family given [employer] card to obtain one  At this time discharge needs undetermined, CM following and will assist with plans  Family unsure if they would use [facility] again  The plan is to start on regular HD upon discharge and [facility] has been referred  Contact is David Stephens at [facility] admissions, phone [phone] ext [phone]  CM reviewed d/c planning process including the following: identifying help at home, patient preference for d/c planning needs, Discharge Lounge, [facility] Meds to Bed program, availability of treatment team to discuss questions or concerns patient and/or family may have regarding understanding medications and recognizing signs and symptoms once discharged  CM also encouraged patient to follow up with all recommended appointments after discharge  Patient advised of importance for patient and family to participate in managing patients medical well being  Patient/caregiver received discharge checklist  Content reviewed  Patient/caregiver encouraged to participate in discharge plan of care prior to discharge home

## 2019-01-10 NOTE — PROGRESS NOTES
Transition of care to SOD  Patient is 72-year-old male past medical history hypertension, systolic CHF, type 2 diabetes, CKD stage 4 recently started on hemodialysis who originally presented to [facility] with shortness of breath  Patient found to have acute systolic heart failure, a KI on CKD, NSTEMI  Patient started on IV heparin and IV diuresis  Creatinine worsened despite diuresis and patient was ultimately started on hemodialysis 1/4/2019  Cardiac catheterization was performed which showed multi-vessel coronary artery disease  Patient was transferred to [facility] for evaluation for CABG  Patient is a currently being followed by Nephrology, Cardiology, Endocrinology, Cardiac surgery  Cardiac surgeries requesting GI evaluation for dysphagia prior to MARQUISE

## 2019-01-10 NOTE — PROGRESS NOTES
IM Residency Progress Note   Unit/Bed#: [facility] 403-01 Encounter: [id number]  SOD Team A      Maru Stuart 72 y o  male Pr[address] [place] [address] Stay Days: 2      Assessment/Plan:    Principal Problem:    MI, acute, non ST segment elevation ([facility] [address] )  Active Problems:    NATALIIA (acute kidney injury) ([facility] [address] )    Acute on chronic congestive heart failure (HCC)    Type 2 diabetes mellitus with kidney complication, with long-term current use of insulin ([facility])    Dysphagia    Weight loss    Coronary artery disease involving native coronary artery    Dysphagia  Patient complains of dysphagia and associated 30 lb weight loss over the past 6 months  Concern for anatomical obstruction including esophageal stricture related to malignancy versus inflammation  - EGD today    Acute kidney injury  Baseline creatinine unknown  creatinine of 3 5 today  has been undergoing hemodialysis  - became hypotensive during hemodialysis yesterday, plan is to attempt again today    Anxiety  Patient is acutely very anxious about his multiple health issues  His anxiety has been exacerbated by procedures and being NPO  - received 1 mg Ativan-x1  -consent obtained from family for procedures today  - may need to add PRN antianxiety, can try 0 25 ativan PO if able to take PO    Congestive heart failure  Acute on chronic congestive heart failure with an EF of 35 on recent echo  - will undergo MARQUISE today to better assess mitral regurgitation  - consider change to carvedilol from metoprolol tartrate  - Lasix 40 b i d  Coronary artery disease  Patient with multivessel disease, currently undergoing evaluation for CABG  - On heparin drip      Type 2 diabetes  Hemoglobin A1c of 8 2 on presentation  Now on 20 units long-acting, 8 units t i d , sliding scale algorithm 3 for t i d  And 2 for QHS  Disposition:  Continue inpatient, pending CABG clearance      Subjective:   Patient seen and examined  He is very anxious    He is unable to state if he is currently having any symptoms such as fever chills or acute pain  He states that he feels that he is  "about to pass out " He is very upset that he has to be NPO since he says that he has not been able to eat for the past 6 months  Vitals: Temp (24hrs), Av 7 °F (36 5 °C), Min:96 7 °F (35 9 °C), Max:98 4 °F (36 9 °C)  Current: Temperature: 98 4 °F (36 9 °C)  Vitals:    19 2300 01/10/19 0300 01/10/19 0600 01/10/19 0723   BP: 146/74 130/62  136/75   BP Location: Right arm Left arm  Right arm   Pulse: 90 82  88   Resp: 21 20  18   Temp: 98 3 °F (36 8 °C) 98 °F (36 7 °C)  98 4 °F (36 9 °C)   TempSrc: Axillary Oral  Oral   SpO2: 96% 98%  100%   Weight:   62 4 kg (137 lb 9 1 oz)     Body mass index is 22 89 kg/m²  I/O last 24 hours: In: 1221 3 [P O :360; I V :561 3; Other:300]  Out: 3170 [Urine:350; Other:1911]      Physical Exam   Constitutional: He is oriented to person, place, and time  He appears well-developed and well-nourished  No distress  HENT:   Head: Normocephalic and atraumatic  Eyes: Conjunctivae and EOM are normal    R pupil fixed, nonreactive; 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   Cardiovascular: Normal rate and regular rhythm  No murmur heard  S3 present   Pulmonary/Chest: Effort normal and breath sounds normal  He has no wheezes  He has no rales  Abdominal: Soft  Bowel sounds are normal  There is no tenderness  Musculoskeletal: He exhibits no edema or deformity  Neurological: He is alert and oriented to person, place, and time  Skin: Skin is warm and dry     Psychiatric:   Anxious and tearful        Invasive Devices     Peripheral Intravenous Line            Peripheral IV 19 Left Forearm less than 1 day          Hemodialysis Catheter            Permanent HD Catheter  6 days                          Labs:   Recent Results (from the past 24 hour(s))   Fingerstick Glucose (POCT)    Collection Time: 19 10:53 AM   Result Value Ref Range    POC Glucose 273 (H) 65 - 140 mg/dl   APTT    Collection Time: 01/09/19 11:40 AM   Result Value Ref Range    PTT 96 (H) 26 - 38 seconds   Basic metabolic panel    Collection Time: 01/09/19  2:00 PM   Result Value Ref Range    Sodium 127 (L) 136 - 145 mmol/L    Potassium 4 4 3 5 - 5 3 mmol/L    Chloride 87 (L) 100 - 108 mmol/L    CO2 33 (H) 21 - 32 mmol/L    ANION GAP 7 4 - 13 mmol/L    BUN 40 (H) 5 - 25 mg/dL    Creatinine 4 48 (H) 0 60 - 1 30 mg/dL    Glucose 184 (H) 65 - 140 mg/dL    Calcium 9 1 8 3 - 10 1 mg/dL    eGFR 12 ml/min/1 73sq m   CBC and differential    Collection Time: 01/09/19  2:00 PM   Result Value Ref Range    WBC 10 13 4 31 - 10 16 Thousand/uL    RBC 3 53 (L) 3 88 - 5 62 Million/uL    Hemoglobin 9 2 (L) 12 0 - 17 0 g/dL    Hematocrit 29 1 (L) 36 5 - 49 3 %    MCV 82 82 - 98 fL    MCH 26 1 (L) 26 8 - 34 3 pg    MCHC 31 6 31 4 - 37 4 g/dL    RDW 16 5 (H) 11 6 - 15 1 %    MPV 12 2 8 9 - 12 7 fL    Platelets 767 (L) 787 - 390 Thousands/uL    nRBC 0 /100 WBCs    Neutrophils Relative 74 43 - 75 %    Immat GRANS % 0 0 - 2 %    Lymphocytes Relative 17 14 - 44 %    Monocytes Relative 8 4 - 12 %    Eosinophils Relative 1 0 - 6 %    Basophils Relative 0 0 - 1 %    Neutrophils Absolute 7 53 1 85 - 7 62 Thousands/µL    Immature Grans Absolute 0 03 0 00 - 0 20 Thousand/uL    Lymphocytes Absolute 1 68 0 60 - 4 47 Thousands/µL    Monocytes Absolute 0 77 0 17 - 1 22 Thousand/µL    Eosinophils Absolute 0 09 0 00 - 0 61 Thousand/µL    Basophils Absolute 0 03 0 00 - 0 10 Thousands/µL   Fingerstick Glucose (POCT)    Collection Time: 01/09/19  4:18 PM   Result Value Ref Range    POC Glucose 114 65 - 140 mg/dl   APTT    Collection Time: 01/09/19  6:52 PM   Result Value Ref Range    PTT 64 (H) 26 - 38 seconds   Fingerstick Glucose (POCT)    Collection Time: 01/09/19  9:26 PM   Result Value Ref Range    POC Glucose 174 (H) 65 - 140 mg/dl   APTT    Collection Time: 01/10/19  2:32 AM   Result Value Ref Range    PTT 58 (H) 26 - 38 seconds Fingerstick Glucose (POCT)    Collection Time: 01/10/19  3:10 AM   Result Value Ref Range    POC Glucose 205 (H) 65 - 140 mg/dl   Basic metabolic panel    Collection Time: 01/10/19  4:42 AM   Result Value Ref Range    Sodium 127 (L) 136 - 145 mmol/L    Potassium 4 8 3 5 - 5 3 mmol/L    Chloride 94 (L) 100 - 108 mmol/L    CO2 30 21 - 32 mmol/L    ANION GAP 3 (L) 4 - 13 mmol/L    BUN 27 (H) 5 - 25 mg/dL    Creatinine 3 51 (H) 0 60 - 1 30 mg/dL    Glucose 177 (H) 65 - 140 mg/dL    Calcium 8 8 8 3 - 10 1 mg/dL    eGFR 17 ml/min/1 73sq m   CBC and differential    Collection Time: 01/10/19  4:42 AM   Result Value Ref Range    WBC 9 93 4 31 - 10 16 Thousand/uL    RBC 3 75 (L) 3 88 - 5 62 Million/uL    Hemoglobin 9 7 (L) 12 0 - 17 0 g/dL    Hematocrit 31 5 (L) 36 5 - 49 3 %    MCV 84 82 - 98 fL    MCH 25 9 (L) 26 8 - 34 3 pg    MCHC 30 8 (L) 31 4 - 37 4 g/dL    RDW 16 7 (H) 11 6 - 15 1 %    MPV 12 5 8 9 - 12 7 fL    Platelets 035 (L) 545 - 390 Thousands/uL    nRBC 0 /100 WBCs    Neutrophils Relative 72 43 - 75 %    Immat GRANS % 1 0 - 2 %    Lymphocytes Relative 17 14 - 44 %    Monocytes Relative 8 4 - 12 %    Eosinophils Relative 1 0 - 6 %    Basophils Relative 1 0 - 1 %    Neutrophils Absolute 7 24 1 85 - 7 62 Thousands/µL    Immature Grans Absolute 0 07 0 00 - 0 20 Thousand/uL    Lymphocytes Absolute 1 68 0 60 - 4 47 Thousands/µL    Monocytes Absolute 0 82 0 17 - 1 22 Thousand/µL    Eosinophils Absolute 0 06 0 00 - 0 61 Thousand/µL    Basophils Absolute 0 06 0 00 - 0 10 Thousands/µL   Phosphorus    Collection Time: 01/10/19  4:42 AM   Result Value Ref Range    Phosphorus 2 2 (L) 2 3 - 4 1 mg/dL   Magnesium    Collection Time: 01/10/19  4:42 AM   Result Value Ref Range    Magnesium 2 3 1 6 - 2 6 mg/dL   Lipid panel    Collection Time: 01/10/19  4:42 AM   Result Value Ref Range    Cholesterol 119 50 - 200 mg/dL    Triglycerides 65 <=150 mg/dL    HDL, Direct 48 40 - 60 mg/dL    LDL Calculated 58 0 - 100 mg/dL Non-HDL-Chol (CHOL-HDL) 71 mg/dl   Prealbumin    Collection Time: 01/10/19  4:42 AM   Result Value Ref Range    Prealbumin 24 0 18 0 - 40 0 mg/dL   Fingerstick Glucose (POCT)    Collection Time: 01/10/19  5:43 AM   Result Value Ref Range    POC Glucose 216 (H) 65 - 140 mg/dl       Radiology Results: I have personally reviewed pertinent reports  Other Diagnostic Testing:   I have personally reviewed pertinent reports          Active Meds:   Current Facility-Administered Medications   Medication Dose Route Frequency    acetaminophen (TYLENOL) tablet 650 mg  650 mg Oral Q6H PRN    aspirin chewable tablet 81 mg  81 mg Oral Daily    atorvastatin (LIPITOR) tablet 40 mg  40 mg Oral Daily With Dinner    b complex-vitamin C-folic acid (NEPHROCAPS) capsule 1 capsule  1 capsule Oral Daily With Dinner    benzonatate (TESSALON PERLES) capsule 100 mg  100 mg Oral TID    calcium acetate (PHOSLO) capsule 667 mg  667 mg Oral TID With Meals    chlorhexidine (PERIDEX) 0 12 % oral rinse 15 mL  15 mL Swish & Spit Q12H [facility]    epoetin deepa (EPOGEN,PROCRIT) injection 2,000 Units  2,000 Units Intravenous After Dialysis    And    epoetin deepa (EPOGEN,PROCRIT) injection 3,000 Units  3,000 Units Intravenous After Dialysis    furosemide (LASIX) tablet 40 mg  40 mg Oral BID (diuretic)    heparin (porcine) 25,000 units in 250 mL infusion (premix)  3-20 Units/kg/hr (Order-Specific) Intravenous Titrated    heparin (porcine) injection 2,000 Units  2,000 Units Intravenous PRN    heparin (porcine) injection 4,000 Units  4,000 Units Intravenous PRN    insulin glargine (LANTUS) subcutaneous injection 20 Units 0 2 mL  20 Units Subcutaneous HS    insulin lispro (HumaLOG) 100 units/mL subcutaneous injection 1-5 Units  1-5 Units Subcutaneous HS    insulin lispro (HumaLOG) 100 units/mL subcutaneous injection 1-6 Units  1-6 Units Subcutaneous TID AC    insulin lispro (HumaLOG) 100 units/mL subcutaneous injection 8 Units  8 Units Subcutaneous TID With Meals    LORazepam (ATIVAN) 2 mg/mL injection 1 mg  1 mg Intravenous Once    melatonin tablet 3 mg  3 mg Oral HS    metoprolol tartrate (LOPRESSOR) tablet 25 mg  25 mg Oral Q12H [facility]    mupirocin (BACTROBAN) 2 % nasal ointment 1 application  1 application Nasal O59L [facility]    ondansetron (ZOFRAN) injection 4 mg  4 mg Intravenous Q6H PRN    pantoprazole (PROTONIX) EC tablet 40 mg  40 mg Oral Early Morning         VTE Pharmacologic Prophylaxis: Heparin  VTE Mechanical Prophylaxis: sequential compression device    [place] , DO

## 2019-01-10 NOTE — PROGRESS NOTES
Progress Note - Cardiothoracic Surgery   Lidia Dunlap 72 y o  male MRN: [id number]  Unit/Bed#: [facility] 403-01 Encounter: [id number]      24 Hour Events: per nephrology did not tolerate dialysis yesterday was discontinued early  Scheduled for EGD and MARQUISE today       Medications:   Scheduled Meds:  Current Facility-Administered Medications:  acetaminophen 650 mg Oral Q6H PRN Leonardo Rubio MD    aspirin 81 mg Oral Daily Leonardo Rubio MD    atorvastatin 40 mg Oral Daily With Eugene Godfrey MD    b complex-vitamin C-folic acid 1 capsule Oral Daily With Eugene Godfrey MD    benzonatate 100 mg Oral TID Leonardo Rubio MD    calcium acetate 667 mg Oral TID With Meals Leonardo Rubio MD    chlorhexidine 15 mL Swish & Spit Q12H [facility] Ines Wilkins PA-C    epoetin deepa 2,000 Units Intravenous After Dialysis Leonardo Rubio MD    And        epoetin deepa 3,000 Units Intravenous After Dialysis Leonardo Rubio MD    furosemide 40 mg Oral BID (diuretic) Leonardo Rubio MD    heparin (porcine) 3-20 Units/kg/hr (Order-Specific) Intravenous Titrated Leonardo Rubio MD Last Rate: 13 Units/kg/hr (01/10/19 0227)   heparin (porcine) 2,000 Units Intravenous PRN Leonardo Rubio MD    heparin (porcine) 4,000 Units Intravenous PRN Leonardo Rubio MD    insulin glargine 20 Units Subcutaneous HS Bruno Harris MD    insulin lispro 1-5 Units Subcutaneous HS Leonardo Rubio MD    insulin lispro 1-6 Units Subcutaneous TID AC Leonardo Rubio MD    insulin lispro 8 Units Subcutaneous TID With Meals Bruno Harris MD    melatonin 3 mg Oral HS Leonardo uRbio MD    metoprolol tartrate 25 mg Oral Q12H [facility] Leonardo Rubio MD    mupirocin 1 application Nasal [id number] [facility] Alka Carreon PA-C    ondansetron 4 mg Intravenous Q6H PRN Leonardo Rubio MD    pantoprazole 40 mg Oral Early Morning Leonardo Rubio MD      Continuous Infusions:  heparin (porcine) 3-20 Units/kg/hr (Order-Specific) Last Rate: 13 Units/kg/hr (01/10/19 0227)       Results:     Results from last 7 days  Lab Units 01/10/19  0442 01/09/19  1400 01/09/19  0356   WBC Thousand/uL 9 93 10 13 9 61   HEMOGLOBIN g/dL 9 7* 9 2* 9 6*   HEMATOCRIT % 31 5* 29 1* 30 7*   PLATELETS Thousands/uL 118* 138* 125*       Results from last 7 days  Lab Units 01/10/19  0442 01/09/19  1400 01/09/19  0355   POTASSIUM mmol/L 4 8 4 4 4 5   CHLORIDE mmol/L 94* 87* 88*   CO2 mmol/L 30 33* 33*   BUN mg/dL 27* 40* 37*   CREATININE mg/dL 3 51* 4 48* 4 19*   CALCIUM mg/dL 8 8 9 1 9 0       Results from last 7 days  Lab Units 01/10/19  0232 01/09/19  1852 01/09/19  1140  01/06/19  1502   INR   --   --   --   --  1 09   PTT seconds 58* 64* 96*  < > 41*   < > = values in this interval not displayed  Studies:   Cardiac Catheterization:   CORONARY CIRCULATION:  Distal left main: There was a tubular 50 % stenosis  Proximal LAD: There was a tubular 90 % stenosis  2nd diagonal: The vessel was small sized  Angiography showed minor luminal irregularities  Ostial circumflex: The vessel was large sized (dominant)  2nd obtuse marginal: The vessel was small sized  Angiography showed mild atherosclerosis  3rd obtuse marginal: The vessel was small sized  Angiography showed mild atherosclerosis  Left posterior descending artery: There was a 90 % stenosis      Echocardiogram 3D MARQUISE: scheduled today    Carotid artery duplex:   <50 % Left carotid stenosis with antegrade vertebral flow   <50 % Right carotid stenosis with antegrade vertebral flow    Greater saphenous vein mapping: Adequate conduit for CABG,left leg    Vitals:   Vitals:    01/09/19 2300 01/10/19 0300 01/10/19 0600 01/10/19 0723   BP: 146/74 130/62  136/75   BP Location: Right arm Left arm  Right arm   Pulse: 90 82  88   Resp: 21 20  18   Temp: 98 3 °F (36 8 °C) 98 °F (36 7 °C)  98 4 °F (36 9 °C)   TempSrc: Axillary Oral  Oral   SpO2: 96% 98%  100%   Weight:   62 4 kg (137 lb 9 1 oz)        Physical Exam:  HEENT/NECK:  PERRLA  No jugular venous distention  Cardiac: Regular rate and rhythm  Pulmonary:  Breath clear bilaterally  Abdomen:  Non-tender, Non-distended  Positive bowel sounds  Lower extremities: Extremities warm/dry  Neuro: Alert and oriented X 3  Sensation is grossly intact  No focal deficits  Skin: Warm/Dry, without rashes or lesions  Assessment:  Patient Active Problem List   Diagnosis    Shortness of breath    Chest pain    NATALIIA [facility]acute kidney injury) ([facility] )    CKD (chronic kidney disease)    Cardiomyopathy ([place] [id number] )    Hyponatremia    MI, acute, non ST segment elevation ([facility] )    Acute on chronic congestive heart failure ([facility])    Anemia due to chronic kidney disease, on chronic dialysis ([place] [address] )    Type 2 diabetes mellitus with kidney complication, with long-term current use of insulin ([facility])    Dysphagia    Weight loss    Coronary artery disease involving native coronary artery     Severe coronary artery disease; Ongoing CABG workup    Plan:  Undergoing CABG workup  Scheduled for EGD with MARQUISE today  Will follow up results      SIGNATURE: Kristine Penaloza  DATE: January 10, 2019  TIME: 10:27 AM

## 2019-01-10 NOTE — OP NOTE
OPERATIVE REPORT  PATIENT NAME: Gutierrez Herring    :  1945  MRN: [id number]  Pt Location: [facility] ROOM 03    SURGERY DATE: 1/10/2019    Surgeon(s) and Role:     * Evangelista Buck MD - Primary   Frannie Lindsey MD - Fellow   ESOPHAGOGASTRODUODENOSCOPY    PROCEDURE: EGD    SEDATION: Monitored anesthesia care, check anesthesia records    ASA Class: 2    INDICATIONS:  Dysphagia, weight loss and loss of appetite    CONSENT:  Informed consent was obtained for the procedure, including sedation after explaining the risks and benefits of the procedure  Risks including but not limited to bleeding, perforation, infection, and missed lesion  PREPARATION:   Telemetry, pulse oximetry, blood pressure were monitored throughout the procedure  Patient was identified by myself both verbally and by visual inspection of ID band  DESCRIPTION:   Patient was placed in the left lateral decubitus position and was sedated with the above medication  The gastroscope was introduced in to the oropharynx and the esophagus was intubated under direct visualization  Scope was passed down the esophagus up to 2nd part of the duodenum  A careful inspection was made as the gastroscope was withdrawn, including a retroflexed view of the stomach; findings and interventions are described below  FINDINGS:    #1  Esophagus- normal esophagus and GE junction  No stricture or esophagitis noted    #2  Stomach- mild erythema in the body of the stomach otherwise normal    #3  Duodenum- normal bulb and 2nd portion of the duodenum         IMPRESSIONS:      Mild gastritis in the body of the stomach otherwise normal EGD    RECOMMENDATIONS:     Continue Protonix  Can proceed with planned MARQUISE    COMPLICATIONS:  None; patient tolerated the procedure well      SPECIMENS:  * No specimens in log *    ESTIMATED BLOOD LOSS:  Minimal      SIGNATURE: Evangelista Buck MD  DATE: January 10, 2019  TIME: 3:52 PM

## 2019-01-10 NOTE — PHYSICAL THERAPY NOTE
Physical Therapy Evaluation     Patient's Name: Jasson Jurist    Admitting Diagnosis  Chest pain, cardiac [R07 9]    Problem List  Patient Active Problem List   Diagnosis    Shortness of breath    Chest pain    [facility]) ([facility] )    CKD (chronic kidney disease)    Cardiomyopathy ([place] [address] )    Hyponatremia    MI, acute, non ST segment elevation ([facility] )    Acute on chronic congestive heart failure (HCC)    Anemia due to chronic kidney disease, on chronic dialysis ([place] [address] )    Type 2 diabetes mellitus with kidney complication, with long-term current use of insulin ([place] [address] )    Dysphagia    Weight loss    Coronary artery disease involving native coronary artery       Past Medical History  Past Medical History:   Diagnosis Date    CHF (congestive heart failure) ([place] [address] )     Diabetes mellitus ([place] [address] )     Dysphagia     Hyperlipidemia     Hypertension     Myocardial infarction [facility])        Past Surgical History  Past Surgical History:   Procedure Laterality Date    CATARACT EXTRACTION      LEEANN JAIME [facility] PLACEMENT  1/4/2019          01/10/19 1037   Note Type   Note type Eval only  (bed level)   Pain Assessment   Pain Assessment No/denies pain   Home Living   Type of [address] One level  (2 LOC (per pt))   Home Equipment Walker;Cane   Prior Function   Level of [place] (amb w/ SPC (per pt); also used a rw)   Lives With Spouse; Son   James Help From Family   Restrictions/Precautions   Braces or Orthoses (denies)   Other Precautions Cardiac/sternal;Cognitive;Multiple lines;Telemetry;O2;Fall Risk  (bed alarm activated at the end of session)   General   Additional Pertinent History cleared for assessment as lui (spoke to nsg)   Cognition   Overall Cognitive Status Impaired   Arousal/Participation Lethargic   Orientation Level Oriented to person   Memory Decreased recall of recent events;Decreased recall of biographical information   Following Commands Follows one step commands with increased time or repetition   Comments Pt is observed resting in bed; resting; arousable after prolonged stim (pt received Ativan earlier as per RN); remains somnolent and generally disoriented; agreeable to be repositioned and then agreed to assessment;   RUE Assessment   RUE Assessment WFL  (AROM)   RUE Strength   RUE Overall Strength (fair/ fair + (grossly))   LUE Assessment   LUE Assessment WFL  (AROM)   LUE Strength   LUE Overall Strength (fair/ fair + (grossly))   RLE Assessment   RLE Assessment WFL  (AROM)   Strength RLE   RLE Overall Strength (fair + (grossly))   LLE Assessment   LLE Assessment WFL  (AROM)   Strength LLE   LLE Overall Strength (fair + (grossly))   Bed Mobility   Rolling L 3  Moderate assistance   Additional items Assist x 2; Increased time required;Verbal cues;LE management  (repositioned higher in bed w/ (A)x2)   Supine to Sit Unable to assess  (decreased overall level of alertness; )   Transfers   Sit to Stand Unable to assess   Activity Tolerance   Activity Tolerance Patient limited by fatigue; Other (Comment)  (med status; cog status)   Medical Staff Made Aware spoke to Lucia Sanchez PA-C w/ CT sx   Nurse Made Aware spoke to Emil Jacobs RN   Assessment   Prognosis Guarded   Problem List Decreased strength;Decreased endurance;Decreased mobility; Decreased cognition   Assessment Limited bed level assessment initiated; Pt is 72 y o  male transferred from [facility] for CT surgery evaluation for multivessel coronary artery disease; pt presented there with worsening shortness of breath and Dx of MI, acute, non ST segment elevation, NATALIIA, and acute on chronic congestive heart failure; pt is undergoing CABG w/u  Pt 's comorbidities affecting POC include: CHF, DM, HTN and personal factors of: LOC   Pt's clinical presentation is currently unstable/unpredictable which is evident in decreased level of alertness (likely in light of recently received Ativan), NPO status, additional w/u and procedures pending (EGD today), abn lab values being monitored, and ongoing telem monitoring  Pt presents w/ generalized weakness, incl decreased overall LE strength, decreased activity tolerance, inability to progress further w/ mobilization due to overall status  and impaired cognition  Will cont to follow pt in PT for bed level activities at this time w/ progression to OOB mobility trials when clinically appropriate (PT to see the pt next visit); D/C recommendations will follow based on clinical course (will need to re-assess if undergoes OHS) and progress w/ mobility; will follow  Barriers to Discharge Inaccessible home environment   Goals   Patient Goals pt did not express   STG Expiration Date 01/15/19   Short Term Goal #1 3-5 days  Assess supine <--> sit bed mob and OOB transfers/amb as clinically appropriate (PT to see the pt)  Increase ability to perform rolling in bed to (S) level to decrease burden of care  Initiate LE therex to max strength and to facilitate progression w/ mobility skills  (A) w/ D/C planning  Treatment Day 0   Plan   Treatment/Interventions Therapeutic exercise; Endurance training;Cognitive reorientation; Bed mobility;Continued evaluation;Spoke to nursing;Spoke to case management;OT   PT Frequency Other (Comment)  (3-5x/wk)   Recommendation   Recommendation Defer at this time   Modified Daniel Scale   Modified Daniel Scale 5   Barthel Index   Feeding 0  (NPO)   Bathing 0   Grooming Score 0   Dressing Score 0   Bladder Score 10   Bowels Score 10   Toilet Use Score 5   Transfers (Bed/Chair) Score 0   Mobility (Level Surface) Score 0   Stairs Score 0   Barthel Index Score 25       Jeanie Wade, PT

## 2019-01-10 NOTE — PLAN OF CARE
Problem: PHYSICAL THERAPY ADULT  Goal: Performs mobility at highest level of function for planned discharge setting  See evaluation for individualized goals  Treatment/Interventions: Therapeutic exercise, Endurance training, Cognitive reorientation, Bed mobility, Continued evaluation, Spoke to nursing, Spoke to case management, OT          See flowsheet documentation for full assessment, interventions and recommendations  Prognosis: Guarded  Problem List: Decreased strength, Decreased endurance, Decreased mobility, Decreased cognition  Assessment: Limited bed level assessment initiated; Pt is 72 y o  male transferred from [facility] for CT surgery evaluation for multivessel coronary artery disease; pt presented there with worsening shortness of breath and Dx of MI, acute, non ST segment elevation, NATALIIA, and acute on chronic congestive heart failure; pt is undergoing CABG w/u  Pt 's comorbidities affecting POC include: CHF, DM, HTN and personal factors of: LOC  Pt's clinical presentation is currently unstable/unpredictable which is evident in decreased level of alertness (likely in light of recently received Ativan), NPO status, additional w/u and procedures pending (EGD today), abn lab values being monitored, and ongoing telem monitoring  Pt presents w/ generalized weakness, incl decreased overall LE strength, decreased activity tolerance, inability to progress further w/ mobilization due to overall status  and impaired cognition  Will cont to follow pt in PT for bed level activities at this time w/ progression to OOB mobility trials when clinically appropriate (PT to see the pt next visit); D/C recommendations will follow based on clinical course (will need to re-assess if undergoes OHS) and progress w/ mobility; will follow  Barriers to Discharge: Inaccessible home environment     Recommendation: Defer at this time          See flowsheet documentation for full assessment

## 2019-01-11 ENCOUNTER — APPOINTMENT (INPATIENT)
Dept: DIALYSIS | Facility: HOSPITAL | Age: 74
DRG: 280 | End: 2019-01-11
Attending: INTERNAL MEDICINE
Payer: MEDICARE

## 2019-01-11 PROBLEM — I25.10 DISEASE OF CARDIOVASCULAR SYSTEM: Status: ACTIVE | Noted: 2019-01-07

## 2019-01-11 LAB
ANION GAP SERPL CALCULATED.3IONS-SCNC: 6 MMOL/L (ref 4–13)
BASOPHILS # BLD AUTO: 0.03 THOUSANDS/ΜL (ref 0–0.1)
BASOPHILS NFR BLD AUTO: 0 % (ref 0–1)
BUN SERPL-MCNC: 34 MG/DL (ref 5–25)
CALCIUM SERPL-MCNC: 9.2 MG/DL (ref 8.3–10.1)
CHLORIDE SERPL-SCNC: 95 MMOL/L (ref 100–108)
CO2 SERPL-SCNC: 28 MMOL/L (ref 21–32)
CREAT SERPL-MCNC: 4.02 MG/DL (ref 0.6–1.3)
EOSINOPHIL # BLD AUTO: 0.04 THOUSAND/ΜL (ref 0–0.61)
EOSINOPHIL NFR BLD AUTO: 1 % (ref 0–6)
ERYTHROCYTE [DISTWIDTH] IN BLOOD BY AUTOMATED COUNT: 17.1 % (ref 11.6–15.1)
GFR SERPL CREATININE-BSD FRML MDRD: 14 ML/MIN/1.73SQ M
GLUCOSE SERPL-MCNC: 117 MG/DL (ref 65–140)
GLUCOSE SERPL-MCNC: 136 MG/DL (ref 65–140)
GLUCOSE SERPL-MCNC: 175 MG/DL (ref 65–140)
GLUCOSE SERPL-MCNC: 190 MG/DL (ref 65–140)
GLUCOSE SERPL-MCNC: 83 MG/DL (ref 65–140)
HCT VFR BLD AUTO: 31.4 % (ref 36.5–49.3)
HGB BLD-MCNC: 9.7 G/DL (ref 12–17)
IMM GRANULOCYTES # BLD AUTO: 0.04 THOUSAND/UL (ref 0–0.2)
IMM GRANULOCYTES NFR BLD AUTO: 1 % (ref 0–2)
LYMPHOCYTES # BLD AUTO: 1.22 THOUSANDS/ΜL (ref 0.6–4.47)
LYMPHOCYTES NFR BLD AUTO: 15 % (ref 14–44)
MCH RBC QN AUTO: 26.1 PG (ref 26.8–34.3)
MCHC RBC AUTO-ENTMCNC: 30.9 G/DL (ref 31.4–37.4)
MCV RBC AUTO: 85 FL (ref 82–98)
MONOCYTES # BLD AUTO: 0.79 THOUSAND/ΜL (ref 0.17–1.22)
MONOCYTES NFR BLD AUTO: 9 % (ref 4–12)
NEUTROPHILS # BLD AUTO: 6.31 THOUSANDS/ΜL (ref 1.85–7.62)
NEUTS SEG NFR BLD AUTO: 74 % (ref 43–75)
NRBC BLD AUTO-RTO: 0 /100 WBCS
PLATELET # BLD AUTO: 137 THOUSANDS/UL (ref 149–390)
PMV BLD AUTO: 12.4 FL (ref 8.9–12.7)
POTASSIUM SERPL-SCNC: 4.9 MMOL/L (ref 3.5–5.3)
RBC # BLD AUTO: 3.71 MILLION/UL (ref 3.88–5.62)
SODIUM SERPL-SCNC: 129 MMOL/L (ref 136–145)
WBC # BLD AUTO: 8.43 THOUSAND/UL (ref 4.31–10.16)

## 2019-01-11 PROCEDURE — 99232 SBSQ HOSP IP/OBS MODERATE 35: CPT | Performed by: INTERNAL MEDICINE

## 2019-01-11 PROCEDURE — 82948 REAGENT STRIP/BLOOD GLUCOSE: CPT

## 2019-01-11 PROCEDURE — 85025 COMPLETE CBC W/AUTO DIFF WBC: CPT | Performed by: INTERNAL MEDICINE

## 2019-01-11 PROCEDURE — 92610 EVALUATE SWALLOWING FUNCTION: CPT

## 2019-01-11 PROCEDURE — G8997 SWALLOW GOAL STATUS: HCPCS

## 2019-01-11 PROCEDURE — 97530 THERAPEUTIC ACTIVITIES: CPT

## 2019-01-11 PROCEDURE — G8996 SWALLOW CURRENT STATUS: HCPCS

## 2019-01-11 PROCEDURE — 5A1D70Z PERFORMANCE OF URINARY FILTRATION, INTERMITTENT, LESS THAN 6 HOURS PER DAY: ICD-10-PCS | Performed by: INTERNAL MEDICINE

## 2019-01-11 PROCEDURE — 80048 BASIC METABOLIC PNL TOTAL CA: CPT | Performed by: INTERNAL MEDICINE

## 2019-01-11 PROCEDURE — 99232 SBSQ HOSP IP/OBS MODERATE 35: CPT | Performed by: THORACIC SURGERY (CARDIOTHORACIC VASCULAR SURGERY)

## 2019-01-11 RX ORDER — HEPARIN SODIUM 5000 [USP'U]/ML
5000 INJECTION, SOLUTION INTRAVENOUS; SUBCUTANEOUS EVERY 8 HOURS SCHEDULED
Status: DISCONTINUED | OUTPATIENT
Start: 2019-01-11 | End: 2019-01-16 | Stop reason: HOSPADM

## 2019-01-11 RX ORDER — CALCIUM CARBONATE 200(500)MG
500 TABLET,CHEWABLE ORAL DAILY PRN
Status: DISCONTINUED | OUTPATIENT
Start: 2019-01-11 | End: 2019-01-16 | Stop reason: HOSPADM

## 2019-01-11 RX ADMIN — Medication 1 CAPSULE: at 17:35

## 2019-01-11 RX ADMIN — BENZONATATE 100 MG: 100 CAPSULE ORAL at 22:30

## 2019-01-11 RX ADMIN — CHLORHEXIDINE GLUCONATE 0.12% ORAL RINSE 15 ML: 1.2 LIQUID ORAL at 14:06

## 2019-01-11 RX ADMIN — EPOETIN ALFA 3000 UNITS: 3000 SOLUTION INTRAVENOUS; SUBCUTANEOUS at 08:10

## 2019-01-11 RX ADMIN — MELATONIN 3 MG: at 22:30

## 2019-01-11 RX ADMIN — INSULIN LISPRO 1 UNITS: 100 INJECTION, SOLUTION INTRAVENOUS; SUBCUTANEOUS at 17:36

## 2019-01-11 RX ADMIN — HEPARIN SODIUM 5000 UNITS: 5000 INJECTION INTRAVENOUS; SUBCUTANEOUS at 22:31

## 2019-01-11 RX ADMIN — INSULIN GLARGINE 24 UNITS: 100 INJECTION, SOLUTION SUBCUTANEOUS at 22:31

## 2019-01-11 RX ADMIN — ASPIRIN 81 MG 81 MG: 81 TABLET ORAL at 14:06

## 2019-01-11 RX ADMIN — INSULIN LISPRO 10 UNITS: 100 INJECTION, SOLUTION INTRAVENOUS; SUBCUTANEOUS at 17:35

## 2019-01-11 RX ADMIN — INSULIN LISPRO 1 UNITS: 100 INJECTION, SOLUTION INTRAVENOUS; SUBCUTANEOUS at 06:43

## 2019-01-11 RX ADMIN — EPOETIN ALFA 2000 UNITS: 2000 SOLUTION INTRAVENOUS; SUBCUTANEOUS at 08:10

## 2019-01-11 RX ADMIN — CHLORHEXIDINE GLUCONATE 0.12% ORAL RINSE 15 ML: 1.2 LIQUID ORAL at 22:31

## 2019-01-11 RX ADMIN — PANTOPRAZOLE SODIUM 40 MG: 40 TABLET, DELAYED RELEASE ORAL at 06:43

## 2019-01-11 RX ADMIN — ATORVASTATIN CALCIUM 40 MG: 40 TABLET, FILM COATED ORAL at 17:35

## 2019-01-11 RX ADMIN — METOPROLOL TARTRATE 25 MG: 25 TABLET, FILM COATED ORAL at 13:05

## 2019-01-11 RX ADMIN — BENZONATATE 100 MG: 100 CAPSULE ORAL at 17:35

## 2019-01-11 RX ADMIN — FUROSEMIDE 40 MG: 40 TABLET ORAL at 17:35

## 2019-01-11 RX ADMIN — HEPARIN SODIUM 5000 UNITS: 5000 INJECTION INTRAVENOUS; SUBCUTANEOUS at 14:06

## 2019-01-11 RX ADMIN — METOPROLOL TARTRATE 25 MG: 25 TABLET, FILM COATED ORAL at 22:30

## 2019-01-11 RX ADMIN — CALCIUM CARBONATE (ANTACID) CHEW TAB 500 MG 500 MG: 500 CHEW TAB at 00:21

## 2019-01-11 NOTE — MEDICAL STUDENT
MEDICAL STUDENT  Inpatient Progress Note for TRAINING ONLY  Not Part of Legal Medical Record       Progress Note - Misty Neville 72 y o  male MRN: [id number]      Plan:  Blood sugars in last 24 hours have ranged from 216 to 117  Morning glucose after recieving 24 units lantus and 1 unit humalog was 117  1 5 hours later glucose was 175  Increase lantus to 26 units hs  Patient no longer NPO, increase humalog to 10 units TID with meals plus sliding scale  Monitor for hypoglycemia  Vitals: Blood pressure 160/78, pulse 87, temperature 97 5 °F (36 4 °C), temperature source Oral, resp  rate 18, height 5' 5" (1 651 m), weight 62 8 kg (138 lb 7 2 oz), SpO2 100 %  ,Body mass index is 23 04 kg/m²        Intake/Output Summary (Last 24 hours) at 01/11/19 1125  Last data filed at 01/11/19 0800   Gross per 24 hour   Intake             1020 ml   Output              850 ml   Net              170 ml     Component       POC Glucose   Latest Ref Rng & Units       65 - 140 mg/dl   12/31/2018       8:20  (H)   12/31/2018       9:10  (H)   1/8/2019       2:40  (H)   1/8/2019       3:40  (H)   1/8/2019       7:50  (H)   1/8/2019       9:10  (H)   1/9/2019       6:15  (H)   1/9/2019      10:53  (H)   1/9/2019       4:18    1/9/2019       9:26  (H)   1/10/2019       3:10  (H)   1/10/2019       5:43  (H)   1/10/2019      10:43  (H)   1/10/2019       5:03  (H)   1/10/2019       8:43  (H)   1/11/2019       175 (H)

## 2019-01-11 NOTE — HEMODIALYSIS
Patient had full hd tx today, removed 3 kg of fluid, bp stable throughout tx, catheter dressing changed

## 2019-01-11 NOTE — MALNUTRITION/BMI
This medical record reflects one or more clinical indicators suggestive of malnutrition  Malnutrition Findings:   Malnutrition type: Chronic illness (r/t disease/condition, as evidenced by intake meeting <75% of needs for > 1 month, and 27# weight loss (16%) within 6 months  Treatment: liberal diet and nutrition supplements )  Degree of Malnutrition: Other severe protein calorie malnutrition  Malnutrition Characteristics: Inadequate energy, Weight loss       Body mass index is 23 04 kg/m²  See Nutrition note dated 1/11/19 for additional details  Completed nutrition assessment is viewable in the nutrition documentation

## 2019-01-11 NOTE — PLAN OF CARE
CARDIOVASCULAR - ADULT     Maintains optimal cardiac output and hemodynamic stability Progressing     Absence of cardiac dysrhythmias or at baseline rhythm Progressing        DISCHARGE PLANNING     Discharge to home or other facility with appropriate resources Progressing        DISCHARGE PLANNING - CARE MANAGEMENT     Discharge to post-acute care or home with appropriate resources Progressing        GENITOURINARY - ADULT     Maintains or returns to baseline urinary function Progressing     Absence of urinary retention Progressing     Urinary catheter remains patent Progressing        HEMATOLOGIC - ADULT     Maintains hematologic stability Progressing        INFECTION - ADULT     Absence or prevention of progression during hospitalization Progressing        Knowledge Deficit     Patient/family/caregiver demonstrates understanding of disease process, treatment plan, medications, and discharge instructions Progressing        METABOLIC, FLUID AND ELECTROLYTES - ADULT     Electrolytes maintained within normal limits Progressing     Fluid balance maintained Progressing     Glucose maintained within target range Progressing        Nutrition/Hydration-ADULT     Nutrient/Hydration intake appropriate for improving, restoring or maintaining nutritional needs Progressing        PAIN - ADULT     Verbalizes/displays adequate comfort level or baseline comfort level Progressing        Potential for Falls     Patient will remain free of falls Progressing        Prexisting or High Potential for Compromised Skin Integrity     Skin integrity is maintained or improved Progressing        SAFETY ADULT     Maintain or return to baseline ADL function Progressing     Maintain or return mobility status to optimal level Progressing

## 2019-01-11 NOTE — SPEECH THERAPY NOTE
Speech Language/Pathology  Speech/Language Pathology  Assessment    Patient Name: Lidia Dunlap  LCRJX'R Date: 1/11/2019     Problem List  Patient Active Problem List   Diagnosis    Shortness of breath    Chest pain    [facility] )    CKD (chronic kidney disease)    Cardiomyopathy ([facility] )    Hyponatremia    MI, acute, non ST segment elevation ([facility] )    Acute on chronic congestive heart failure (HCC)    Anemia due to chronic kidney disease, on chronic dialysis ([facility] )    Type 2 diabetes mellitus with kidney complication, with long-term current use of insulin (HCC)    Dysphagia    Weight loss    Coronary artery disease involving native coronary artery     Past Medical History  Past Medical History:   Diagnosis Date    [facility]estive heart failure) ([facility])     Diabetes mellitus ([facility] )     Dysphagia     Hyperlipidemia     Hypertension     Myocardial infarction [facility])      Past Surgical History  Past Surgical History:   Procedure Laterality Date    CATARACT EXTRACTION      ESOPHAGOGASTRODUODENOSCOPY N/A 1/10/2019    Procedure: ESOPHAGOGASTRODUODENOSCOPY (EGD); Surgeon: Radha Meyer MD;  Location: [facility]; Service: Gastroenterology    [facility]  1/[facility]     Lidia Dunlap is a 72 y o  male who as a transfer from [facility] for CT surgery evaluation for multivessel coronary artery disease      He presented there due to worsening shortness of breath, lower extremity edema and orthopnea  He said he had recent hospitalization prior to this said [facility] for congestive heart failure  Was discharged on diuretics but his symptoms did not improve  On admission there his creatinine was noted to be significantly elevated with elevated troponin  His symptoms were most likely from congestive heart failure and possible NSTEMI  He was started on IV diuretics  Because of his worsening creatinine despite diuresis, patient was started on hemodialysis 1/14/2019  After this patient underwent cardiac catheterization that showed multivessel coronary artery disease  So it was recommended to transfer the patient here for CT surgery evaluation for CABG      Currently patient denies any complaints  No chest pain or shortness of breath  Says that the swelling in his legs has almost resolved  Bedside Swallow Evaluation:    Summary:  Pt presents w/ mild oral dysphagia characterized by mildly prolonged mastication of soft solid foods  No overt s/s penetration/aspiration noted during session  Pt had eaten level 1 puree lunch c thin liquids prior to session and family reported no issues  Pt reports he has had solid food dysphagia for approx 6 months but unable to report any clear etiology  Pt reports he feels like he has had improvement c swallowing since EGD yesterday  EGD results were as follows:   #1  Esophagus- normal esophagus and GE junction  No stricture or esophagitis noted     #2  Stomach- mild erythema in the body of the stomach otherwise normal     #3  Duodenum- normal bulb and 2nd portion of the duodenum  Recommendations:  Diet: dysphagia level 2  Liquid: thin  Meds: as tolerated  Supervision: assist as needed  Positioning:Upright  Strategies: Pt to take PO/Meds only when fully alert and upright     Oral care: encourage twice daily brushing  Aspiration precautions  Reflux precautions    Therapy Prognosis: good  Prognosis considerations: prior level of function, comorbidities  Frequency: 2-3x/week    Consider consult w/:  Nutrition      Reason for consult:  Determine safest and least restrictive diet  C/o solid food dysphagia    Current diet:  1/thin  Premorbid diet[de-identified]  Liquids only last few months  Previous VBS:  No  O2 requirement:  NC  Voice/Speech:  WNL  Social:  Lives at home c wife  Follows commands:   yes                       Cognitive Status:  intact  Oral [facility] exam:  Edentulous  Upper/lower dentures  Full symmetry    Items administered:  soft solid,  thin liquids  Liquids were taken by straw  Oral stage:  Lip closure: adequate  Mastication: mildly prolonged, ?due to ill fitting denutres  Bolus formation: adequate  Bolus control: adequate  Transfer: prompt  Oral residue: no  Pocketing: no    Pharyngeal stage:  Swallow promptness: yes  Laryngeal rise: adequate  Wet voice: no  Throat clear: no  Cough: no  Secondary swallows: no  Audible swallows: no  No s/s aspiration: no    Esophageal stage:  H/o EGD    Results d/w:  Pt, nursing, family, physician    Goal(s):  Pt will tolerate least restrictive diet w/out s/s aspiration or oral/pharyngeal difficulties

## 2019-01-11 NOTE — PROGRESS NOTES
Progress Note - Cardiothoracic Surgery   Ceferino Mean 72 y o  male MRN: [id number]  Unit/Bed#: [facility] 403-01 Encounter: [id number]      24 Hour Events: Pt receiving HD this morning  EGD completed yesterday   Speech/swallow eval still pending    Medications:   Scheduled Meds:  Current Facility-Administered Medications:  acetaminophen 650 mg Oral Q6H PRN Blossom Stover MD   aspirin 81 mg Oral Daily Blossom Stover MD   atorvastatin 40 mg Oral Daily With Estephanie Rodriguez MD   b complex-vitamin C-folic acid 1 capsule Oral Daily With Estephanie Rodriguez MD   benzonatate 100 mg Oral TID Blossom Stover MD   calcium carbonate 500 mg Oral Daily PRN Edita Forman MD   chlorhexidine 15 mL Swish & Spit Q12H [facility] Eulalia Mayfield PA-C   epoetin deepa 2,000 Units Intravenous After Dialysis Blossom Stover MD   And       epoetin deepa 3,000 Units Intravenous After Dialysis Blossom Stover MD   furosemide 40 mg Oral BID (diuretic) Blossom Stover MD   insulin glargine 24 Units Subcutaneous HS Jose M Mitchell DO   insulin lispro 1-5 Units Subcutaneous HS Blossom Stover MD   insulin lispro 1-6 Units Subcutaneous TID AC Blossom Stover MD   insulin lispro 8 Units Subcutaneous TID With Meals Brandan Palacios MD   melatonin 3 mg Oral HS Blossom Stover MD   metoprolol tartrate 25 mg Oral Q12H [facility] Blossom Stover MD   ondansetron 4 mg Intravenous Q6H PRN Blossom Stover MD   pantoprazole 40 mg Oral Early Morning Blossom Stover MD     Continuous Infusions:     Results:     Results from last 7 days  Lab Units 01/11/19  0455 01/10/19  0442 01/09/19  1400   WBC Thousand/uL 8 43 9 93 10 13   HEMOGLOBIN g/dL 9 7* 9 7* 9 2*   HEMATOCRIT % 31 4* 31 5* 29 1*   PLATELETS Thousands/uL 137* 118* 138*       Results from last 7 days  Lab Units 01/11/19  0455 01/10/19  0442 01/09/19  1400   POTASSIUM mmol/L 4 9 4 8 4 4   CHLORIDE mmol/L 95* 94* 87*   CO2 mmol/L 28 30 33*   BUN mg/dL 34* 27* 40*   CREATININE mg/dL 4 02* 3 51* 4 48*   CALCIUM mg/dL 9 2 8 8 9 1       Results from last 7 days  Lab Units 01/10/19  0232 01/09/19  1852 01/09/19  1140  01/06/19  1502   INR   --   --   --   --  1 09   PTT seconds 58* 64* 96*  < > 41*   < > = values in this interval not displayed  MRSA neg    Studies:   Cardiac Catheterization:   Distal left main: There was a tubular 50 % stenosis  Proximal LAD: There was a tubular 90 % stenosis  2nd diagonal: The vessel was small sized  Angiography showed minor luminal irregularities  Ostial circumflex: The vessel was large sized (dominant)  2nd obtuse marginal: The vessel was small sized  Angiography showed mild atherosclerosis  3rd obtuse marginal: The vessel was small sized  Angiography showed mild atherosclerosis  Left posterior descending artery: There was a 90 % stenosis        Carotid artery duplex:              <50 % Left carotid stenosis with antegrade vertebral flow              <50 % Right carotid stenosis with antegrade vertebral flow     Greater saphenous vein mapping: Adequate conduit for CABG,left leg    EGD: Mild gastritis in the body of the stomach otherwise normal EGD    3D MARQUISE: LEFT VENTRICLE:  Size was normal   Systolic function was markedly reduced  Ejection fraction was estimated to be 30 %  There was severe diffuse hypokinesis with regional variations  No evidence of apical thrombus      RIGHT VENTRICLE:  Systolic function was normal      LEFT ATRIUM:  The atrium was mildly dilated      LEFT ATRIAL APPENDAGE:  No thrombus was identified      ATRIAL SEPTUM:  No defect or patent foramen ovale was identified      MITRAL VALVE:  There was no evidence for stenosis  There was moderate regurgitation  The effective regurgitant orifice area (EROA) by PISA method was 28 mm2, with a regurgitant volume of 39 ml  The regurgitant jet was centrally directed      TRICUSPID VALVE:  There was moderate regurgitation    Estimated peak PA pressure was 50 mmHg  The findings suggest moderate pulmonary hypertension      AORTA:  There was mild atheroma in the proximal descending aorta      PULMONARY VEINS:  There was systolic blunting in the pulmonary vein(s)  Speech/swallow eval: pending    Vitals:   Vitals:    01/11/19 0830 01/11/19 0900 01/11/19 0930 01/11/19 1000   BP: 158/82 (!) 179/93 (!) 184/79 153/61   BP Location:       Pulse: 94 95 89 89   Resp:       Temp:       TempSrc:       SpO2:       Weight:       Height:           Physical Exam:  HEENT/NECK:  PERRLA  No jugular venous distention  Cardiac: Regular rate and rhythm  Pulmonary:  Breath clear bilaterally  Abdomen:  Non-tender, Non-distended  Positive bowel sounds  Lower extremities: Extremities warm/dry  Radial/PT/DP pulses 2+ bilaterally  No edema B/L  Neuro: Alert and oriented X 3  Sensation is grossly intact  No focal deficits  Skin: Warm/Dry, without rashes or lesions  Assessment:  Patient Active Problem List   Diagnosis    Shortness of breath    Chest pain    [facility]ALIIA [facility]) [facility] [facility]    CKD (chronic kidney disease)    Cardiomyopathy [facility]    Hyponatremia    MI, acute, non ST segment elevation ([facility] [address] [facility]    Acute on chronic congestive heart failure (HCC)    Anemia due to chronic kidney disease, on chronic dialysis ([facility] [address] [facility]    Type 2 diabetes mellitus with kidney complication, with long-term current use of insulin (HCC)    Dysphagia    Weight loss    Coronary artery disease involving native coronary artery     Severe coronary artery disease; Ongoing CABG workup    Plan:  Patient agreeable to proceed with surgery; preop workup continues  Speech/swallow eval pending  EGD shows mild gastritis  Recommended for elective colonoscopy in future        SIGNATURE: Mimi Brooke PA-C  DATE: January 11, 2019  TIME: 10:20 AM

## 2019-01-11 NOTE — PROGRESS NOTES
Progress Note - Cardiology   Jennifer Garcia 72 y o  male MRN: [id number]  Unit/Bed#: [facility] 403-01 Encounter: [id number]        Principal Problem:    MI, acute, non ST segment elevation ([facility] 75 )  Active Problems:    NATALIIA (acute kidney injury) ([facility] )    Acute on chronic congestive heart failure ([facility] 75 )    Type 2 diabetes mellitus with kidney complication, with long-term current use of insulin ([facility] 75 )    Dysphagia    Weight loss    Coronary artery disease involving native coronary artery        Patient was transferred from [facility] for CT surgery evaluation for multivessel coronary artery disease   He presented there with shortness of breath, edema and orthopnea   He was initiated on intravenous diuretics  Pt came from [place] to seek medical care  Reports that he lost 20 lbs in 6 months  He is unable to eat because of no hunger  He feels weak, debilitated       Assessment/Plan     1  Multivessel coronary artery disease  Status post cardiac catheterization 1/7-prox left main 90% left PDA 90% distal left main 50%  On aspirin 81, atorvastatin 40, Lopressor 25 b i d  Sent from [address] for CT surgical evaluation  Being worked up ? Candidate     2  NSTEMI -likely type 2 secondary to heart failure in the setting of multivessel coronary artery disease        2  Acute on chronic systolic heart failure  Echocardiogram-EF 35% with severe diffuse hypokinesis   RV mildly dilated   Moderate MR  Mild TR   Estimated peak PA pressure 60 mm Hg  Volume management per Nephrology/HD  Sanket Lighter on Lasix 40 b i d  Hypotensive with HD the other day, NSS given, treatment ended  At HD now, so far BP holding  3  Acute kidney injury/CKD-required hemodialysis  Creatinine on presentation 3 25 which peaked post catheterization 4 21     4  Moderate MR by MARQUISE     5  Diabetes mellitus type 2-hemoglobin A1c to 8 2%     6  Anemia- suspect chronic disease   No obvious signs of bleeding  For outpatient colonoscopy      7   HTN- controlled     8  Debilitated/weakness/poor nutritional status -patient overall reports a decline in his health in the last 6 months  [facility] said he moved from [place] to seek medical attention  Teagan Bundy lived here in the [place] until 2016 then moved to [facility] which was to be permanently  [facility] said he is having difficulty eating the past 6 months because he has no taste and no appetite and dysphagia    He wants quality of life more than he wants longevity  Rec- PT evaluation       8  Dysphagia and weight loss  S/P EGD- mild gastritis  Subjective/Objective   Chief Complaint/Subjective  Pt seen in HD, lethargic  BP tolerating HD so far   Did not awaken enough to answer questions         Vitals: /78   Pulse 87   Temp 97 5 °F (36 4 °C) (Oral)   Resp 18   Ht 5' 5" (1 651 m)   Wt 62 8 kg (138 lb 7 2 oz)   SpO2 100%   BMI 23 04 kg/m²     Vitals:    01/10/19 1212 01/11/19 0600   Weight: 62 1 kg (137 lb) 62 8 kg (138 lb 7 2 oz)     Orthostatic Blood Pressures      Most Recent Value   Blood Pressure  160/78 filed at 01/11/2019 1100   Patient Position - Orthostatic VS  Lying filed at 01/11/2019 0755            Intake/Output Summary (Last 24 hours) at 01/11/19 1120  Last data filed at 01/11/19 0800   Gross per 24 hour   Intake             1020 ml   Output              850 ml   Net              170 ml       Invasive Devices     Peripheral Intravenous Line            Peripheral IV 01/09/19 Left Forearm 1 day          Hemodialysis Catheter            Permanent HD Catheter  7 days                Current Facility-Administered Medications   Medication Dose Route Frequency    acetaminophen (TYLENOL) tablet 650 mg  650 mg Oral Q6H PRN    aspirin chewable tablet 81 mg  81 mg Oral Daily    atorvastatin (LIPITOR) tablet 40 mg  40 mg Oral Daily With Dinner    b complex-vitamin C-folic acid (NEPHROCAPS) capsule 1 capsule  1 capsule Oral Daily With Dinner    benzonatate (TESSALON PERLES) capsule 100 mg  100 mg Oral TID    calcium carbonate (TUMS) chewable tablet 500 mg  500 mg Oral Daily PRN    chlorhexidine (PERIDEX) 0 12 % oral rinse 15 mL  15 mL Swish & Spit Q12H [address]    epoetin deepa (EPOGEN,PROCRIT) injection 2,000 Units  2,000 Units Intravenous After Dialysis    And    epoetin deepa (EPOGEN,PROCRIT) injection 3,000 Units  3,000 Units Intravenous After Dialysis    furosemide (LASIX) tablet 40 mg  40 mg Oral BID (diuretic)    insulin glargine (LANTUS) subcutaneous injection 24 Units 0 24 mL  24 Units Subcutaneous HS    insulin lispro (HumaLOG) 100 units/mL subcutaneous injection 1-5 Units  1-5 Units Subcutaneous HS    insulin lispro (HumaLOG) 100 units/mL subcutaneous injection 1-6 Units  1-6 Units Subcutaneous TID AC    insulin lispro (HumaLOG) 100 units/mL subcutaneous injection 8 Units  8 Units Subcutaneous TID With Meals    melatonin tablet 3 mg  3 mg Oral HS    metoprolol tartrate (LOPRESSOR) tablet 25 mg  25 mg Oral Q12H JUNA C    ondansetron (ZOFRAN) injection 4 mg  4 mg Intravenous Q6H PRN    pantoprazole (PROTONIX) EC tablet 40 mg  40 mg Oral Early Morning         Physical Exam: /78   Pulse 87   Temp 97 5 °F (36 4 °C) (Oral)   Resp 18   Ht 5' 5" (1 651 m)   Wt 62 8 kg (138 lb 7 2 oz)   SpO2 100%   BMI 23 04 kg/m²     General Appearance:    Alert, cooperative, no distress, appears stated age   Head:    Normocephalic, no scleral icterus   Eyes:    PERRL   Nose:   Nares normal, septum midline, no drainage    Throat:   Lips, mucosa, and tongue normal   Neck:   Supple, symmetrical, trachea midline,       no carotid    bruit or JVD   Back:     Symmetric, no CVA tenderness   Lungs:     Clear to auscultation bilaterally, respirations unlabored   Chest Wall:    No tenderness or deformity    Heart:    Regular rate and rhythm, S1 and S2 normal, no murmur, rub   or gallop   Abdomen:     Soft, non-tender, bowel sounds active all four quadrants,     no masses, no organomegaly   Extremities:   Extremities normal, atraumatic, no cyanosis or edema   Pulses:   2+ and symmetric all extremities   Skin:   Skin color, texture, turgor normal, no rashes or lesions   Neurologic:   Alert and oriented to person place and time, no focal deficits                 Lab Results:   Recent Results (from the past 72 hour(s))   APTT    Collection Time: 01/08/19 12:50 PM   Result Value Ref Range    PTT 55 (H) 26 - 38 seconds   Fingerstick Glucose (POCT)    Collection Time: 01/08/19  2:40 PM   Result Value Ref Range    POC Glucose 449 (H) 65 - 140 mg/dl   Fingerstick Glucose (POCT)    Collection Time: 01/08/19  3:40 PM   Result Value Ref Range    POC Glucose 469 (H) 65 - 140 mg/dl   Fingerstick Glucose (POCT)    Collection Time: 01/08/19  7:50 PM   Result Value Ref Range    POC Glucose 381 (H) 65 - 140 mg/dl   APTT    Collection Time: 01/08/19  7:53 PM   Result Value Ref Range    PTT 59 (H) 26 - 38 seconds   Basic metabolic panel    Collection Time: 01/08/19  7:54 PM   Result Value Ref Range    Sodium 125 (L) 136 - 145 mmol/L    Potassium 4 8 3 5 - 5 3 mmol/L    Chloride 85 (L) 100 - 108 mmol/L    CO2 36 (H) 21 - 32 mmol/L    ANION GAP 4 4 - 13 mmol/L    BUN 36 (H) 5 - 25 mg/dL    Creatinine 4 03 (H) 0 60 - 1 30 mg/dL    Glucose 356 (H) 65 - 140 mg/dL    Calcium 9 0 8 3 - 10 1 mg/dL    eGFR 14 ml/min/1 73sq m   CBC    Collection Time: 01/08/19  7:54 PM   Result Value Ref Range    WBC 9 64 4 31 - 10 16 Thousand/uL    RBC 3 90 3 88 - 5 62 Million/uL    Hemoglobin 10 0 (L) 12 0 - 17 0 g/dL    Hematocrit 32 6 (L) 36 5 - 49 3 %    MCV 84 82 - 98 fL    MCH 25 6 (L) 26 8 - 34 3 pg    MCHC 30 7 (L) 31 4 - 37 4 g/dL    RDW 16 4 (H) 11 6 - 15 1 %    Platelets 861 (L) 431 - 390 Thousands/uL    MPV 12 0 8 9 - 12 7 fL   Fingerstick Glucose (POCT)    Collection Time: 01/08/19  9:10 PM   Result Value Ref Range    POC Glucose 397 (H) 65 - 140 mg/dl   APTT    Collection Time: 01/09/19  3:55 AM   Result Value Ref Range    PTT 44 (H) 26 - 38 seconds   Basic metabolic panel    Collection Time: 01/09/19  3:55 AM   Result Value Ref Range    Sodium 127 (L) 136 - 145 mmol/L    Potassium 4 5 3 5 - 5 3 mmol/L    Chloride 88 (L) 100 - 108 mmol/L    CO2 33 (H) 21 - 32 mmol/L    ANION GAP 6 4 - 13 mmol/L    BUN 37 (H) 5 - 25 mg/dL    Creatinine 4 19 (H) 0 60 - 1 30 mg/dL    Glucose 212 (H) 65 - 140 mg/dL    Calcium 9 0 8 3 - 10 1 mg/dL    eGFR 13 ml/min/1 73sq m   Rapid HIV 1/2 AB-AG Combo    Collection Time: 01/09/19  3:55 AM   Result Value Ref Range    Rapid HIV 1 AND 2 Non-Reactive Non-Reactive    HIV-1 P24 Ag Screen Non-Reactive Non-Reactive   CBC and differential    Collection Time: 01/09/19  3:56 AM   Result Value Ref Range    WBC 9 61 4 31 - 10 16 Thousand/uL    RBC 3 68 (L) 3 88 - 5 62 Million/uL    Hemoglobin 9 6 (L) 12 0 - 17 0 g/dL    Hematocrit 30 7 (L) 36 5 - 49 3 %    MCV 83 82 - 98 fL    MCH 26 1 (L) 26 8 - 34 3 pg    MCHC 31 3 (L) 31 4 - 37 4 g/dL    RDW 16 3 (H) 11 6 - 15 1 %    MPV 11 5 8 9 - 12 7 fL    Platelets 046 (L) 406 - 390 Thousands/uL    nRBC 0 /100 WBCs    Neutrophils Relative 74 43 - 75 %    Immat GRANS % 0 0 - 2 %    Lymphocytes Relative 17 14 - 44 %    Monocytes Relative 8 4 - 12 %    Eosinophils Relative 1 0 - 6 %    Basophils Relative 0 0 - 1 %    Neutrophils Absolute 7 07 1 85 - 7 62 Thousands/µL    Immature Grans Absolute 0 03 0 00 - 0 20 Thousand/uL    Lymphocytes Absolute 1 60 0 60 - 4 47 Thousands/µL    Monocytes Absolute 0 79 0 17 - 1 22 Thousand/µL    Eosinophils Absolute 0 08 0 00 - 0 61 Thousand/µL    Basophils Absolute 0 04 0 00 - 0 10 Thousands/µL   Hepatitis B surface antigen    Collection Time: 01/09/19  3:56 AM   Result Value Ref Range    Hepatitis B Surface Ag Non-reactive Non-reactive, NonReactive - Confirmed   Hepatitis C antibody    Collection Time: 01/09/19  3:56 AM   Result Value Ref Range    Hepatitis C Ab Non-reactive Non-reactive   Fingerstick Glucose (POCT)    Collection Time: 01/09/19  6:15 AM   Result Value Ref Range    POC Glucose 246 (H) 65 - 140 mg/dl   Fingerstick Glucose (POCT)    Collection Time: 01/09/19 10:53 AM   Result Value Ref Range    POC Glucose 273 (H) 65 - 140 mg/dl   APTT    Collection Time: 01/09/19 11:40 AM   Result Value Ref Range    PTT 96 (H) 26 - 38 seconds   MRSA culture    Collection Time: 01/09/19  1:08 PM   Result Value Ref Range    MRSA Culture Only       No Methicillin Resistant Staphlyococcus aureus (MRSA) isolated   Basic metabolic panel    Collection Time: 01/09/19  2:00 PM   Result Value Ref Range    Sodium 127 (L) 136 - 145 mmol/L    Potassium 4 4 3 5 - 5 3 mmol/L    Chloride 87 (L) 100 - 108 mmol/L    CO2 33 (H) 21 - 32 mmol/L    ANION GAP 7 4 - 13 mmol/L    BUN 40 (H) 5 - 25 mg/dL    Creatinine 4 48 (H) 0 60 - 1 30 mg/dL    Glucose 184 (H) 65 - 140 mg/dL    Calcium 9 1 8 3 - 10 1 mg/dL    eGFR 12 ml/min/1 73sq m   CBC and differential    Collection Time: 01/09/19  2:00 PM   Result Value Ref Range    WBC 10 13 4 31 - 10 16 Thousand/uL    RBC 3 53 (L) 3 88 - 5 62 Million/uL    Hemoglobin 9 2 (L) 12 0 - 17 0 g/dL    Hematocrit 29 1 (L) 36 5 - 49 3 %    MCV 82 82 - 98 fL    MCH 26 1 (L) 26 8 - 34 3 pg    MCHC 31 6 31 4 - 37 4 g/dL    RDW 16 5 (H) 11 6 - 15 1 %    MPV 12 2 8 9 - 12 7 fL    Platelets 571 (L) 370 - 390 Thousands/uL    nRBC 0 /100 WBCs    Neutrophils Relative 74 43 - 75 %    Immat GRANS % 0 0 - 2 %    Lymphocytes Relative 17 14 - 44 %    Monocytes Relative 8 4 - 12 %    Eosinophils Relative 1 0 - 6 %    Basophils Relative 0 0 - 1 %    Neutrophils Absolute 7 53 1 85 - 7 62 Thousands/µL    Immature Grans Absolute 0 03 0 00 - 0 20 Thousand/uL    Lymphocytes Absolute 1 68 0 60 - 4 47 Thousands/µL    Monocytes Absolute 0 77 0 17 - 1 22 Thousand/µL    Eosinophils Absolute 0 09 0 00 - 0 61 Thousand/µL    Basophils Absolute 0 03 0 00 - 0 10 Thousands/µL   Fingerstick Glucose (POCT)    Collection Time: 01/09/19  4:18 PM   Result Value Ref Range    POC Glucose 114 65 - 140 mg/dl   APTT    Collection Time: 01/09/19  6:52 PM   Result Value Ref Range    PTT 64 (H) 26 - 38 seconds   Fingerstick Glucose (POCT)    Collection Time: 01/09/19  9:26 PM   Result Value Ref Range    POC Glucose 174 (H) 65 - 140 mg/dl   APTT    Collection Time: 01/10/19  2:32 AM   Result Value Ref Range    PTT 58 (H) 26 - 38 seconds   Fingerstick Glucose (POCT)    Collection Time: 01/10/19  3:10 AM   Result Value Ref Range    POC Glucose 205 (H) 65 - 140 mg/dl   Basic metabolic panel    Collection Time: 01/10/19  4:42 AM   Result Value Ref Range    Sodium 127 (L) 136 - 145 mmol/L    Potassium 4 8 3 5 - 5 3 mmol/L    Chloride 94 (L) 100 - 108 mmol/L    CO2 30 21 - 32 mmol/L    ANION GAP 3 (L) 4 - 13 mmol/L    BUN 27 (H) 5 - 25 mg/dL    Creatinine 3 51 (H) 0 60 - 1 30 mg/dL    Glucose 177 (H) 65 - 140 mg/dL    Calcium 8 8 8 3 - 10 1 mg/dL    eGFR 17 ml/min/1 73sq m   CBC and differential    Collection Time: 01/10/19  4:42 AM   Result Value Ref Range    WBC 9 93 4 31 - 10 16 Thousand/uL    RBC 3 75 (L) 3 88 - 5 62 Million/uL    Hemoglobin 9 7 (L) 12 0 - 17 0 g/dL    Hematocrit 31 5 (L) 36 5 - 49 3 %    MCV 84 82 - 98 fL    MCH 25 9 (L) 26 8 - 34 3 pg    MCHC 30 8 (L) 31 4 - 37 4 g/dL    RDW 16 7 (H) 11 6 - 15 1 %    MPV 12 5 8 9 - 12 7 fL    Platelets 162 (L) 512 - 390 Thousands/uL    nRBC 0 /100 WBCs    Neutrophils Relative 72 43 - 75 %    Immat GRANS % 1 0 - 2 %    Lymphocytes Relative 17 14 - 44 %    Monocytes Relative 8 4 - 12 %    Eosinophils Relative 1 0 - 6 %    Basophils Relative 1 0 - 1 %    Neutrophils Absolute 7 24 1 85 - 7 62 Thousands/µL    Immature Grans Absolute 0 07 0 00 - 0 20 Thousand/uL    Lymphocytes Absolute 1 68 0 60 - 4 47 Thousands/µL    Monocytes Absolute 0 82 0 17 - 1 22 Thousand/µL    Eosinophils Absolute 0 06 0 00 - 0 61 Thousand/µL    Basophils Absolute 0 06 0 00 - 0 10 Thousands/µL   Phosphorus    Collection Time: 01/10/19  4:42 AM   Result Value Ref Range    Phosphorus 2 2 (L) 2 3 - 4 1 mg/dL   Magnesium Collection Time: 01/10/19  4:42 AM   Result Value Ref Range    Magnesium 2 3 1 6 - 2 6 mg/dL   Lipid panel    Collection Time: 01/10/19  4:42 AM   Result Value Ref Range    Cholesterol 119 50 - 200 mg/dL    Triglycerides 65 <=150 mg/dL    HDL, Direct 48 40 - 60 mg/dL    LDL Calculated 58 0 - 100 mg/dL    Non-HDL-Chol (CHOL-HDL) 71 mg/dl   Prealbumin    Collection Time: 01/10/19  4:42 AM   Result Value Ref Range    Prealbumin 24 0 18 0 - 40 0 mg/dL   Fingerstick Glucose (POCT)    Collection Time: 01/10/19  5:43 AM   Result Value Ref Range    POC Glucose 216 (H) 65 - 140 mg/dl   Fingerstick Glucose (POCT)    Collection Time: 01/10/19 10:43 AM   Result Value Ref Range    POC Glucose 184 (H) 65 - 140 mg/dl   Fingerstick Glucose (POCT)    Collection Time: 01/10/19  5:03 PM   Result Value Ref Range    POC Glucose 192 (H) 65 - 140 mg/dl   Fingerstick Glucose (POCT)    Collection Time: 01/10/19  8:43 PM   Result Value Ref Range    POC Glucose 182 (H) 65 - 140 mg/dl   Basic metabolic panel    Collection Time: 01/11/19  4:55 AM   Result Value Ref Range    Sodium 129 (L) 136 - 145 mmol/L    Potassium 4 9 3 5 - 5 3 mmol/L    Chloride 95 (L) 100 - 108 mmol/L    CO2 28 21 - 32 mmol/L    ANION GAP 6 4 - 13 mmol/L    BUN 34 (H) 5 - 25 mg/dL    Creatinine 4 02 (H) 0 60 - 1 30 mg/dL    Glucose 117 65 - 140 mg/dL    Calcium 9 2 8 3 - 10 1 mg/dL    eGFR 14 ml/min/1 73sq m   CBC and differential    Collection Time: 01/11/19  4:55 AM   Result Value Ref Range    WBC 8 43 4 31 - 10 16 Thousand/uL    RBC 3 71 (L) 3 88 - 5 62 Million/uL    Hemoglobin 9 7 (L) 12 0 - 17 0 g/dL    Hematocrit 31 4 (L) 36 5 - 49 3 %    MCV 85 82 - 98 fL    MCH 26 1 (L) 26 8 - 34 3 pg    MCHC 30 9 (L) 31 4 - 37 4 g/dL    RDW 17 1 (H) 11 6 - 15 1 %    MPV 12 4 8 9 - 12 7 fL    Platelets 327 (L) 959 - 390 Thousands/uL    nRBC 0 /100 WBCs    Neutrophils Relative 74 43 - 75 %    Immat GRANS % 1 0 - 2 %    Lymphocytes Relative 15 14 - 44 %    Monocytes Relative 9 4 - 12 %    Eosinophils Relative 1 0 - 6 %    Basophils Relative 0 0 - 1 %    Neutrophils Absolute 6 31 1 85 - 7 62 Thousands/µL    Immature Grans Absolute 0 04 0 00 - 0 20 Thousand/uL    Lymphocytes Absolute 1 22 0 60 - 4 47 Thousands/µL    Monocytes Absolute 0 79 0 17 - 1 22 Thousand/µL    Eosinophils Absolute 0 04 0 00 - 0 61 Thousand/µL    Basophils Absolute 0 03 0 00 - 0 10 Thousands/µL   Fingerstick Glucose (POCT)    Collection Time: 19  6:25 AM   Result Value Ref Range    POC Glucose 175 (H) 65 - 140 mg/dl     St  [address]  [facility], [address] [facility]  [phone]     Transesophageal Echocardiogram  2D, Doppler, and Color Doppler     Study date:  10-Gian-2019     Patient: Unique Riley  MR number: [id number]  Account number: [de-identified]  : 1945  Age: 72 years  Gender: Male  Status: Inpatient  Location: Echo lab  Height: 65 in  Weight: 138 lb  BP: 143/ 76 mmHg     Indications: Mitral valve disease      Diagnoses: I34 9 - Nonrheumatic mitral valve disorder, unspecified     Sonographer:  Lian Puente RDCS  Interpreting Physician:  Monica Dee MD  Referring Physician:  Diallo Sandoval PA-C  Group:  [facility]  Cardiology Fellow:  Florencio Hillman MD     SUMMARY     LEFT VENTRICLE:  Size was normal   Systolic function was markedly reduced  Ejection fraction was estimated to be 30 %  There was severe diffuse hypokinesis with regional variations  No evidence of apical thrombus      RIGHT VENTRICLE:  Systolic function was normal      LEFT ATRIUM:  The atrium was mildly dilated      LEFT ATRIAL APPENDAGE:  No thrombus was identified      ATRIAL SEPTUM:  No defect or patent foramen ovale was identified      MITRAL VALVE:  There was no evidence for stenosis  There was moderate regurgitation  The effective regurgitant orifice area (EROA) by PISA method was 28 mm2, with a regurgitant volume of 39 ml    The regurgitant jet was centrally directed      TRICUSPID VALVE:  There was moderate regurgitation  Estimated peak PA pressure was 50 mmHg  The findings suggest moderate pulmonary hypertension      AORTA:  There was mild atheroma in the proximal descending aorta      PULMONARY VEINS:  There was systolic blunting in the pulmonary vein(s)      HISTORY: PRIOR HISTORY: Myocardial infarction, Congestive heart failure, Cardiomyopathy, Chronic kidney disease, Hypertension, Diabetes, Hyperlipidemia      PROCEDURE: The procedure was performed in the GI lab  This was a routine study  The risks and alternatives of the procedure were explained to the patient and informed consent was obtained  The transesophageal approach was used  The study  included complete 2D imaging, complete spectral Doppler, and color Doppler  The heart rate was 80 bpm, at the start of the study  An adult omniplane probe was inserted by the cardiology fellow under direct supervision of the attending  cardiologist  Intubated with ease  One intubation attempt(s)  There was no blood detected on the probe  Image quality was adequate  There were no complications during the procedure  MEDICATIONS: Anesthesia administered by anesthesia team      LEFT VENTRICLE: Size was normal  Systolic function was markedly reduced  Ejection fraction was estimated to be 30 %  There was severe diffuse hypokinesis with regional variations  No evidence of apical thrombus  DOPPLER: Left ventricular  diastolic function parameters were abnormal      RIGHT VENTRICLE: The size was normal  Systolic function was normal  Wall thickness was normal      LEFT ATRIUM: The atrium was mildly dilated  No thrombus was identified  APPENDAGE: The size was normal  No thrombus was identified   DOPPLER: The function was mildly reduced (mildly reduced emptying velocity)      ATRIAL SEPTUM: No defect or patent foramen ovale was identified      RIGHT ATRIUM: Size was normal  No thrombus was identified      MITRAL VALVE: Valve structure was normal  There was normal leaflet separation  DOPPLER: The transmitral velocity was within the normal range  There was no evidence for stenosis  There was moderate regurgitation  The effective regurgitant  orifice area (EROA) by PISA method was 28 mm2, with a regurgitant volume of 39 ml  The regurgitant jet was centrally directed      AORTIC VALVE: The valve was trileaflet  Leaflets exhibited mildly increased thickness and normal cuspal separation  DOPPLER: There was no evidence for stenosis  There was no significant regurgitation      TRICUSPID VALVE: The valve structure was normal  There was normal leaflet separation  DOPPLER: There was moderate regurgitation  Pulmonary artery systolic pressure was moderately increased  Estimated peak PA pressure was 50 mmHg  The  findings suggest moderate pulmonary hypertension      PULMONIC VALVE: Leaflets exhibited normal thickness, no calcification, and normal cuspal separation  DOPPLER: There was no significant regurgitation      PERICARDIUM: There was no pericardial effusion      AORTA: The root exhibited normal size  There was mild atheroma in the proximal descending aorta      PULMONARY VEINS: DOPPLER: There was systolic blunting in the pulmonary vein(s)     [facility] Accredited [facility]ch[facility]     Prepared and electronically signed by  Alejandra Cardenas MD  Signed 10-Gian-2019 16:56:48    Imaging: I have personally reviewed pertinent reports  Counseling / Coordination of Care  Total time spent today 25 minutes  Greater than 50% of total time was spent with the patient and / or family counseling and / or coordination of care

## 2019-01-11 NOTE — PROGRESS NOTES
Progress Note Ruby Maher 72 y o  male MRN: [id number]    Unit/Bed#: [facility] 403-01 Encounter: [id number]      CC: diabetes f/u    Subjective: Jennifer Garcia is a 72y o  year old male with type 2 diabetes  Feels well  No complaints  No hypoglycemia  Objective:     Vitals: Blood pressure 138/65, pulse 91, temperature 97 8 °F (36 6 °C), temperature source Oral, resp  rate 16, height 5' 5" (1 651 m), weight 62 8 kg (138 lb 7 2 oz), SpO2 95 %  ,Body mass index is 23 04 kg/m²  Intake/Output Summary (Last 24 hours) at 01/11/19 1536  Last data filed at 01/11/19 1327   Gross per 24 hour   Intake             1560 ml   Output             3450 ml   Net            -1890 ml       Physical Exam:  General: No acute distress  Alert, awake  HEENT: Normocephalic, atraumatic  Heart: Regular rate and rhythm  Lungs: Clear  No respiratory distress  Extremities: No edema  Skin: Warm, dry  No rash  Neuro: Moves all 4 extremities spontaneously  Psych: Appropriate mood and affect  Cooperative  Lab, Imaging and other studies: I have personally reviewed pertinent reports  POC Glucose (mg/dl)   Date Value   01/11/2019 83   01/11/2019 175 (H)   01/10/2019 182 (H)   01/10/2019 192 (H)   01/10/2019 184 (H)   01/10/2019 216 (H)   01/10/2019 205 (H)   01/09/2019 174 (H)   01/09/2019 114   01/09/2019 273 (H)       Assessment/Plan:  DM 2 with hyperglycemia and long term insulin use: Continue lantus 24 units qhs  Increase Humalog just with dinner to 10 units  Continue humalog 8 units with breakfast and lunch  Continue to follow and adjust regimen as needed  Monitor for hypoglycemia  CAD: Work up for possible CABG underway  Portions of the record may have been created with voice recognition software  Occasional wrong word or "sound a like" substitutions may have occurred due to the inherent limitations of voice recognition software   Read the chart carefully and recognize, using context, where substitutions have occurred

## 2019-01-11 NOTE — PROGRESS NOTES
IM Residency Progress Note   Unit/Bed#: [facility] 403-01 Encounter: [id number]  SOD Team A      Demi Arambula 72 y o  male Pr-[address] [place] [address] [place] Stay Days: 3      Assessment/Plan:    Principal Problem:    MI, acute, non ST segment elevation ([facility] [address] )  Active Problems:    NATALIIA (acute kidney injury) ([facility] [address] )    Acute on chronic congestive heart failure (HCC)    Type 2 diabetes mellitus with kidney complication, with long-term current use of insulin ([facility])    Dysphagia    Weight loss    Coronary artery disease involving native coronary artery    Dysphagia  Patient complains of dysphagia and associated 30 lb weight loss over the past 6 months      - EGD demonstrated no esophagitis or anatomic obstruction  - appreciate speech therapy recommendations  - dysphagia diet     Acute kidney injury  Baseline creatinine unknown  creatinine of 3 5 today  has been undergoing hemodialysis  - hemodialysis Monday Wednesday Friday     Anxiety  Patient is acutely very anxious about his multiple health issues  His anxiety has been exacerbated by procedures and being NPO  - received 1 mg Ativan-x1 yesterday  -consent obtained from family for procedures   - may need to add PRN antianxiety, can try 0 25 ativan PO   - significantly better this morning     Congestive heart failure  Acute on chronic congestive heart failure with an EF of 35 on recent echo  - MARQUISE demonstrates EF of 30%, diffuse hypokinesis, moderate mitral regurgitation  - consider change to carvedilol from metoprolol tartrate  - Lasix 40 b i d      Coronary artery disease  Patient with multivessel disease, currently undergoing evaluation for CABG  - On heparin drip  - will need PT evaluation     Type 2 diabetes  Hemoglobin A1c of 8 2 on presentation  Appreciate endocrinology recommendations - now on 24 units long-acting, 8 units t i d , sliding scale algorithm 3 for t i d  And 2 for QHS    Disposition:  Continue inpatient      Subjective:   Patient seen and examined    He reports that he is feeling much better this morning  He is significantly less anxious  He is very relieved that he is able to eat and he says he has no issues swallowing with his soft diet  He denies fever or chills  Vitals: Temp (24hrs), Av 6 °F (36 4 °C), Min:96 °F (35 6 °C), Max:98 2 °F (36 8 °C)  Current: Temperature: 97 5 °F (36 4 °C)  Vitals:    01/10/19 2340 19 0312 19 0600 19 0712   BP: 133/75 127/59  148/82   BP Location: Right arm   Left arm   Pulse: 78 88  97   Resp: 18 20  18   Temp: 98 2 °F (36 8 °C) 97 8 °F (36 6 °C)  97 5 °F (36 4 °C)   TempSrc: Oral Oral  Oral   SpO2: 100% 96%  100%   Weight:   62 8 kg (138 lb 7 2 oz)    Height:        Body mass index is 23 04 kg/m²  I/O last 24 hours: In: 36 [I V :599]  Out: 850 [Urine:850]      Physical Exam   Constitutional: He appears well-developed and well-nourished  No distress  HENT:   Head: Normocephalic and atraumatic  Eyes: Conjunctivae and EOM are normal    Cardiovascular: Normal rate, regular rhythm, normal heart sounds and intact distal pulses  No murmur heard  Pulmonary/Chest: Effort normal and breath sounds normal  He has no wheezes  He has no rales  Abdominal: Soft  Bowel sounds are normal  There is no tenderness  Musculoskeletal: He exhibits no edema or deformity  Neurological: He is alert  Examined in dialysis - somnolent and tremulous; earlier had been alert and conversant   Skin: Skin is warm and dry  No pallor  Psychiatric: He has a normal mood and affect   His behavior is normal        Invasive Devices     Peripheral Intravenous Line            Peripheral IV 19 Left Forearm 1 day          Hemodialysis Catheter            Permanent HD Catheter  6 days                          Labs:   Recent Results (from the past 24 hour(s))   Fingerstick Glucose (POCT)    Collection Time: 01/10/19 10:43 AM   Result Value Ref Range    POC Glucose 184 (H) 65 - 140 mg/dl   Fingerstick Glucose (POCT)    Collection Time: 01/10/19  5:03 PM   Result Value Ref Range    POC Glucose 192 (H) 65 - 140 mg/dl   Fingerstick Glucose (POCT)    Collection Time: 01/10/19  8:43 PM   Result Value Ref Range    POC Glucose 182 (H) 65 - 140 mg/dl   Basic metabolic panel    Collection Time: 01/11/19  4:55 AM   Result Value Ref Range    Sodium 129 (L) 136 - 145 mmol/L    Potassium 4 9 3 5 - 5 3 mmol/L    Chloride 95 (L) 100 - 108 mmol/L    CO2 28 21 - 32 mmol/L    ANION GAP 6 4 - 13 mmol/L    BUN 34 (H) 5 - 25 mg/dL    Creatinine 4 02 (H) 0 60 - 1 30 mg/dL    Glucose 117 65 - 140 mg/dL    Calcium 9 2 8 3 - 10 1 mg/dL    eGFR 14 ml/min/1 73sq m   CBC and differential    Collection Time: 01/11/19  4:55 AM   Result Value Ref Range    WBC 8 43 4 31 - 10 16 Thousand/uL    RBC 3 71 (L) 3 88 - 5 62 Million/uL    Hemoglobin 9 7 (L) 12 0 - 17 0 g/dL    Hematocrit 31 4 (L) 36 5 - 49 3 %    MCV 85 82 - 98 fL    MCH 26 1 (L) 26 8 - 34 3 pg    MCHC 30 9 (L) 31 4 - 37 4 g/dL    RDW 17 1 (H) 11 6 - 15 1 %    MPV 12 4 8 9 - 12 7 fL    Platelets 906 (L) 522 - 390 Thousands/uL    nRBC 0 /100 WBCs    Neutrophils Relative 74 43 - 75 %    Immat GRANS % 1 0 - 2 %    Lymphocytes Relative 15 14 - 44 %    Monocytes Relative 9 4 - 12 %    Eosinophils Relative 1 0 - 6 %    Basophils Relative 0 0 - 1 %    Neutrophils Absolute 6 31 1 85 - 7 62 Thousands/µL    Immature Grans Absolute 0 04 0 00 - 0 20 Thousand/uL    Lymphocytes Absolute 1 22 0 60 - 4 47 Thousands/µL    Monocytes Absolute 0 79 0 17 - 1 22 Thousand/µL    Eosinophils Absolute 0 04 0 00 - 0 61 Thousand/µL    Basophils Absolute 0 03 0 00 - 0 10 Thousands/µL   Fingerstick Glucose (POCT)    Collection Time: 01/11/19  6:25 AM   Result Value Ref Range    POC Glucose 175 (H) 65 - 140 mg/dl       Radiology Results: I have personally reviewed pertinent reports  Other Diagnostic Testing:   I have personally reviewed pertinent reports          Active Meds:   Current Facility-Administered Medications   Medication Dose Route Frequency    acetaminophen (TYLENOL) tablet 650 mg  650 mg Oral Q6H PRN    aspirin chewable tablet 81 mg  81 mg Oral Daily    atorvastatin (LIPITOR) tablet 40 mg  40 mg Oral Daily With Dinner    b complex-vitamin C-folic acid (NEPHROCAPS) capsule 1 capsule  1 capsule Oral Daily With Dinner    benzonatate (TESSALON PERLES) capsule 100 mg  100 mg Oral TID    calcium acetate (PHOSLO) capsule 667 mg  667 mg Oral TID With Meals    calcium carbonate (TUMS) chewable tablet 500 mg  500 mg Oral Daily PRN    chlorhexidine (PERIDEX) 0 12 % oral rinse 15 mL  15 mL Swish & Spit Q12H [facility]    epoetin deepa (EPOGEN,PROCRIT) injection 2,000 Units  2,000 Units Intravenous After Dialysis    And    epoetin deepa (EPOGEN,PROCRIT) injection 3,000 Units  3,000 Units Intravenous After Dialysis    furosemide (LASIX) tablet 40 mg  40 mg Oral BID (diuretic)    insulin glargine (LANTUS) subcutaneous injection 24 Units 0 24 mL  24 Units Subcutaneous HS    insulin lispro (HumaLOG) 100 units/mL subcutaneous injection 1-5 Units  1-5 Units Subcutaneous HS    insulin lispro (HumaLOG) 100 units/mL subcutaneous injection 1-6 Units  1-6 Units Subcutaneous TID AC    insulin lispro (HumaLOG) 100 units/mL subcutaneous injection 8 Units  8 Units Subcutaneous TID With Meals    melatonin tablet 3 mg  3 mg Oral HS    metoprolol tartrate (LOPRESSOR) tablet 25 mg  25 mg Oral Q12H JUAN C    ondansetron (ZOFRAN) injection 4 mg  4 mg Intravenous Q6H PRN    pantoprazole (PROTONIX) EC tablet 40 mg  40 mg Oral Early Morning         VTE Pharmacologic Prophylaxis: Heparin  VTE Mechanical Prophylaxis: sequential compression device    Elsa Velasquezer,

## 2019-01-11 NOTE — PLAN OF CARE
Problem: PHYSICAL THERAPY ADULT  Goal: Performs mobility at highest level of function for planned discharge setting  See evaluation for individualized goals  Treatment/Interventions: Therapeutic exercise, Endurance training, gait training, transfer training, elevations, Cognitive reorientation, Bed mobility, Spoke to nursing, Spoke to [place];       See flowsheet documentation for full assessment, interventions and recommendations  Outcome: Progressing  Prognosis: Guarded  Problem List: Decreased strength, Decreased endurance, Impaired balance, Decreased mobility, Decreased cognition  Assessment: Functional mobility assessment incl OOB mobilization trial completed; pt's premorbid functional mobility status and home set up was also clarified as outlined above; as per family, pt was able to navigate/ambulate household distances w/ rw and required (A) for steps and mobilization outside of the house including car transfers; currently, pt appears to be generally weak and deconditioned (? in light of clinical course and relative immobility) requiring min/mod (A) w/ all aspects of observed mobility on level surfaces including amb w/ rw; overall functional status therefore appears to be below premorbid level --> will cont to follow to address; also, at this time recommend rehab upon D/C when medically cleared to max functional (I), endurance, and safety to decrease burden of care at home; will otherwise cont to follow until then per updated Tx goals below  Barriers to Discharge: Inaccessible home environment     Recommendation: Short-term skilled PT          See flowsheet documentation for full assessment

## 2019-01-11 NOTE — PROGRESS NOTES
NEPHROLOGY PROGRESS NOTE   Emily Villasenor 72 y o  male MRN: [id number]  Unit/Bed#: [facility] 403-01 Encounter: [id number]      ASSESSMENT & PLAN:    53-year-old with a past medical history ischemic cardiomyopathy with an EF 35%, hypertension, hyperlipidemia stage 4 chronic kidney disease that has progressed to end-stage kidney disease here for evaluation of a CABG    1  Acute kidney injury that has progressed end-stage kidney disease  2  Systolic congestive heart failure  3  Hyponatremia  4  Hypertension  5  Anemia of chronic kidney disease  6  CKD bone mineral disease  7  Multi-vessel Coronary artery disease    -having workup for CABG  -seen on dialysis today at 8:55 a m  And tolerating is treat ultrafiltration about 2 5 L  -still reluctant on completing dialysis 3 times weekly  -over the weekend will monitor is making 800 cc of urine will do a 24 hr urine collection on Sunday  -ongoing evaluation for CABG  - Calcium Acetate 667 mg t i d   With meals-phosphorus now low will discontinue  -continue Epogen 5000 units with HD    SUBJECTIVE:    Patient seen on hemodialysis today blood pressures are now elevated tolerating 2 5 L removal weight did increase    OBJECTIVE:  Current Weight: Weight - Scale: 62 8 kg (138 lb 7 2 oz)  Vitals:    01/11/19 0930   BP: (!) 184/79   Pulse: 89   Resp:    Temp:    SpO2:        Intake/Output Summary (Last 24 hours) at 01/11/19 2236  Last data filed at 01/11/19 0800   Gross per 24 hour   Intake          1218 95 ml   Output              850 ml   Net           368 95 ml       General: conscious, cooperative, in not acute distress  Eyes: conjunctivae pink, anicteric sclerae  ENT: lips and mucous membranes moist  Neck: supple, no JVD  Chest: clear breath sounds bilateral, no crackles, ronchus or wheezings, tunneled dialysis catheter  CVS: distinct S1 & S2, normal rate, regular rhythm  Abdomen: soft, non-tender, non-distended, normoactive bowel sounds  Extremities:  Extremities warm no edema  Skin: no rash  Neuro: awake, alert, oriented    Medications:    Current Facility-Administered Medications:     acetaminophen (TYLENOL) tablet 650 mg, 650 mg, Oral, Q6H PRN, Jason Silverio MD    aspirin chewable tablet 81 mg, 81 mg, Oral, Daily, Jason Silverio MD, 81 mg at 01/09/19 1808    atorvastatin (LIPITOR) tablet 40 mg, 40 mg, Oral, Daily With Eric Grossman MD, 40 mg at 01/10/19 1726    b complex-vitamin C-folic acid (NEPHROCAPS) capsule 1 capsule, 1 capsule, Oral, Daily With Eric Grossman MD, 1 capsule at 01/10/19 1727    benzonatate (TESSALON PERLES) capsule 100 mg, 100 mg, Oral, TID, Jason Silverio MD, 100 mg at 01/10/19 2109    calcium acetate (PHOSLO) capsule 667 mg, 667 mg, Oral, TID With Meals, Jason Silverio MD, 667 mg at 01/10/19 1726    calcium carbonate (TUMS) chewable tablet 500 mg, 500 mg, Oral, Daily PRN, Edita Forman MD, 500 mg at 01/11/19 0021    chlorhexidine (PERIDEX) 0 12 % oral rinse 15 mL, 15 mL, Swish & Spit, Q12H [facility], 15 mL at 01/10/19 2109    epoetin deepa (EPOGEN,PROCRIT) injection 2,000 Units, 2,000 Units, Intravenous, After Dialysis, 2,000 Units at 01/11/19 0810 **AND** epoetin deepa (EPOGEN,PROCRIT) injection 3,000 Units, 3,000 Units, Intravenous, After Dialysis, Jason Silverio MD, 3,000 Units at 01/11/19 0810    furosemide (LASIX) tablet 40 mg, 40 mg, Oral, BID (diuretic), Jason Silverio MD, 40 mg at 01/10/19 1726    insulin glargine (LANTUS) subcutaneous injection 24 Units 0 24 mL, 24 Units, Subcutaneous, HS, Henrietta Storey DO, 24 Units at 01/10/19 2110    insulin lispro (HumaLOG) 100 units/mL subcutaneous injection 1-5 Units, 1-5 Units, Subcutaneous, HS, Jason Silverio MD, 1 Units at 01/10/19 2110    insulin lispro (HumaLOG) 100 units/mL subcutaneous injection 1-6 Units, 1-6 Units, Subcutaneous, TID AC, 1 Units at 01/11/19 0643 **AND** Fingerstick Glucose (POCT), , , TID BEENA, Ricardo HSU Jacobo Goldberg, MD    insulin lispro (HumaLOG) 100 units/mL subcutaneous injection 8 Units, 8 Units, Subcutaneous, TID With Meals, Bere Welch MD, 8 Units at 01/10/19 1726    melatonin tablet 3 mg, 3 mg, Oral, HS, Mary Malone MD, 3 mg at 01/10/19 2109    metoprolol tartrate (LOPRESSOR) tablet 25 mg, 25 mg, Oral, Q12H [facility], Mary Malone MD, 25 mg at 01/10/19 2109    ondansetron (ZOFRAN) injection 4 mg, 4 mg, Intravenous, Q6H PRN, Mary Malone MD    pantoprazole (PROTONIX) EC tablet 40 mg, 40 mg, Oral, Early Morning, Mary Malone MD, 40 mg at 01/11/19 0643    Invasive Devices:      Lab Results:     Results from last 7 days  Lab Units 01/11/19  0455 01/10/19  0442 01/09/19  1400 01/09/19  0356 01/09/19  0355 01/08/19  1954  01/06/19  0510   WBC Thousand/uL 8 43 9 93 10 13 9 61  --  9 64  < > 7 97   HEMOGLOBIN g/dL 9 7* 9 7* 9 2* 9 6*  --  10 0*  < > 10 0*   HEMATOCRIT % 31 4* 31 5* 29 1* 30 7*  --  32 6*  < > 32 5*   PLATELETS Thousands/uL 137* 118* 138* 125*  --  132*  < > 147*   POTASSIUM mmol/L 4 9 4 8 4 4  --  4 5 4 8  < > 4 3   CHLORIDE mmol/L 95* 94* 87*  --  88* 85*  < > 95*   CO2 mmol/L 28 30 33*  --  33* 36*  < > 32   BUN mg/dL 34* 27* 40*  --  37* 36*  < > 25   CREATININE mg/dL 4 02* 3 51* 4 48*  --  4 19* 4 03*  < > 3 25*   CALCIUM mg/dL 9 2 8 8 9 1  --  9 0 9 0  < > 8 1*   MAGNESIUM mg/dL  --  2 3  --   --   --   --   --   --    PHOSPHORUS mg/dL  --  2 2*  --   --   --   --   --   --    ALK PHOS U/L  --   --   --   --   --   --   --  61   ALT U/L  --   --   --   --   --   --   --  20   AST U/L  --   --   --   --   --   --   --  14   < > = values in this interval not displayed      Previous work up:  Please see previous notes

## 2019-01-12 LAB
ANION GAP SERPL CALCULATED.3IONS-SCNC: 6 MMOL/L (ref 4–13)
BASOPHILS # BLD AUTO: 0.05 THOUSANDS/ΜL (ref 0–0.1)
BASOPHILS NFR BLD AUTO: 1 % (ref 0–1)
BUN SERPL-MCNC: 22 MG/DL (ref 5–25)
CALCIUM SERPL-MCNC: 8.6 MG/DL (ref 8.3–10.1)
CHLORIDE SERPL-SCNC: 93 MMOL/L (ref 100–108)
CO2 SERPL-SCNC: 30 MMOL/L (ref 21–32)
CREAT SERPL-MCNC: 3.07 MG/DL (ref 0.6–1.3)
EOSINOPHIL # BLD AUTO: 0.05 THOUSAND/ΜL (ref 0–0.61)
EOSINOPHIL NFR BLD AUTO: 1 % (ref 0–6)
ERYTHROCYTE [DISTWIDTH] IN BLOOD BY AUTOMATED COUNT: 17.3 % (ref 11.6–15.1)
GFR SERPL CREATININE-BSD FRML MDRD: 19 ML/MIN/1.73SQ M
GLUCOSE SERPL-MCNC: 148 MG/DL (ref 65–140)
GLUCOSE SERPL-MCNC: 217 MG/DL (ref 65–140)
GLUCOSE SERPL-MCNC: 235 MG/DL (ref 65–140)
GLUCOSE SERPL-MCNC: 279 MG/DL (ref 65–140)
GLUCOSE SERPL-MCNC: 288 MG/DL (ref 65–140)
HCT VFR BLD AUTO: 31.8 % (ref 36.5–49.3)
HGB BLD-MCNC: 9.7 G/DL (ref 12–17)
IMM GRANULOCYTES # BLD AUTO: 0.05 THOUSAND/UL (ref 0–0.2)
IMM GRANULOCYTES NFR BLD AUTO: 1 % (ref 0–2)
LYMPHOCYTES # BLD AUTO: 1.26 THOUSANDS/ΜL (ref 0.6–4.47)
LYMPHOCYTES NFR BLD AUTO: 13 % (ref 14–44)
MCH RBC QN AUTO: 25.8 PG (ref 26.8–34.3)
MCHC RBC AUTO-ENTMCNC: 30.5 G/DL (ref 31.4–37.4)
MCV RBC AUTO: 85 FL (ref 82–98)
MONOCYTES # BLD AUTO: 0.94 THOUSAND/ΜL (ref 0.17–1.22)
MONOCYTES NFR BLD AUTO: 10 % (ref 4–12)
NEUTROPHILS # BLD AUTO: 7.13 THOUSANDS/ΜL (ref 1.85–7.62)
NEUTS SEG NFR BLD AUTO: 74 % (ref 43–75)
NRBC BLD AUTO-RTO: 0 /100 WBCS
PLATELET # BLD AUTO: 120 THOUSANDS/UL (ref 149–390)
PMV BLD AUTO: 12.1 FL (ref 8.9–12.7)
POTASSIUM SERPL-SCNC: 4.9 MMOL/L (ref 3.5–5.3)
RBC # BLD AUTO: 3.76 MILLION/UL (ref 3.88–5.62)
SODIUM SERPL-SCNC: 129 MMOL/L (ref 136–145)
WBC # BLD AUTO: 9.48 THOUSAND/UL (ref 4.31–10.16)

## 2019-01-12 PROCEDURE — 99231 SBSQ HOSP IP/OBS SF/LOW 25: CPT | Performed by: THORACIC SURGERY (CARDIOTHORACIC VASCULAR SURGERY)

## 2019-01-12 PROCEDURE — 99232 SBSQ HOSP IP/OBS MODERATE 35: CPT | Performed by: INTERNAL MEDICINE

## 2019-01-12 PROCEDURE — 80048 BASIC METABOLIC PNL TOTAL CA: CPT | Performed by: INTERNAL MEDICINE

## 2019-01-12 PROCEDURE — 82948 REAGENT STRIP/BLOOD GLUCOSE: CPT

## 2019-01-12 PROCEDURE — 85025 COMPLETE CBC W/AUTO DIFF WBC: CPT | Performed by: INTERNAL MEDICINE

## 2019-01-12 RX ORDER — DOCUSATE SODIUM 100 MG/1
100 CAPSULE, LIQUID FILLED ORAL 2 TIMES DAILY PRN
Status: DISCONTINUED | OUTPATIENT
Start: 2019-01-12 | End: 2019-01-16 | Stop reason: HOSPADM

## 2019-01-12 RX ORDER — POLYETHYLENE GLYCOL 3350 17 G/17G
17 POWDER, FOR SOLUTION ORAL DAILY PRN
Status: DISCONTINUED | OUTPATIENT
Start: 2019-01-12 | End: 2019-01-12

## 2019-01-12 RX ORDER — POLYETHYLENE GLYCOL 3350 17 G/17G
17 POWDER, FOR SOLUTION ORAL DAILY PRN
Status: DISCONTINUED | OUTPATIENT
Start: 2019-01-12 | End: 2019-01-16 | Stop reason: HOSPADM

## 2019-01-12 RX ADMIN — CHLORHEXIDINE GLUCONATE 0.12% ORAL RINSE 15 ML: 1.2 LIQUID ORAL at 21:45

## 2019-01-12 RX ADMIN — DOCUSATE SODIUM 100 MG: 100 CAPSULE, LIQUID FILLED ORAL at 02:01

## 2019-01-12 RX ADMIN — Medication 1 CAPSULE: at 17:11

## 2019-01-12 RX ADMIN — BENZONATATE 100 MG: 100 CAPSULE ORAL at 21:45

## 2019-01-12 RX ADMIN — FUROSEMIDE 40 MG: 40 TABLET ORAL at 10:22

## 2019-01-12 RX ADMIN — MELATONIN 3 MG: at 21:45

## 2019-01-12 RX ADMIN — CHLORHEXIDINE GLUCONATE 0.12% ORAL RINSE 15 ML: 1.2 LIQUID ORAL at 10:22

## 2019-01-12 RX ADMIN — INSULIN LISPRO 3 UNITS: 100 INJECTION, SOLUTION INTRAVENOUS; SUBCUTANEOUS at 21:46

## 2019-01-12 RX ADMIN — INSULIN GLARGINE 24 UNITS: 100 INJECTION, SOLUTION SUBCUTANEOUS at 21:45

## 2019-01-12 RX ADMIN — FUROSEMIDE 40 MG: 40 TABLET ORAL at 17:11

## 2019-01-12 RX ADMIN — HEPARIN SODIUM 5000 UNITS: 5000 INJECTION INTRAVENOUS; SUBCUTANEOUS at 06:28

## 2019-01-12 RX ADMIN — BENZONATATE 100 MG: 100 CAPSULE ORAL at 17:11

## 2019-01-12 RX ADMIN — ATORVASTATIN CALCIUM 40 MG: 40 TABLET, FILM COATED ORAL at 17:11

## 2019-01-12 RX ADMIN — HEPARIN SODIUM 5000 UNITS: 5000 INJECTION INTRAVENOUS; SUBCUTANEOUS at 21:48

## 2019-01-12 RX ADMIN — METOPROLOL TARTRATE 25 MG: 25 TABLET, FILM COATED ORAL at 21:45

## 2019-01-12 RX ADMIN — PANTOPRAZOLE SODIUM 40 MG: 40 TABLET, DELAYED RELEASE ORAL at 06:28

## 2019-01-12 RX ADMIN — INSULIN LISPRO 4 UNITS: 100 INJECTION, SOLUTION INTRAVENOUS; SUBCUTANEOUS at 17:12

## 2019-01-12 RX ADMIN — BENZONATATE 100 MG: 100 CAPSULE ORAL at 10:22

## 2019-01-12 RX ADMIN — INSULIN LISPRO 3 UNITS: 100 INJECTION, SOLUTION INTRAVENOUS; SUBCUTANEOUS at 06:45

## 2019-01-12 RX ADMIN — INSULIN LISPRO 10 UNITS: 100 INJECTION, SOLUTION INTRAVENOUS; SUBCUTANEOUS at 17:12

## 2019-01-12 RX ADMIN — HEPARIN SODIUM 5000 UNITS: 5000 INJECTION INTRAVENOUS; SUBCUTANEOUS at 17:11

## 2019-01-12 RX ADMIN — POLYETHYLENE GLYCOL 3350 17 G: 17 POWDER, FOR SOLUTION ORAL at 02:01

## 2019-01-12 RX ADMIN — METOPROLOL TARTRATE 25 MG: 25 TABLET, FILM COATED ORAL at 10:21

## 2019-01-12 RX ADMIN — ACETAMINOPHEN 650 MG: 325 TABLET, FILM COATED ORAL at 03:51

## 2019-01-12 RX ADMIN — ASPIRIN 81 MG 81 MG: 81 TABLET ORAL at 10:22

## 2019-01-12 NOTE — PROGRESS NOTES
General Cardiology Progress Note   Emily Hamilton 72 y o  male MRN: [id number]  Unit/Bed#: [facility] 403-01 Encounter: [id number]      Assessment:  Principal Problem:    MI, acute, non ST segment elevation (HCC)  Active Problems:    NATALIIA (acute kidney injury) ([address] )    Acute on chronic congestive heart failure (HCC)    Type 2 diabetes mellitus with kidney complication, with long-term current use of insulin (HCC)    Dysphagia    Weight loss    Coronary artery disease involving native coronary artery    Disease of cardiovascular system      Impression:    CAD  Left main and ostial LAD disease  Severe ischemic cardiomyopathy  Dyspnea on exertion is primary symptom, he is euvolemic on examination  Acute on chronic systolic CHF  Volume managed by hemodialysis    Acute kidney injury on CKD resulting in need for hemodialysis    Chronic debility  Weight loss    Plan:    Continue physical therapy  CABG planned for Monday  He is high risk for prolonged postoperative course  Patient understands the risks, wishes to proceed    Subjective:   Patient seen and examined  Tired, no chest pain, remains dyspneic with any ambulation but did ambulate 80 ft yesterday with a walker and physical therapy    Objective   Vitals: Blood pressure 126/64, pulse 84, temperature 98 5 °F (36 9 °C), temperature source Oral, resp  rate 16, height 5' 5" (1 651 m), weight 60 4 kg (133 lb 2 5 oz), SpO2 94 %  , Body mass index is 22 16 kg/m² , I/O last 3 completed shifts: In: 1360 [P O :860; I V :500]  Out: 3850 [Urine:850; Other:3000]  I/O this shift:  In: 300 [P O :300]  Out: -   Wt Readings from Last 3 Encounters:   01/12/19 60 4 kg (133 lb 2 5 oz)   01/08/19 67 kg (147 lb 11 3 oz)       Intake/Output Summary (Last 24 hours) at 01/12/19 1041  Last data filed at 01/12/19 0802   Gross per 24 hour   Intake             1040 ml   Output             3400 ml   Net            -2360 ml     I/O last 3 completed shifts:   In: 1360 [P O :860; I V :500]  Out: 055 579 91 89 [Urine:850; Other:3000]    No significant arrhythmias seen on telemetry review       Physical Exam:    GEN: Elias Andrade appears somewhat lethargic, but awakens to voice, he is alert and oriented  NECK: supple, no carotid bruits, no JVD or HJR  HEART: normal rate, regular rhythm, normal S1 and S2, no murmur  LUNGS: clear to auscultation bilaterally; no wheezes, rales, or rhonchi   ABDOMEN: normal bowel sounds, soft, no tenderness, no distention  EXTREMITIES: peripheral pulses normal; no clubbing, cyanosis, or edema      Meds/Allergies   No Known Allergies    Current Facility-Administered Medications:     acetaminophen (TYLENOL) tablet 650 mg, 650 mg, Oral, Q6H PRN, Valentino Barnett MD, 650 mg at 01/12/19 0351    aspirin chewable tablet 81 mg, 81 mg, Oral, Daily, Valentino Barnett MD, 81 mg at 01/12/19 1022    atorvastatin (LIPITOR) tablet 40 mg, 40 mg, Oral, Daily With Chino Martinez MD, 40 mg at 01/11/19 1735    b complex-vitamin C-folic acid (NEPHROCAPS) capsule 1 capsule, 1 capsule, Oral, Daily With Chino Martinez MD, 1 capsule at 01/11/19 1735    benzonatate (TESSALON PERLES) capsule 100 mg, 100 mg, Oral, TID, Valentino Barnett MD, 100 mg at 01/12/19 1022    calcium carbonate (TUMS) chewable tablet 500 mg, 500 mg, Oral, Daily PRN, Edita Forman MD, 500 mg at 01/11/19 0021    chlorhexidine (PERIDEX) 0 12 % oral rinse 15 mL, 15 mL, Swish & Spit, Q12H [address], Sylvia Martínez PA-C, 15 mL at 01/12/19 1022    docusate sodium (COLACE) capsule 100 mg, 100 mg, Oral, BID PRN, Edita Forman MD, 100 mg at 01/12/19 0201    epoetin deepa (EPOGEN,PROCRIT) injection 2,000 Units, 2,000 Units, Intravenous, After Dialysis, 2,000 Units at 01/11/19 0810 **AND** epoetin deepa (EPOGEN,PROCRIT) injection 3,000 Units, 3,000 Units, Intravenous, After Dialysis, Valentino Barnett MD, 3,000 Units at 01/11/19 0810    furosemide (LASIX) tablet 40 mg, 40 mg, Oral, BID (diuretic), James Mcmullen Hunter Conte MD, 40 mg at 01/12/19 1022    heparin (porcine) subcutaneous injection 5,000 Units, 5,000 Units, Subcutaneous, Q8H [facility], CHANELL Selby, 5,000 Units at 01/12/19 0628    insulin glargine (LANTUS) subcutaneous injection 24 Units 0 24 mL, 24 Units, Subcutaneous, HS, Oriana Leroy DO, 24 Units at 01/11/19 2231    insulin lispro (HumaLOG) 100 units/mL subcutaneous injection 1-5 Units, 1-5 Units, Subcutaneous, HS, Lindy Aden MD, 1 Units at 01/10/19 2110    insulin lispro (HumaLOG) 100 units/mL subcutaneous injection 1-6 Units, 1-6 Units, Subcutaneous, TID AC, 3 Units at 01/12/19 0645 **AND** Fingerstick Glucose (POCT), , , TID AC, Lindy Aden MD    insulin lispro (HumaLOG) 100 units/mL subcutaneous injection 10 Units, 10 Units, Subcutaneous, Daily With Marcial Nagy DO, 10 Units at 01/11/19 1735    insulin lispro (HumaLOG) 100 units/mL subcutaneous injection 8 Units, 8 Units, Subcutaneous, BID before breakfast/lunch, Oriana Leroy DO, 8 Units at 01/12/19 0646    melatonin tablet 3 mg, 3 mg, Oral, HS, Lindy Aden MD, 3 mg at 01/11/19 2230    metoprolol tartrate (LOPRESSOR) tablet 25 mg, 25 mg, Oral, Q12H [facility], Lindy Aden MD, 25 mg at 01/12/19 1021    ondansetron (ZOFRAN) injection 4 mg, 4 mg, Intravenous, Q6H PRN, Lindy Aden MD    pantoprazole (PROTONIX) EC tablet 40 mg, 40 mg, Oral, Early Morning, Lindy Aden MD, 40 mg at 01/12/19 0628    polyethylene glycol (MIRALAX) packet 17 g, 17 g, Oral, Daily PRN, Edita Forman MD, 17 g at 01/12/19 0201    Laboratory Results:    Results from last 7 days  Lab Units 01/06/19  2347 01/06/19 2029 01/06/19  1721   TROPONIN I ng/mL 0 28* 0 30* 0 33*       CBC with diff:   Results from last 7 days  Lab Units 01/12/19  0446 01/11/19  0455 01/10/19  0442 01/09/19  1400 01/09/19  0356 01/08/19  1954 01/08/19  0449 01/07/19  0624   WBC Thousand/uL 9 48 8 43 9 93 10 13 9 61 9 64 8 74 10 27* HEMOGLOBIN g/dL 9 7* 9 7* 9 7* 9 2* 9 6* 10 0* 10 0* 9 6*   HEMATOCRIT % 31 8* 31 4* 31 5* 29 1* 30 7* 32 6* 32 0* 30 6*   MCV fL 85 85 84 82 83 84 83 82   PLATELETS Thousands/uL 120* 137* 118* 138* 125* 132* 104* 149   MCH pg 25 8* 26 1* 25 9* 26 1* 26 1* 25 6* 26 0* 25 8*   MCHC g/dL 30 5* 30 9* 30 8* 31 6 31 3* 30 7* 31 3* 31 4   RDW % 17 3* 17 1* 16 7* 16 5* 16 3* 16 4* 16 6* 16 2*   MPV fL 12 1 12 4 12 5 12 2 11 5 12 0 10 8 11 3   NRBC AUTO /100 WBCs 0 0 0 0 0  --  0 0       CMP:  Results from last 7 days  Lab Units 01/12/19  0446 01/11/19  0455 01/10/19  0442 01/09/19  1400 01/09/19  0355 01/08/19  1954 01/08/19  0449  01/06/19  0510   POTASSIUM mmol/L 4 9 4 9 4 8 4 4 4 5 4 8 5 1  < > 4 3   CHLORIDE mmol/L 93* 95* 94* 87* 88* 85* 92*  < > 95*   CO2 mmol/L 30 28 30 33* 33* 36* 35*  < > 32   BUN mg/dL 22 34* 27* 40* 37* 36* 28*  < > 25   CREATININE mg/dL 3 07* 4 02* 3 51* 4 48* 4 19* 4 03* 3 48*  < > 3 25*   CALCIUM mg/dL 8 6 9 2 8 8 9 1 9 0 9 0 8 7  < > 8 1*   AST U/L  --   --   --   --   --   --   --   --  14   ALT U/L  --   --   --   --   --   --   --   --  20   ALK PHOS U/L  --   --   --   --   --   --   --   --  61   EGFR ml/min/1 73sq m 19 14 17 12 13 14 17  < > 18   < > = values in this interval not displayed  BMP:  Results from last 7 days  Lab Units 01/12/19  0446 01/11/19  0455 01/10/19  0442 01/09/19  1400 01/09/19  0355 01/08/19  1954 01/08/19  0449   POTASSIUM mmol/L 4 9 4 9 4 8 4 4 4 5 4 8 5 1   CHLORIDE mmol/L 93* 95* 94* 87* 88* 85* 92*   CO2 mmol/L 30 28 30 33* 33* 36* 35*   BUN mg/dL 22 34* 27* 40* 37* 36* 28*   CREATININE mg/dL 3 07* 4 02* 3 51* 4 48* 4 19* 4 03* 3 48*   CALCIUM mg/dL 8 6 9 2 8 8 9 1 9 0 9 0 8 7       NT-proBNP: No results for input(s): NTBNP in the last 72 hours       Magnesium:   Results from last 7 days  Lab Units 01/10/19  0442   MAGNESIUM mg/dL 2 3       Coags:   Results from last 7 days  Lab Units 01/10/19  0232 01/09/19  1852 01/09/19  1140 01/09/19  0355 19  1953 19  1250 19  0449  19  1502   PTT seconds 58* 64* 96* 44* 59* 55* 95*  < > 41*   INR   --   --   --   --   --   --   --   --  1 09   < > = values in this interval not displayed  TSH:        Hemoglobin A1C )      Lipid Profile:   No results found for: CHOL  Lab Results   Component Value Date    HDL 48 01/10/2019     Lab Results   Component Value Date    LDLCALC 58 01/10/2019     No results found for: LDLDIRECT  Lab Results   Component Value Date    TRIG 65 01/10/2019       Cardiac testing:   MARQUISE revealed moderate MR, moderate TR, EF 30%    Results for orders placed during the hospital encounter of 18   Cardiac catheterization    Narrative [address] [place] [address] [phone]    [facility]    Invasive Cardiovascular Lab Complete Report    Patient: Jennifer Phelps  MR number: [id number]  Account number: [de-identified]  Study date: 2019  Gender: Male  : 1945  Height: 65 in  Weight: 165 lb  BSA: 1 82 m squared    Allergies: NO KNOWN ALLERGIES    Interventional Cardiologist:  Alexei Huizar MD    SUMMARY    CORONARY CIRCULATION:  Distal left main: There was a tubular 50 % stenosis  Proximal LAD: There was a tubular 90 % stenosis  2nd diagonal: The vessel was small sized  Angiography showed minor luminal irregularities  Ostial circumflex: The vessel was large sized (dominant)  2nd obtuse marginal: The vessel was small sized  Angiography showed mild atherosclerosis  3rd obtuse marginal: The vessel was small sized  Angiography showed mild atherosclerosis  Left posterior descending artery: There was a 90 % stenosis  HEMODYNAMICS:  Hemodynamic assessment demonstrated mildly to moderately elevated LVEDP  RECOMMENDATIONS:  Consultation with a cardiac surgeon should be obtained for coronary artery bypass grafting  INDICATIONS:  --  Possible CAD: myocardial infarction without ST elevation (NSTEMI)    -- Congestive heart failure with cardiomyopathy  --  Cardiac: dyspnea and chest pain  PROCEDURES PERFORMED    --  Left heart catheterization without ventriculogram   --  Left coronary angiography  --  Right coronary angiography  --  Inpatient  --  Coronary Catheterization (w/ [facility])  --  Mod Sedation Same Physician Initial 15min  --  Mod Sedation Same Physician Add 15min  PROCEDURE: The risks and alternatives of the procedures and conscious sedation were explained to the patient and informed consent was obtained  The patient was brought to the cath lab and placed on the table  The planned puncture sites  were prepped and draped in the usual sterile fashion  --  Right femoral artery access  The puncture site was infiltrated with local anesthetic  The vessel was accessed using the modified Seldinger technique, a wire was advanced into the vessel, and a sheath was advanced over the wire into the  vessel  --  Left heart catheterization without ventriculogram  A catheter was advanced over a guidewire into the ascending aorta  After recording ascending aortic pressure, the catheter was advanced across the aortic valve and left ventricular  pressure was recorded  The catheter was pulled back across the aortic valve and into the ascending aorta and pullback pressures were obtained  --  Left coronary artery angiography  A catheter was advanced over a guidewire into the aorta and positioned in the left coronary artery ostium under fluoroscopic guidance  Angiography was performed  --  Right coronary artery angiography  A catheter was advanced over a guidewire into the aorta and positioned in the right coronary artery ostium under fluoroscopic guidance  Angiography was performed  --  Inpatient  --  Coronary Catheterization (w/ [facility])  --  Mod Sedation Same Physician Initial 15min  --  Mod Sedation Same Physician Add 15min      PROCEDURE COMPLETION: The patient tolerated the procedure well and was discharged from the cath lab  TIMING: Test started at 08:27  Test concluded at 08:48  HEMOSTASIS: The sheath was removed  The site was compressed manually  Hemostasis  was obtained  MEDICATIONS GIVEN: Versed (2mg/2ml), 1 mg, IV, at 08:20  Fentanyl (1OOmcg/2 ml), 50 mcg, IV, at 08:20  1% Lidocaine, 9 ml, subcutaneously, at 08:28  CONTRAST GIVEN: 40 ml Visipaque-(320mg I /ml)  RADIATION EXPOSURE:  Fluoroscopy time: 1 85 min  HEMODYNAMICS: Hemodynamic assessment demonstrated mildly to moderately elevated LVEDP  VALVES:  AORTIC VALVE:   --  There was no aortic stenosis  CORONARY VESSELS:   --  Distal left main: There was a tubular 50 % stenosis  --  LAD: The vessel was normal sized  --  Proximal LAD: There was a tubular 90 % stenosis  --  1st diagonal: The vessel was small sized  Angiography showed minor luminal irregularities  --  2nd diagonal: The vessel was small sized  Angiography showed minor luminal irregularities  --  Circumflex: The vessel was normal sized  Angiography showed mild atherosclerosis  --  Ostial circumflex: The vessel was large sized (dominant)  There was a 30 % stenosis  --  1st obtuse marginal: The vessel was small sized  Angiography showed severe atherosclerosis  --  2nd obtuse marginal: The vessel was small sized  Angiography showed mild atherosclerosis  --  3rd obtuse marginal: The vessel was small sized  Angiography showed mild atherosclerosis  --  Left posterior descending artery: There was a 90 % stenosis  --  RCA: The vessel was small (non-dominant)  Angiography showed moderate atherosclerosis  RECOMMENDATIONS  Consultation with a cardiac surgeon should be obtained for coronary artery bypass grafting  DISPOSITION:  The patient left the catheterization laboratory in stable condition      Prepared and signed by    Kristyn Rodriguez MD  Signed 01/07/2019 10:29:53    Study diagram    Angiographic findings  Native coronary lesions:  ·Distal left main: Lesion 1: tubular, 50 % stenosis  ·Proximal LAD: Lesion 1: tubular, 90 % stenosis  ·Ostial circumflex: Lesion 1: 30 % stenosis  ·LPDA: Lesion 1: 90 % stenosis  [Vessel not pictured]    Hemodynamic tables    Pressures:  Baseline  Pressures:  - HR: 85  Pressures:  - Rhythm:  Pressures:  -- Aortic Pressure (S/D/M): 142/61/86  Pressures:  -- Left Ventricle (s/edp): 144/28/--    Outputs:  Baseline  Outputs:  -- CALCULATIONS: [age]  Outputs:  -- CALCULATIONS: Body Surface Area: 1 82  Outputs:  -- CALCULATIONS: Height in cm: 165 00  Outputs:  -- CALCULATIONS: Sex: Male  Outputs:  -- CALCULATIONS: Weight in k 00       No results found for this or any previous visit  No results found for this or any previous visit  No results found for this or any previous visit  Odin Becerril MD    Portions of the record may have been created with voice recognition software   Occasional wrong word or "sound a like" substitutions may have occurred due to the inherent limitations of voice recognition software   Read the chart carefully and recognize, using context, where substitutions have occurred

## 2019-01-12 NOTE — PROGRESS NOTES
NEPHROLOGY PROGRESS NOTE   Hina Castaneda 72 y o  male MRN: [id number]  Unit/Bed#: [facility] 403-01 Encounter: [id number]      ASSESSMENT & PLAN:    72-year-old with a past medical history ischemic cardiomyopathy with an EF 35%, hypertension, hyperlipidemia stage 4 chronic kidney disease that has progressed to end-stage kidney disease here for evaluation of a CABG    1  Acute kidney injury that has progressed end-stage kidney disease  2  Systolic congestive heart failure  3  Hyponatremia  4  Hypertension  5  Anemia of chronic kidney disease  6  CKD bone mineral disease  7   Multi-vessel Coronary artery disease    -having workup for CABG  -states he is feeling better today urine output on the lower side yesterday  -tolerated ultrafiltration of about 3 L total  -will monitor over the week  -24 hr creatinine clearance scheduled for tomorrow  -will continue volume removal on dialysis  -sodium remains on the lower side corrected for glucose is likely around 130 hopefully with ultrafiltration and sodium will improve as well    SUBJECTIVE:    Patient states breathing is better today and feels better denies any chest pain shortness of breath    OBJECTIVE:  Current Weight: Weight - Scale: 60 4 kg (133 lb 2 5 oz)  Vitals:    01/12/19 0715   BP: 126/64   Pulse: 84   Resp: 16   Temp: 98 5 °F (36 9 °C)   SpO2: 94%       Intake/Output Summary (Last 24 hours) at 01/12/19 0845  Last data filed at 01/12/19 0802   Gross per 24 hour   Intake             1040 ml   Output             3400 ml   Net            -2360 ml       General: conscious, cooperative, in not acute distress  Eyes: conjunctivae pink, anicteric sclerae  ENT: lips and mucous membranes moist  Neck: supple, no JVD  Chest: clear breath sounds bilateral, no crackles, ronchus or wheezings  CVS: distinct S1 & S2, normal rate, regular rhythm  Abdomen: soft, non-tender, non-distended, normoactive bowel sounds  Extremities: no edema of both legs  Skin: no rash  Neuro: awake, alert, oriented    Medications:    Current Facility-Administered Medications:     acetaminophen (TYLENOL) tablet 650 mg, 650 mg, Oral, Q6H PRN, Radha Hylton MD, 650 mg at 01/12/19 0351    aspirin chewable tablet 81 mg, 81 mg, Oral, Daily, Radha Hylton MD, 81 mg at 01/11/19 1406    atorvastatin (LIPITOR) tablet 40 mg, 40 mg, Oral, Daily With Aleshia Lei MD, 40 mg at 01/11/19 1735    b complex-vitamin C-folic acid (NEPHROCAPS) capsule 1 capsule, 1 capsule, Oral, Daily With Aleshia Lei MD, 1 capsule at 01/11/19 1735    benzonatate (TESSALON PERLES) capsule 100 mg, 100 mg, Oral, TID, Radha Hylton MD, 100 mg at 01/11/19 2230    calcium carbonate (TUMS) chewable tablet 500 mg, 500 mg, Oral, Daily PRN, Edita Forman MD, 500 mg at 01/11/19 0021    chlorhexidine (PERIDEX) 0 12 % oral rinse 15 mL, 15 mL, Swish & Spit, Q12H [address], Lawrnce Claude, PA-C, 15 mL at 01/11/19 2231    docusate sodium (COLACE) capsule 100 mg, 100 mg, Oral, BID PRN, Edita Forman MD, 100 mg at 01/12/19 0201    epoetin deepa (EPOGEN,PROCRIT) injection 2,000 Units, 2,000 Units, Intravenous, After Dialysis, 2,000 Units at 01/11/19 0810 **AND** epoetin deepa (EPOGEN,PROCRIT) injection 3,000 Units, 3,000 Units, Intravenous, After Dialysis, Radha Hylton MD, 3,000 Units at 01/11/19 0810    furosemide (LASIX) tablet 40 mg, 40 mg, Oral, BID (diuretic), Radha Hylton MD, 40 mg at 01/11/19 1735    heparin (porcine) subcutaneous injection 5,000 Units, 5,000 Units, Subcutaneous, Q8H [address], CHANELL Selby, 5,000 Units at 01/12/19 0628    insulin glargine (LANTUS) subcutaneous injection 24 Units 0 24 mL, 24 Units, Subcutaneous, HS, Kathrine Real DO, 24 Units at 01/11/19 2231    insulin lispro (HumaLOG) 100 units/mL subcutaneous injection 1-5 Units, 1-5 Units, Subcutaneous, HS, Radha Hylton MD, 1 Units at 01/10/19 2110    insulin lispro (HumaLOG) 100 units/mL subcutaneous injection 1-6 Units, 1-6 Units, Subcutaneous, TID AC, 3 Units at 01/12/19 0645 **AND** Fingerstick Glucose (POCT), , , TID AC, Lindy Aden MD    insulin lispro (HumaLOG) 100 units/mL subcutaneous injection 10 Units, 10 Units, Subcutaneous, Daily With Marcial Nagy, DO, 10 Units at 01/11/19 1735    insulin lispro (HumaLOG) 100 units/mL subcutaneous injection 8 Units, 8 Units, Subcutaneous, BID before breakfast/lunch, Oriana Leroy DO, 8 Units at 01/12/19 0646    melatonin tablet 3 mg, 3 mg, Oral, HS, Lindy Aden MD, 3 mg at 01/11/19 2230    metoprolol tartrate (LOPRESSOR) tablet 25 mg, 25 mg, Oral, Q12H [facility], Lindy Aden MD, 25 mg at 01/11/19 2230    ondansetron (ZOFRAN) injection 4 mg, 4 mg, Intravenous, Q6H PRN, Lindy Aden MD    pantoprazole (PROTONIX) EC tablet 40 mg, 40 mg, Oral, Early Morning, Lindy Aden MD, 40 mg at 01/12/19 6039    polyethylene glycol (MIRALAX) packet 17 g, 17 g, Oral, Daily PRN, Edita Forman MD, 17 g at 01/12/19 0201    Invasive Devices:      Lab Results:     Results from last 7 days  Lab Units 01/12/19  0446 01/11/19  0455 01/10/19  0442 01/09/19  1400 01/09/19  0356 01/09/19  0355  01/06/19  0510   WBC Thousand/uL 9 48 8 43 9 93 10 13 9 61  --   < > 7 97   HEMOGLOBIN g/dL 9 7* 9 7* 9 7* 9 2* 9 6*  --   < > 10 0*   HEMATOCRIT % 31 8* 31 4* 31 5* 29 1* 30 7*  --   < > 32 5*   PLATELETS Thousands/uL 120* 137* 118* 138* 125*  --   < > 147*   POTASSIUM mmol/L 4 9 4 9 4 8 4 4  --  4 5  < > 4 3   CHLORIDE mmol/L 93* 95* 94* 87*  --  88*  < > 95*   CO2 mmol/L 30 28 30 33*  --  33*  < > 32   BUN mg/dL 22 34* 27* 40*  --  37*  < > 25   CREATININE mg/dL 3 07* 4 02* 3 51* 4 48*  --  4 19*  < > 3 25*   CALCIUM mg/dL 8 6 9 2 8 8 9 1  --  9 0  < > 8 1*   MAGNESIUM mg/dL  --   --  2 3  --   --   --   --   --    PHOSPHORUS mg/dL  --   --  2 2*  --   --   --   --   --    ALK PHOS U/L  --   --   --   --   --   --   --  61   ALT U/L  --   -- --   --   --   --   --  20   AST U/L  --   --   --   --   --   --   --  14   < > = values in this interval not displayed      Previous work up:  Please see previous notes

## 2019-01-12 NOTE — PROGRESS NOTES
IM Residency Progress Note   Unit/Bed#: [facility] 403-01 Encounter: [id number]  SOD Team A      Lidia Dunlap 72 y o  male [id number]   Hospital Stay Days: 4      Assessment/Plan:    Coronary artery disease - continue aspirin, lipitor  Patient with multivessel disease per cardiac cath  - patient is scheduled for CABG on Monday    Congestive heart failure  Acute on chronic congestive heart failure with an EF of 35 %  - MARQUISE demonstrates EF of 30%, diffuse hypokinesis, moderate mitral regurgitation  - continue furosemide 40 mg 2 x a day, lopressor 25 mg every 12 hours     Type 2 diabetes - currently on Lantus 24 units, Humalog 10 units with dinner, 8 units before breakfast and 10 units before lunch + insulin sliding scale   Hemoglobin A1c of 8 2 on admission  - endocrinology following    Dysphagia - Patient complains of dysphagia and associated 30 lb weight loss over the past 6 months  - EGD - no esophagitis or anatomic obstruction  - appreciate speech therapy recommendations  - dysphagia diet     Acute kidney injury - baseline Cr unknown  - patient is currently on epogen injections  - hemodialysis on MWF     Anxiety - currently not on any medications    Constipation - bowel regimen - colace + miralax prn  - had small bowel movement, will monitor possibly add milk of magnesia if needed       Principal Problem:    MI, acute, non ST segment elevation (HCC)  Active Problems:    NATALIIA (acute kidney injury) ([facility] [address] )    Acute on chronic congestive heart failure (HCC)    Type 2 diabetes mellitus with kidney complication, with long-term current use of insulin (HCC)    Dysphagia    Weight loss    Coronary artery disease involving native coronary artery    Disease of cardiovascular system        Disposition: per SOD, cardiac surgery, scheduled for coronary artery bypass graft on Monday      Subjective:  Patient without complaints          Vitals: Temp (24hrs), Av 2 °F (36 8 °C), Min:97 8 °F (36 6 °C), Max:98 5 °F (36 9 °C)  Current: Temperature: 98 5 °F (36 9 °C)  Vitals:    01/11/19 2256 01/12/19 0337 01/12/19 0526 01/12/19 0715   BP: 131/67 132/52  126/64   BP Location: Right arm Right arm  Right arm   Pulse: 84 79  84   Resp: 18 16  16   Temp: 98 1 °F (36 7 °C) 98 1 °F (36 7 °C)  98 5 °F (36 9 °C)   TempSrc: Oral Oral  Oral   SpO2: 100% 100%  94%   Weight:   60 4 kg (133 lb 2 5 oz)    Height:        Body mass index is 22 16 kg/m²  I/O last 24 hours: In: 5429 [P O :1160; I V :500]  Out: 3400 [Urine:400; Other:3000]      Physical Exam:   Physical Exam   Constitutional: He appears well-developed and well-nourished  HENT:   Head: Normocephalic and atraumatic  Eyes: Pupils are equal, round, and reactive to light  Conjunctivae are normal    Neck: Neck supple  Cardiovascular: Normal rate and regular rhythm  No murmur heard  Pulmonary/Chest: Effort normal and breath sounds normal  No respiratory distress  Abdominal: Soft  He exhibits no distension  There is no tenderness  Musculoskeletal: He exhibits no edema  Neurological: He is alert  Skin: Skin is warm and dry  Psychiatric: He has a normal mood and affect         Invasive Devices     Peripheral Intravenous Line            Peripheral IV 01/09/19 Left Forearm 2 days          Hemodialysis Catheter            Permanent HD Catheter  8 days                Labs:   Recent Results (from the past 24 hour(s))   Fingerstick Glucose (POCT)    Collection Time: 01/11/19 12:45 PM   Result Value Ref Range    POC Glucose 83 65 - 140 mg/dl   Fingerstick Glucose (POCT)    Collection Time: 01/11/19  3:39 PM   Result Value Ref Range    POC Glucose 190 (H) 65 - 140 mg/dl   Fingerstick Glucose (POCT)    Collection Time: 01/11/19  8:48 PM   Result Value Ref Range    POC Glucose 136 65 - 140 mg/dl   Basic metabolic panel    Collection Time: 01/12/19  4:46 AM   Result Value Ref Range    Sodium 129 (L) 136 - 145 mmol/L    Potassium 4 9 3 5 - 5 3 mmol/L    Chloride 93 (L) 100 - 108 mmol/L    CO2 30 21 - 32 mmol/L    ANION GAP 6 4 - 13 mmol/L    BUN 22 5 - 25 mg/dL    Creatinine 3 07 (H) 0 60 - 1 30 mg/dL    Glucose 217 (H) 65 - 140 mg/dL    Calcium 8 6 8 3 - 10 1 mg/dL    eGFR 19 ml/min/1 73sq m   CBC and differential    Collection Time: 01/12/19  4:46 AM   Result Value Ref Range    WBC 9 48 4 31 - 10 16 Thousand/uL    RBC 3 76 (L) 3 88 - 5 62 Million/uL    Hemoglobin 9 7 (L) 12 0 - 17 0 g/dL    Hematocrit 31 8 (L) 36 5 - 49 3 %    MCV 85 82 - 98 fL    MCH 25 8 (L) 26 8 - 34 3 pg    MCHC 30 5 (L) 31 4 - 37 4 g/dL    RDW 17 3 (H) 11 6 - 15 1 %    MPV 12 1 8 9 - 12 7 fL    Platelets 863 (L) 036 - 390 Thousands/uL    nRBC 0 /100 WBCs    Neutrophils Relative 74 43 - 75 %    Immat GRANS % 1 0 - 2 %    Lymphocytes Relative 13 (L) 14 - 44 %    Monocytes Relative 10 4 - 12 %    Eosinophils Relative 1 0 - 6 %    Basophils Relative 1 0 - 1 %    Neutrophils Absolute 7 13 1 85 - 7 62 Thousands/µL    Immature Grans Absolute 0 05 0 00 - 0 20 Thousand/uL    Lymphocytes Absolute 1 26 0 60 - 4 47 Thousands/µL    Monocytes Absolute 0 94 0 17 - 1 22 Thousand/µL    Eosinophils Absolute 0 05 0 00 - 0 61 Thousand/µL    Basophils Absolute 0 05 0 00 - 0 10 Thousands/µL   Fingerstick Glucose (POCT)    Collection Time: 01/12/19  5:49 AM   Result Value Ref Range    POC Glucose 235 (H) 65 - 140 mg/dl       Radiology Results: I have personally reviewed pertinent reports  Other Diagnostic Testing:   I have personally reviewed pertinent reports          Active Meds:   Current Facility-Administered Medications   Medication Dose Route Frequency    acetaminophen (TYLENOL) tablet 650 mg  650 mg Oral Q6H PRN    aspirin chewable tablet 81 mg  81 mg Oral Daily    atorvastatin (LIPITOR) tablet 40 mg  40 mg Oral Daily With Dinner    b complex-vitamin C-folic acid (NEPHROCAPS) capsule 1 capsule  1 capsule Oral Daily With Dinner    benzonatate (TESSALON PERLES) capsule 100 mg  100 mg Oral TID    calcium carbonate (TUMS) chewable tablet 500 mg  500 mg Oral Daily PRN    chlorhexidine (PERIDEX) 0 12 % oral rinse 15 mL  15 mL Swish & Spit Q12H [facility]    docusate sodium (COLACE) capsule 100 mg  100 mg Oral BID PRN    epoetin deepa (EPOGEN,PROCRIT) injection 2,000 Units  2,000 Units Intravenous After Dialysis    And    epoetin deepa (EPOGEN,PROCRIT) injection 3,000 Units  3,000 Units Intravenous After Dialysis    furosemide (LASIX) tablet 40 mg  40 mg Oral BID (diuretic)    heparin (porcine) subcutaneous injection 5,000 Units  5,000 Units Subcutaneous Q8H [facility]    insulin glargine (LANTUS) subcutaneous injection 24 Units 0 24 mL  24 Units Subcutaneous HS    insulin lispro (HumaLOG) 100 units/mL subcutaneous injection 1-5 Units  1-5 Units Subcutaneous HS    insulin lispro (HumaLOG) 100 units/mL subcutaneous injection 1-6 Units  1-6 Units Subcutaneous TID AC    insulin lispro (HumaLOG) 100 units/mL subcutaneous injection 10 Units  10 Units Subcutaneous Daily With Dinner    insulin lispro (HumaLOG) 100 units/mL subcutaneous injection 8 Units  8 Units Subcutaneous BID before breakfast/lunch    melatonin tablet 3 mg  3 mg Oral HS    metoprolol tartrate (LOPRESSOR) tablet 25 mg  25 mg Oral Q12H [facility]    ondansetron (ZOFRAN) injection 4 mg  4 mg Intravenous Q6H PRN    pantoprazole (PROTONIX) EC tablet 40 mg  40 mg Oral Early Morning    polyethylene glycol (MIRALAX) packet 17 g  17 g Oral Daily PRN         VTE Pharmacologic Prophylaxis: Heparin  VTE Mechanical Prophylaxis: sequential compression device    Ronnie Bowens MD

## 2019-01-12 NOTE — PROGRESS NOTES
Progress Note - Cardiothoracic Surgery   Alvy Morning 72 y o  male MRN: [id number]  Unit/Bed#: [facility] 403-01 Encounter: [id number]    24 Hour Events: No events overnight  No complaints this morning  Tolerated HD yesterday     Speech cleared for dysphagia 2 diet with thin liquids    Medications:   Scheduled Meds:  Current Facility-Administered Medications:  acetaminophen 650 mg Oral Q6H PRN Ciera Hinton MD   aspirin 81 mg Oral Daily Ciera Hinton MD   atorvastatin 40 mg Oral Daily With Rosetta Hager MD   b complex-vitamin C-folic acid 1 capsule Oral Daily With Rosetta Hager MD   benzonatate 100 mg Oral TID Ciera Hinton MD   calcium carbonate 500 mg Oral Daily PRN Edita Forman MD   chlorhexidine 15 mL Swish & Spit Q12H [facility] Ashtyn Collado PA-C   docusate sodium 100 mg Oral BID PRN Edita Forman MD   epoetin deepa 2,000 Units Intravenous After Dialysis Ciera Hinton MD   And       epoetin deepa 3,000 Units Intravenous After Dialysis Ciera Hinton MD   furosemide 40 mg Oral BID (diuretic) Ciera Hinton MD   heparin (porcine) 5,000 Units Subcutaneous [facility] CHANELL Selby   insulin glargine 24 Units Subcutaneous HS Kruse Dinning, DO   insulin lispro 1-5 Units Subcutaneous HS Ciera Hinton MD   insulin lispro 1-6 Units Subcutaneous TID AC Ciera Hinton MD   insulin lispro 10 Units Subcutaneous Daily With Dinner Kruse Dinning, DO   insulin lispro 8 Units Subcutaneous BID before breakfast/lunch Kruse Dinning, DO   melatonin 3 mg Oral HS Ciera Hinton MD   metoprolol tartrate 25 mg Oral Q12H [facility] Ciera Hinton MD   ondansetron 4 mg Intravenous Q6H PRN Ciera Hinton MD   pantoprazole 40 mg Oral Early Morning Ciera Hinton MD   polyethylene glycol 17 g Oral Daily PRN Edita Forman MD     Continuous Infusions:     Results:     Results from last 7 days  Lab Units 01/12/19  0446 01/11/19  0455 01/10/19  9147 WBC Thousand/uL 9 48 8 43 9 93   HEMOGLOBIN g/dL 9 7* 9 7* 9 7*   HEMATOCRIT % 31 8* 31 4* 31 5*   PLATELETS Thousands/uL 120* 137* 118*       Results from last 7 days  Lab Units 01/12/19  0446 01/11/19  0455 01/10/19  0442   POTASSIUM mmol/L 4 9 4 9 4 8   CHLORIDE mmol/L 93* 95* 94*   CO2 mmol/L 30 28 30   BUN mg/dL 22 34* 27*   CREATININE mg/dL 3 07* 4 02* 3 51*   CALCIUM mg/dL 8 6 9 2 8 8       Results from last 7 days  Lab Units 01/10/19  0232 01/09/19  1852 01/09/19  1140  01/06/19  1502   INR   --   --   --   --  1 09   PTT seconds 58* 64* 96*  < > 41*   < > = values in this interval not displayed  Studies:   Cardiac Catheterization:   Distal left main: There was a tubular 50 % stenosis  Proximal LAD: There was a tubular 90 % stenosis  2nd diagonal: The vessel was small sized  Angiography showed minor luminal irregularities  Ostial circumflex: The vessel was large sized (dominant)  2nd obtuse marginal: The vessel was small sized  Angiography showed mild atherosclerosis  3rd obtuse marginal: The vessel was small sized  Angiography showed mild atherosclerosis  Left posterior descending artery: There was a 90 % stenosis        Carotid artery duplex:              <50 % Left carotid stenosis with antegrade vertebral flow              <50 % Right carotid stenosis with antegrade vertebral flow     Greater saphenous vein mapping: Adequate conduit for CABG,left leg     EGD: Mild gastritis in the body of the stomach otherwise normal EGD     3D MARQUISE: LEFT VENTRICLE:  Size was normal   Systolic function was markedly reduced  Ejection fraction was estimated to be 30 %  There was severe diffuse hypokinesis with regional variations    No evidence of apical thrombus      RIGHT VENTRICLE:  Systolic function was normal      LEFT ATRIUM:  The atrium was mildly dilated      LEFT ATRIAL APPENDAGE:  No thrombus was identified      ATRIAL SEPTUM:  No defect or patent foramen ovale was identified      MITRAL VALVE:  There was no evidence for stenosis  There was moderate regurgitation  The effective regurgitant orifice area (EROA) by PISA method was 28 mm2, with a regurgitant volume of 39 ml  The regurgitant jet was centrally directed      TRICUSPID VALVE:  There was moderate regurgitation  Estimated peak PA pressure was 50 mmHg  The findings suggest moderate pulmonary hypertension      AORTA:  There was mild atheroma in the proximal descending aorta      PULMONARY VEINS:  There was systolic blunting in the pulmonary vein(s)  Vitals:   Vitals:    01/11/19 2256 01/12/19 0337 01/12/19 0526 01/12/19 0715   BP: 131/67 132/52  126/64   BP Location: Right arm Right arm  Right arm   Pulse: 84 79  84   Resp: 18 16  16   Temp: 98 1 °F (36 7 °C) 98 1 °F (36 7 °C)  98 5 °F (36 9 °C)   TempSrc: Oral Oral  Oral   SpO2: 100% 100%  94%   Weight:   60 4 kg (133 lb 2 5 oz)    Height:           Physical Exam:  HEENT/NECK:  PERRLA  No jugular venous distention  Cardiac: Regular rate and rhythm  Pulmonary:  Breath clear bilaterally  Abdomen:  Non-tender, Non-distended  Positive bowel sounds  Lower extremities: Extremities warm/dry  Neuro: Alert and oriented X 3  Sensation is grossly intact  No focal deficits  Skin: Warm/Dry, without rashes or lesions  Assessment:  Patient Active Problem List   Diagnosis    Shortness of breath    Chest pain    NATALIIA (acute kidney injury) ([facility] [address] )    CKD (chronic kidney disease)    Cardiomyopathy ([facility] [address] )    Hyponatremia    MI, acute, non ST segment elevation ([facility] [address] )    Acute on chronic congestive heart failure (HCC)    Anemia due to chronic kidney disease, on chronic dialysis ([facility] [address] )    Type 2 diabetes mellitus with kidney complication, with long-term current use of insulin (HCC)    Dysphagia    Weight loss    Coronary artery disease involving native coronary artery    Disease of cardiovascular system     Severe coronary artery disease;  Ongoing CABG workup    Plan:  Patient agreeable to proceed with surgery;  Informed consent signed  Continue Mupirocin 2% nasal ointment q 12 hrs  Continue topical chlorhexidine bath and mouth rise  coronary artery bypass grafting scheduled for Monday with NELSY Garcia Pry: Hemanth Sosa [place]  DATE: January 12, 2019  TIME: 9:31 AM

## 2019-01-13 LAB
ANION GAP SERPL CALCULATED.3IONS-SCNC: 3 MMOL/L (ref 4–13)
BASOPHILS # BLD AUTO: 0.03 THOUSANDS/ΜL (ref 0–0.1)
BASOPHILS NFR BLD AUTO: 0 % (ref 0–1)
BUN SERPL-MCNC: 34 MG/DL (ref 5–25)
CALCIUM SERPL-MCNC: 8.9 MG/DL (ref 8.3–10.1)
CHLORIDE SERPL-SCNC: 93 MMOL/L (ref 100–108)
CO2 SERPL-SCNC: 31 MMOL/L (ref 21–32)
CREAT SERPL-MCNC: 3.64 MG/DL (ref 0.6–1.3)
EOSINOPHIL # BLD AUTO: 0.07 THOUSAND/ΜL (ref 0–0.61)
EOSINOPHIL NFR BLD AUTO: 1 % (ref 0–6)
ERYTHROCYTE [DISTWIDTH] IN BLOOD BY AUTOMATED COUNT: 17.3 % (ref 11.6–15.1)
GFR SERPL CREATININE-BSD FRML MDRD: 16 ML/MIN/1.73SQ M
GLUCOSE SERPL-MCNC: 151 MG/DL (ref 65–140)
GLUCOSE SERPL-MCNC: 187 MG/DL (ref 65–140)
GLUCOSE SERPL-MCNC: 188 MG/DL (ref 65–140)
GLUCOSE SERPL-MCNC: 251 MG/DL (ref 65–140)
GLUCOSE SERPL-MCNC: 283 MG/DL (ref 65–140)
HCT VFR BLD AUTO: 29.2 % (ref 36.5–49.3)
HGB BLD-MCNC: 9.1 G/DL (ref 12–17)
IMM GRANULOCYTES # BLD AUTO: 0.05 THOUSAND/UL (ref 0–0.2)
IMM GRANULOCYTES NFR BLD AUTO: 1 % (ref 0–2)
LYMPHOCYTES # BLD AUTO: 1.4 THOUSANDS/ΜL (ref 0.6–4.47)
LYMPHOCYTES NFR BLD AUTO: 16 % (ref 14–44)
MCH RBC QN AUTO: 26 PG (ref 26.8–34.3)
MCHC RBC AUTO-ENTMCNC: 31.2 G/DL (ref 31.4–37.4)
MCV RBC AUTO: 83 FL (ref 82–98)
MONOCYTES # BLD AUTO: 1 THOUSAND/ΜL (ref 0.17–1.22)
MONOCYTES NFR BLD AUTO: 11 % (ref 4–12)
NEUTROPHILS # BLD AUTO: 6.26 THOUSANDS/ΜL (ref 1.85–7.62)
NEUTS SEG NFR BLD AUTO: 71 % (ref 43–75)
NRBC BLD AUTO-RTO: 0 /100 WBCS
PLATELET # BLD AUTO: 139 THOUSANDS/UL (ref 149–390)
PMV BLD AUTO: 12.1 FL (ref 8.9–12.7)
POTASSIUM SERPL-SCNC: 5.4 MMOL/L (ref 3.5–5.3)
RBC # BLD AUTO: 3.5 MILLION/UL (ref 3.88–5.62)
SODIUM SERPL-SCNC: 127 MMOL/L (ref 136–145)
WBC # BLD AUTO: 8.81 THOUSAND/UL (ref 4.31–10.16)

## 2019-01-13 PROCEDURE — 99232 SBSQ HOSP IP/OBS MODERATE 35: CPT | Performed by: INTERNAL MEDICINE

## 2019-01-13 PROCEDURE — 82948 REAGENT STRIP/BLOOD GLUCOSE: CPT

## 2019-01-13 PROCEDURE — 99231 SBSQ HOSP IP/OBS SF/LOW 25: CPT | Performed by: THORACIC SURGERY (CARDIOTHORACIC VASCULAR SURGERY)

## 2019-01-13 PROCEDURE — 85025 COMPLETE CBC W/AUTO DIFF WBC: CPT | Performed by: INTERNAL MEDICINE

## 2019-01-13 PROCEDURE — 80048 BASIC METABOLIC PNL TOTAL CA: CPT | Performed by: INTERNAL MEDICINE

## 2019-01-13 RX ORDER — CEFAZOLIN SODIUM 2 G/50ML
2000 SOLUTION INTRAVENOUS ONCE
Status: CANCELLED | OUTPATIENT
Start: 2019-01-13 | End: 2019-01-13

## 2019-01-13 RX ORDER — FUROSEMIDE 40 MG/1
40 TABLET ORAL
Status: DISCONTINUED | OUTPATIENT
Start: 2019-01-13 | End: 2019-01-16 | Stop reason: HOSPADM

## 2019-01-13 RX ORDER — LACTULOSE 20 G/30ML
10 SOLUTION ORAL DAILY
Status: DISCONTINUED | OUTPATIENT
Start: 2019-01-14 | End: 2019-01-16 | Stop reason: HOSPADM

## 2019-01-13 RX ADMIN — METOPROLOL TARTRATE 25 MG: 25 TABLET, FILM COATED ORAL at 08:13

## 2019-01-13 RX ADMIN — FUROSEMIDE 40 MG: 40 TABLET ORAL at 17:59

## 2019-01-13 RX ADMIN — INSULIN LISPRO 10 UNITS: 100 INJECTION, SOLUTION INTRAVENOUS; SUBCUTANEOUS at 17:00

## 2019-01-13 RX ADMIN — INSULIN LISPRO 1 UNITS: 100 INJECTION, SOLUTION INTRAVENOUS; SUBCUTANEOUS at 06:53

## 2019-01-13 RX ADMIN — HEPARIN SODIUM 5000 UNITS: 5000 INJECTION INTRAVENOUS; SUBCUTANEOUS at 05:10

## 2019-01-13 RX ADMIN — HEPARIN SODIUM 5000 UNITS: 5000 INJECTION INTRAVENOUS; SUBCUTANEOUS at 21:45

## 2019-01-13 RX ADMIN — ATORVASTATIN CALCIUM 40 MG: 40 TABLET, FILM COATED ORAL at 17:59

## 2019-01-13 RX ADMIN — CALCIUM CARBONATE (ANTACID) CHEW TAB 500 MG 500 MG: 500 CHEW TAB at 00:27

## 2019-01-13 RX ADMIN — HEPARIN SODIUM 5000 UNITS: 5000 INJECTION INTRAVENOUS; SUBCUTANEOUS at 17:20

## 2019-01-13 RX ADMIN — BENZONATATE 100 MG: 100 CAPSULE ORAL at 21:45

## 2019-01-13 RX ADMIN — FUROSEMIDE 40 MG: 40 TABLET ORAL at 08:13

## 2019-01-13 RX ADMIN — ACETAMINOPHEN 650 MG: 325 TABLET, FILM COATED ORAL at 00:27

## 2019-01-13 RX ADMIN — MELATONIN 3 MG: at 21:46

## 2019-01-13 RX ADMIN — INSULIN GLARGINE 24 UNITS: 100 INJECTION, SOLUTION SUBCUTANEOUS at 21:45

## 2019-01-13 RX ADMIN — CHLORHEXIDINE GLUCONATE 0.12% ORAL RINSE 15 ML: 1.2 LIQUID ORAL at 21:45

## 2019-01-13 RX ADMIN — INSULIN LISPRO 3 UNITS: 100 INJECTION, SOLUTION INTRAVENOUS; SUBCUTANEOUS at 21:46

## 2019-01-13 RX ADMIN — Medication 1 CAPSULE: at 17:59

## 2019-01-13 RX ADMIN — BENZONATATE 100 MG: 100 CAPSULE ORAL at 17:59

## 2019-01-13 RX ADMIN — ASPIRIN 81 MG 81 MG: 81 TABLET ORAL at 08:13

## 2019-01-13 RX ADMIN — METOPROLOL TARTRATE 25 MG: 25 TABLET, FILM COATED ORAL at 21:46

## 2019-01-13 RX ADMIN — CHLORHEXIDINE GLUCONATE 0.12% ORAL RINSE 15 ML: 1.2 LIQUID ORAL at 08:13

## 2019-01-13 RX ADMIN — FUROSEMIDE 40 MG: 40 TABLET ORAL at 12:00

## 2019-01-13 RX ADMIN — PANTOPRAZOLE SODIUM 40 MG: 40 TABLET, DELAYED RELEASE ORAL at 05:11

## 2019-01-13 RX ADMIN — INSULIN LISPRO 3 UNITS: 100 INJECTION, SOLUTION INTRAVENOUS; SUBCUTANEOUS at 17:00

## 2019-01-13 RX ADMIN — BENZONATATE 100 MG: 100 CAPSULE ORAL at 08:12

## 2019-01-13 NOTE — PROGRESS NOTES
IM Residency Progress Note   Unit/Bed#: [facility] 403-01 Encounter: [id number]  SOD Team A      Cali Turner 72 y o  male [address] [place] Stay Days: 5      Assessment/Plan:    Principal Problem:    MI, acute, non ST segment elevation ([facility] [address] )  Active Problems:    NATALIIA (acute kidney injury) ([facility] [address] )    Acute on chronic congestive heart failure (HCC)    Type 2 diabetes mellitus with kidney complication, with long-term current use of insulin (HCC)    Dysphagia    Weight loss    Coronary artery disease involving native coronary artery    Disease of cardiovascular system    Coronary artery disease  Patient with multivessel disease; CABG Monday     Type 2 diabetes  Hemoglobin A1c of 8 2 on presentation   Appreciate endocrinology recommendations - now on 24 units long-acting, 8 units with breakfast and lunch, 10 units with dinner, sliding scale algorithm 3 for t i d, algorithm 2 for QHS    Acute kidney injury  Baseline creatinine unknown  creatinine of 3 5 today  has been undergoing hemodialysis  - hemodialysis  with volume removal  - hyponatremia to 127 today, hyperkalemia to 5 4 today  - nephrology following    Congestive heart failure  Acute on chronic congestive heart failure with an EF of 35 on recent echo  - MARQUISE demonstrates EF of 30%, diffuse hypokinesis, moderate mitral regurgitation  - consider change to carvedilol from metoprolol tartrate  - Lasix 40 b i d      Dysphagia  Patient complains of dysphagia and associated 30 lb weight loss over the past 6 months      - EGD demonstrated no esophagitis or anatomic obstruction  - appreciate speech therapy recommendations  - dysphagia diet    Disposition: continue inpt for cabg tomorrow       Subjective:   Patient seen and examined  He reports that he is feeling better today  He denies fever or chills  He denies chest pain  He expresses that he still has a decreased appetite         Vitals: Temp (24hrs), Av 1 °F (36 7 °C), Min:97 9 °F (36 6 °C), Max:98 3 °F (36 8 °C)  Current: Temperature: 98 °F (36 7 °C)  Vitals:    01/12/19 2322 01/13/19 0304 01/13/19 0615 01/13/19 0711   BP: 124/63 126/61  139/73   BP Location: Right arm Right arm  Right arm   Pulse: 79 77  81   Resp: 18 18  18   Temp: 97 9 °F (36 6 °C) 98 1 °F (36 7 °C)  98 °F (36 7 °C)   TempSrc: Oral Oral  Oral   SpO2: 97% 100%  100%   Weight:   63 4 kg (139 lb 12 4 oz)    Height:        Body mass index is 23 26 kg/m²  I/O last 24 hours: In: 900 [P O :900]  Out: 300 [Urine:300]      Physical Exam   Constitutional: He is oriented to person, place, and time  He appears well-developed and well-nourished  No distress  HENT:   Head: Normocephalic and atraumatic  Eyes: Conjunctivae and EOM are normal    Cardiovascular: Normal rate, regular rhythm and normal heart sounds  No murmur heard  Pulmonary/Chest: Effort normal and breath sounds normal  He has no wheezes  He has no rales  Abdominal: Soft  Bowel sounds are normal  There is no tenderness  Musculoskeletal: He exhibits no edema or deformity  Neurological: He is alert and oriented to person, place, and time  Skin: Skin is warm and dry  Psychiatric: He has a normal mood and affect   His behavior is normal        Invasive Devices     Peripheral Intravenous Line            Peripheral IV 01/13/19 Right Forearm less than 1 day          Hemodialysis Catheter            Permanent HD Catheter  8 days                          Labs:   Recent Results (from the past 24 hour(s))   Fingerstick Glucose (POCT)    Collection Time: 01/12/19 11:01 AM   Result Value Ref Range    POC Glucose 148 (H) 65 - 140 mg/dl   Fingerstick Glucose (POCT)    Collection Time: 01/12/19  3:09 PM   Result Value Ref Range    POC Glucose 279 (H) 65 - 140 mg/dl   Fingerstick Glucose (POCT)    Collection Time: 01/12/19  8:52 PM   Result Value Ref Range    POC Glucose 288 (H) 65 - 140 mg/dl   Basic metabolic panel    Collection Time: 01/13/19  4:49 AM   Result Value Ref Range    Sodium 127 (L) 136 - 145 mmol/L    Potassium 5 4 (H) 3 5 - 5 3 mmol/L    Chloride 93 (L) 100 - 108 mmol/L    CO2 31 21 - 32 mmol/L    ANION GAP 3 (L) 4 - 13 mmol/L    BUN 34 (H) 5 - 25 mg/dL    Creatinine 3 64 (H) 0 60 - 1 30 mg/dL    Glucose 187 (H) 65 - 140 mg/dL    Calcium 8 9 8 3 - 10 1 mg/dL    eGFR 16 ml/min/1 73sq m   CBC and differential    Collection Time: 01/13/19  4:49 AM   Result Value Ref Range    WBC 8 81 4 31 - 10 16 Thousand/uL    RBC 3 50 (L) 3 88 - 5 62 Million/uL    Hemoglobin 9 1 (L) 12 0 - 17 0 g/dL    Hematocrit 29 2 (L) 36 5 - 49 3 %    MCV 83 82 - 98 fL    MCH 26 0 (L) 26 8 - 34 3 pg    MCHC 31 2 (L) 31 4 - 37 4 g/dL    RDW 17 3 (H) 11 6 - 15 1 %    MPV 12 1 8 9 - 12 7 fL    Platelets 192 (L) 411 - 390 Thousands/uL    nRBC 0 /100 WBCs    Neutrophils Relative 71 43 - 75 %    Immat GRANS % 1 0 - 2 %    Lymphocytes Relative 16 14 - 44 %    Monocytes Relative 11 4 - 12 %    Eosinophils Relative 1 0 - 6 %    Basophils Relative 0 0 - 1 %    Neutrophils Absolute 6 26 1 85 - 7 62 Thousands/µL    Immature Grans Absolute 0 05 0 00 - 0 20 Thousand/uL    Lymphocytes Absolute 1 40 0 60 - 4 47 Thousands/µL    Monocytes Absolute 1 00 0 17 - 1 22 Thousand/µL    Eosinophils Absolute 0 07 0 00 - 0 61 Thousand/µL    Basophils Absolute 0 03 0 00 - 0 10 Thousands/µL   Fingerstick Glucose (POCT)    Collection Time: 01/13/19  5:57 AM   Result Value Ref Range    POC Glucose 188 (H) 65 - 140 mg/dl       Radiology Results: I have personally reviewed pertinent reports  Other Diagnostic Testing:   I have personally reviewed pertinent reports          Active Meds:   Current Facility-Administered Medications   Medication Dose Route Frequency    acetaminophen (TYLENOL) tablet 650 mg  650 mg Oral Q6H PRN    aspirin chewable tablet 81 mg  81 mg Oral Daily    atorvastatin (LIPITOR) tablet 40 mg  40 mg Oral Daily With Dinner    b complex-vitamin C-folic acid (NEPHROCAPS) capsule 1 capsule  1 capsule Oral Daily With Dinner    benzonatate (TESSALON PERLES) capsule 100 mg  100 mg Oral TID    calcium carbonate (TUMS) chewable tablet 500 mg  500 mg Oral Daily PRN    chlorhexidine (PERIDEX) 0 12 % oral rinse 15 mL  15 mL Swish & Spit Q12H [facility]    docusate sodium (COLACE) capsule 100 mg  100 mg Oral BID PRN    epoetin deepa (EPOGEN,PROCRIT) injection 2,000 Units  2,000 Units Intravenous After Dialysis    And    epoetin deepa (EPOGEN,PROCRIT) injection 3,000 Units  3,000 Units Intravenous After Dialysis    furosemide (LASIX) tablet 40 mg  40 mg Oral BID (diuretic)    heparin (porcine) subcutaneous injection 5,000 Units  5,000 Units Subcutaneous Q8H [facility]    insulin glargine (LANTUS) subcutaneous injection 24 Units 0 24 mL  24 Units Subcutaneous HS    insulin lispro (HumaLOG) 100 units/mL subcutaneous injection 1-5 Units  1-5 Units Subcutaneous HS    insulin lispro (HumaLOG) 100 units/mL subcutaneous injection 1-6 Units  1-6 Units Subcutaneous TID AC    insulin lispro (HumaLOG) 100 units/mL subcutaneous injection 10 Units  10 Units Subcutaneous Daily With Dinner    insulin lispro (HumaLOG) 100 units/mL subcutaneous injection 8 Units  8 Units Subcutaneous BID before breakfast/lunch    melatonin tablet 3 mg  3 mg Oral HS    metoprolol tartrate (LOPRESSOR) tablet 25 mg  25 mg Oral Q12H [facility]    ondansetron (ZOFRAN) injection 4 mg  4 mg Intravenous Q6H PRN    pantoprazole (PROTONIX) EC tablet 40 mg  40 mg Oral Early Morning    polyethylene glycol (MIRALAX) packet 17 g  17 g Oral Daily PRN         VTE Pharmacologic Prophylaxis: Heparin  VTE Mechanical Prophylaxis: sequential compression device    Rajwinder Rene DO

## 2019-01-13 NOTE — OCCUPATIONAL THERAPY NOTE
Occupational Therapy Cancellation Note    Orders received and chart reviewed  Pt plan for CABG tomorrow  Will D/C acute care OT orders at this time  Please re-consult post-op w/ updated orders  Will continue to follow to be seen for OT evaluation as appropriate/when medically cleared         Boaz Fox MS, OTR/L

## 2019-01-13 NOTE — PROGRESS NOTES
Progress Note - Cardiothoracic Surgery   Alondra Matos 72 y o  male MRN: [id number]  Unit/Bed#: [facility] 403-01 Encounter: [id number]    24 Hour Events: No complaints this morning  Denies any chest pain or SOB  Poor activity, not ambulatory      Medications:   Scheduled Meds:  Current Facility-Administered Medications:  acetaminophen 650 mg Oral Q6H PRN Sahil Dan MD   aspirin 81 mg Oral Daily Sahil Dan MD   atorvastatin 40 mg Oral Daily With Luis Mitchell MD   b complex-vitamin C-folic acid 1 capsule Oral Daily With Luis Mitchell MD   benzonatate 100 mg Oral TID Sahil Dan MD   calcium carbonate 500 mg Oral Daily PRN Edita Forman MD   chlorhexidine 15 mL Swish & Spit Q12H [facility] HOME Landon Gardner PA-C   docusate sodium 100 mg Oral BID PRN Edita Forman MD   epoetin deepa 2,000 Units Intravenous After Dialysis Sahil Dan MD   And       epoetin deepa 3,000 Units Intravenous After Dialysis Sahil Dan MD   furosemide 40 mg Oral TID (diuretic) Quinton Sheridan DO   heparin (porcine) 5,000 Units Subcutaneous [facility] CHANELL Selby   insulin glargine 24 Units Subcutaneous HS Garett Daugherty,    insulin lispro 1-5 Units Subcutaneous HS Sahil Dan MD   insulin lispro 1-6 Units Subcutaneous TID AC Sahil Dan MD   insulin lispro 10 Units Subcutaneous Daily With Dinner Garett Daugherty DO   insulin lispro 8 Units Subcutaneous BID before breakfast/lunch Garett Daugherty DO   melatonin 3 mg Oral HS Sahil Dan MD   metoprolol tartrate 25 mg Oral Q12H [facility] HOME Sahil Dan MD   ondansetron 4 mg Intravenous Q6H PRN Sahil Dan MD   pantoprazole 40 mg Oral Early Morning Sahil Dan MD   polyethylene glycol 17 g Oral Daily PRN Chhaya Ball MD     Continuous Infusions:     Results:     Results from last 7 days  Lab Units 01/13/19  0449 01/12/19  0446 01/11/19  0455   WBC Thousand/uL 8 81 9 48 8 43   HEMOGLOBIN g/dL 9 1* 9 7* 9 7*   HEMATOCRIT % 29 2* 31 8* 31 4*   PLATELETS Thousands/uL 139* 120* 137*       Results from last 7 days  Lab Units 01/13/19  0449 01/12/19  0446 01/11/19  0455   POTASSIUM mmol/L 5 4* 4 9 4 9   CHLORIDE mmol/L 93* 93* 95*   CO2 mmol/L 31 30 28   BUN mg/dL 34* 22 34*   CREATININE mg/dL 3 64* 3 07* 4 02*   CALCIUM mg/dL 8 9 8 6 9 2       Results from last 7 days  Lab Units 01/10/19  0232 01/09/19  1852 01/09/19  1140  01/06/19  1502   INR   --   --   --   --  1 09   PTT seconds 58* 64* 96*  < > 41*   < > = values in this interval not displayed  Studies:   Cardiac Catheterization:   Distal left main: There was a tubular 50 % stenosis  Proximal LAD: There was a tubular 90 % stenosis  2nd diagonal: The vessel was small sized  Angiography showed minor luminal irregularities  Ostial circumflex: The vessel was large sized (dominant)  2nd obtuse marginal: The vessel was small sized  Angiography showed mild atherosclerosis  3rd obtuse marginal: The vessel was small sized  Angiography showed mild atherosclerosis  Left posterior descending artery: There was a 90 % stenosis        Carotid artery duplex:              <50 % Left carotid stenosis with antegrade vertebral flow              <50 % Right carotid stenosis with antegrade vertebral flow     Greater saphenous vein mapping: Adequate conduit for CABG,left leg     EGD: Mild gastritis in the body of the stomach otherwise normal EGD     3D MARQUISE: LEFT VENTRICLE:  Size was normal   Systolic function was markedly reduced  Ejection fraction was estimated to be 30 %  There was severe diffuse hypokinesis with regional variations  No evidence of apical thrombus      RIGHT VENTRICLE:  Systolic function was normal      LEFT ATRIUM:  The atrium was mildly dilated      LEFT ATRIAL APPENDAGE:  No thrombus was identified      ATRIAL SEPTUM:  No defect or patent foramen ovale was identified      MITRAL VALVE:  There was no evidence for stenosis    There was moderate regurgitation  The effective regurgitant orifice area (EROA) by PISA method was 28 mm2, with a regurgitant volume of 39 ml  The regurgitant jet was centrally directed      TRICUSPID VALVE:  There was moderate regurgitation  Estimated peak PA pressure was 50 mmHg  The findings suggest moderate pulmonary hypertension      AORTA:  There was mild atheroma in the proximal descending aorta      PULMONARY VEINS:  There was systolic blunting in the pulmonary vein(s)  Vitals:   Vitals:    01/12/19 2322 01/13/19 0304 01/13/19 0615 01/13/19 0711   BP: 124/63 126/61  139/73   BP Location: Right arm Right arm  Right arm   Pulse: 79 77  81   Resp: 18 18  18   Temp: 97 9 °F (36 6 °C) 98 1 °F (36 7 °C)  98 °F (36 7 °C)   TempSrc: Oral Oral  Oral   SpO2: 97% 100%  100%   Weight:   63 4 kg (139 lb 12 4 oz)    Height:           Physical Exam:  HEENT/NECK:  PERRLA  No jugular venous distention  Cardiac: Regular rate and rhythm  Pulmonary:  Breath clear bilaterally  Abdomen:  Non-tender, Non-distended  Positive bowel sounds  Lower extremities: Extremities warm/dry  Neuro: Alert and oriented X 3  Sensation is grossly intact  No focal deficits  Skin: Warm/Dry, without rashes or lesions  Assessment:  Patient Active Problem List   Diagnosis    Shortness of breath    Chest pain    NATAL[facility]A [facility]) [facility] [address] [facility]    CKD (chronic kidney disease)    Cardiomy[facility]    Hyponatremia    MI, acute, non ST segment elevation ([facility] [place]    Acute on chronic congestive heart failure (HCC)    Anemia due to chronic kidney disease, on chronic dialysis ([facility] [place] )    Type 2 diabetes mellitus with kidney complication, with long-term current use of insulin (HCC)    Dysphagia    Weight loss    Coronary artery disease involving native coronary artery    Disease of cardiovascular system     Severe coronary artery disease; Ongoing CABG workup    Plan:  Poor activity and mobility  Patient refusing ambulation     Surgery cancelled, will need to discuss with cardiology for PCI options      SIGNATURE: [facility]  DATE: January 13, 2019  TIME: 9:19 AM

## 2019-01-13 NOTE — PROGRESS NOTES
General Cardiology Progress Note   Jennifer Garcia 72 y o  male MRN: [id number]  Unit/Bed#: [facility] 403-01 Encounter: [id number]      Assessment:  Principal Problem:    MI, acute, non ST segment elevation (HCC)  Active Problems:    NATALIIA (acute kidney injury) ([address] )    Acute on chronic congestive heart failure (HCC)    Type 2 diabetes mellitus with kidney complication, with long-term current use of insulin (HCC)    Dysphagia    Weight loss    Coronary artery disease involving native coronary artery    Disease of cardiovascular system      Impression:    CAD  Left main and ostial LAD disease  Severe ischemic cardiomyopathy  Dyspnea on exertion is primary symptom  Remains euvolemic    Acute on chronic systolic CHF  Volume managed by hemodialysis    Acute kidney injury on CKD resulting in need for hemodialysis    Chronic debility  Weight loss    Plan:    Considering refusing PT, walking, food, and combined with multiple coomorbidities/frailty, weightloss, he has now been deemed inappropriate for CABG    Interventional cardiology consult Monday for LM/high risk stenting  Fluid mgmt by HD    Subjective:   Patient seen and examined  No CP, SOB with exertion noted  Per nursing, refusing food, to walk with PT   Just lays in bed    Objective   Vitals: Blood pressure 139/73, pulse 81, temperature 98 °F (36 7 °C), temperature source Oral, resp  rate 18, height 5' 5" (1 651 m), weight 63 4 kg (139 lb 12 4 oz), SpO2 100 %  , Body mass index is 23 26 kg/m² , I/O last 3 completed shifts: In: 1100 [P O :1100]  Out: 700 [Urine:700]  I/O this shift:  In: 220 [P O :220]  Out: -   Wt Readings from Last 3 Encounters:   01/13/19 63 4 kg (139 lb 12 4 oz)   01/08/19 67 kg (147 lb 11 3 oz)       Intake/Output Summary (Last 24 hours) at 01/13/19 1310  Last data filed at 01/13/19 1302   Gross per 24 hour   Intake              460 ml   Output              300 ml   Net              160 ml     I/O last 3 completed shifts:   In: 1100 [P O :1100]  Out: 700 [Urine:700]    Tele stable, NSR    Physical Exam:    GEN: Aspen Gaster appears lethargic, weak, frail  NECK: no JVD  HEART: rrr, no murmur  LUNGS: CTAB, no rales  ABDOMEN: soft, nt  EXTREMITIES: no cyanosis, no edema      Meds/Allergies   No Known Allergies    Current Facility-Administered Medications:     acetaminophen (TYLENOL) tablet 650 mg, 650 mg, Oral, Q6H PRN, Ana Fritz MD, 650 mg at 01/13/19 0027    aspirin chewable tablet 81 mg, 81 mg, Oral, Daily, Ana Fritz MD, 81 mg at 01/13/19 0813    atorvastatin (LIPITOR) tablet 40 mg, 40 mg, Oral, Daily With Amanda Cortez MD, 40 mg at 01/12/19 1711    b complex-vitamin C-folic acid (NEPHROCAPS) capsule 1 capsule, 1 capsule, Oral, Daily With Amanda Cortez MD, 1 capsule at 01/12/19 1711    benzonatate (TESSALON PERLES) capsule 100 mg, 100 mg, Oral, TID, Ana Fritz MD, 100 mg at 01/13/19 6522    calcium carbonate (TUMS) chewable tablet 500 mg, 500 mg, Oral, Daily PRN, Edita Forman MD, 500 mg at 01/13/19 0027    chlorhexidine (PERIDEX) 0 12 % oral rinse 15 mL, 15 mL, Swish & Spit, Q12H [facility], Ismael Rodriguez PA-C, 15 mL at 01/13/19 0813    docusate sodium (COLACE) capsule 100 mg, 100 mg, Oral, BID PRN, Edita Forman MD, 100 mg at 01/12/19 0201    epoetin deepa (EPOGEN,PROCRIT) injection 2,000 Units, 2,000 Units, Intravenous, After Dialysis, 2,000 Units at 01/11/19 0810 **AND** epoetin deepa (EPOGEN,PROCRIT) injection 3,000 Units, 3,000 Units, Intravenous, After Dialysis, Ana Fritz MD, 3,000 Units at 01/11/19 0810    furosemide (LASIX) tablet 40 mg, 40 mg, Oral, TID (diuretic), Chalice Cork, DO    heparin (porcine) subcutaneous injection 5,000 Units, 5,000 Units, Subcutaneous, Q8H [facility], [facility] Toshia Curtis PA-C, 5,000 Units at 01/13/19 0510    insulin glargine (LANTUS) subcutaneous injection 24 Units 0 24 mL, 24 Units, Subcutaneous, HS, Mc Hua DO, 24 Units at 01/12/19 2145    insulin lispro (HumaLOG) 100 units/mL subcutaneous injection 1-5 Units, 1-5 Units, Subcutaneous, HS, Jamaal Ann MD, 3 Units at 01/12/19 2146    insulin lispro (HumaLOG) 100 units/mL subcutaneous injection 1-6 Units, 1-6 Units, Subcutaneous, TID AC, 1 Units at 01/13/19 0653 **AND** Fingerstick Glucose (POCT), , , TID AC, Jamaal Ann MD    insulin lispro (HumaLOG) 100 units/mL subcutaneous injection 10 Units, 10 Units, Subcutaneous, Daily With [employer]	[place], DO, 10 Units at 01/12/19 1712    insulin lispro (HumaLOG) 100 units/mL subcutaneous injection 8 Units, 8 Units, Subcutaneous, BID before breakfast/lunch, Yina AmanDO olesya, 8 Units at 01/13/19 0653    melatonin tablet 3 mg, 3 mg, Oral, HS, Jamaal Ann MD, 3 mg at 01/12/19 2145    [START ON 1/14/2019] metoprolol tartrate (LOPRESSOR) partial tablet 12 5 mg, 12 5 mg, Oral, Once, Nelida Prieto PA-C    metoprolol tartrate (LOPRESSOR) tablet 25 mg, 25 mg, Oral, Q12H [facility], Jamaal Ann MD, 25 mg at 01/13/19 0813    ondansetron (ZOFRAN) injection 4 mg, 4 mg, Intravenous, Q6H PRN, Jamaal Ann MD    pantoprazole (PROTONIX) EC tablet 40 mg, 40 mg, Oral, Early Morning, Jamaal Ann MD, 40 mg at 01/13/19 0511    polyethylene glycol (MIRALAX) packet 17 g, 17 g, Oral, Daily PRN, Pauly Bazan MD    Laboratory Results:    Results from last 7 days  Lab Units 01/06/19  2347 01/06/19 2029 01/06/19  1721   TROPONIN I ng/mL 0 28* 0 30* 0 33*       CBC with diff:     Results from last 7 days  Lab Units 01/13/19  0449 01/12/19  0446 01/11/19  0455 01/10/19  0442 01/09/19  1400 01/09/19  0356 01/08/19  1954 01/08/19  0449   WBC Thousand/uL 8 81 9 48 8 43 9 93 10 13 9 61 9 64 8 74   HEMOGLOBIN g/dL 9 1* 9 7* 9 7* 9 7* 9 2* 9 6* 10 0* 10 0*   HEMATOCRIT % 29 2* 31 8* 31 4* 31 5* 29 1* 30 7* 32 6* 32 0*   MCV fL 83 85 85 84 82 83 84 83   PLATELETS Thousands/uL 139* 120* 137* 118* 138* 125* 132* 104*   MCH pg 26 0* 25 8* 26 1* 25 9* 26 1* 26 1* 25 6* 26 0*   MCHC g/dL 31 2* 30 5* 30 9* 30 8* 31 6 31 3* 30 7* 31 3*   RDW % 17 3* 17 3* 17 1* 16 7* 16 5* 16 3* 16 4* 16 6*   MPV fL 12 1 12 1 12 4 12 5 12 2 11 5 12 0 10 8   NRBC AUTO /100 WBCs 0 0 0 0 0 0  --  0       CMP:    Results from last 7 days  Lab Units 01/13/19  0449 01/12/19  0446 01/11/19  0455 01/10/19  0442 01/09/19  1400 01/09/19  0355 01/08/19 1954   POTASSIUM mmol/L 5 4* 4 9 4 9 4 8 4 4 4 5 4 8   CHLORIDE mmol/L 93* 93* 95* 94* 87* 88* 85*   CO2 mmol/L 31 30 28 30 33* 33* 36*   BUN mg/dL 34* 22 34* 27* 40* 37* 36*   CREATININE mg/dL 3 64* 3 07* 4 02* 3 51* 4 48* 4 19* 4 03*   CALCIUM mg/dL 8 9 8 6 9 2 8 8 9 1 9 0 9 0   EGFR ml/min/1 73sq m 16 19 14 17 12 13 14       BMP:    Results from last 7 days  Lab Units 01/13/19  0449 01/12/19  0446 01/11/19  0455 01/10/19  0442 01/09/19  1400 01/09/19  0355 01/08/19 1954   POTASSIUM mmol/L 5 4* 4 9 4 9 4 8 4 4 4 5 4 8   CHLORIDE mmol/L 93* 93* 95* 94* 87* 88* 85*   CO2 mmol/L 31 30 28 30 33* 33* 36*   BUN mg/dL 34* 22 34* 27* 40* 37* 36*   CREATININE mg/dL 3 64* 3 07* 4 02* 3 51* 4 48* 4 19* 4 03*   CALCIUM mg/dL 8 9 8 6 9 2 8 8 9 1 9 0 9 0       NT-proBNP: No results for input(s): NTBNP in the last 72 hours  Magnesium:     Results from last 7 days  Lab Units 01/10/19  0442   MAGNESIUM mg/dL 2 3       Coags:   Results from last 7 days  Lab Units 01/10/19  0232 01/09/19  1852 01/09/19  1140 01/09/19  0355 01/08/19  1953 01/08/19  1250 01/08/19  0449  01/06/19  1502   PTT seconds 58* 64* 96* 44* 59* 55* 95*  < > 41*   INR   --   --   --   --   --   --   --   --  1 09   < > = values in this interval not displayed      TSH:        Hemoglobin A1C )      Lipid Profile:   No results found for: CHOL  Lab Results   Component Value Date    HDL 48 01/10/2019     Lab Results   Component Value Date    LDLCALC 58 01/10/2019     No results found for: LDLDIRECT  Lab Results   Component Value Date    TRIG 65 01/10/2019       Cardiac testing:   MARQUISE revealed moderate MR, moderate TR, EF 30%    Results for orders placed during the hospital encounter of 18   Cardiac catheterization    Narrative [address] [place] [address] [phone]    [facility]    Invasive Cardiovascular Lab Complete Report    Patient: Jackie Perkins  MR number: [id number]  Account number: [de-identified]  Study date: 2019  Gender: Male  : 1945  Height: 65 in  Weight: 165 lb  BSA: 1 82 m squared    Allergies: NO KNOWN ALLERGIES    Interventional Cardiologist:  Marquita Voss MD    SUMMARY    CORONARY CIRCULATION:  Distal left main: There was a tubular 50 % stenosis  Proximal LAD: There was a tubular 90 % stenosis  2nd diagonal: The vessel was small sized  Angiography showed minor luminal irregularities  Ostial circumflex: The vessel was large sized (dominant)  2nd obtuse marginal: The vessel was small sized  Angiography showed mild atherosclerosis  3rd obtuse marginal: The vessel was small sized  Angiography showed mild atherosclerosis  Left posterior descending artery: There was a 90 % stenosis  HEMODYNAMICS:  Hemodynamic assessment demonstrated mildly to moderately elevated LVEDP  RECOMMENDATIONS:  Consultation with a cardiac surgeon should be obtained for coronary artery bypass grafting  INDICATIONS:  --  Possible CAD: myocardial infarction without ST elevation (NSTEMI)  --  Congestive heart failure with cardiomyopathy  --  Cardiac: dyspnea and chest pain  PROCEDURES PERFORMED    --  Left heart catheterization without ventriculogram   --  Left coronary angiography  --  Right coronary angiography  --  Inpatient  --  Coronary Catheterization (w/ LHC)  --  Mod Sedation Same Physician Initial 15min  --  Mod Sedation Same Physician Add 15min      PROCEDURE: The risks and alternatives of the procedures and conscious sedation were explained to the patient and informed consent was obtained  The patient was brought to the cath lab and placed on the table  The planned puncture sites  were prepped and draped in the usual sterile fashion  --  Right femoral artery access  The puncture site was infiltrated with local anesthetic  The vessel was accessed using the modified Seldinger technique, a wire was advanced into the vessel, and a sheath was advanced over the wire into the  vessel  --  Left heart catheterization without ventriculogram  A catheter was advanced over a guidewire into the ascending aorta  After recording ascending aortic pressure, the catheter was advanced across the aortic valve and left ventricular  pressure was recorded  The catheter was pulled back across the aortic valve and into the ascending aorta and pullback pressures were obtained  --  Left coronary artery angiography  A catheter was advanced over a guidewire into the aorta and positioned in the left coronary artery ostium under fluoroscopic guidance  Angiography was performed  --  Right coronary artery angiography  A catheter was advanced over a guidewire into the aorta and positioned in the right coronary artery ostium under fluoroscopic guidance  Angiography was performed  --  Inpatient  --  Coronary Catheterization (w/ LHC)  --  Mod Sedation Same Physician Initial 15min  --  Mod Sedation Same Physician Add 15min  PROCEDURE COMPLETION: The patient tolerated the procedure well and was discharged from the cath lab  TIMING: Test started at 08:27  Test concluded at 08:48  HEMOSTASIS: The sheath was removed  The site was compressed manually  Hemostasis  was obtained  MEDICATIONS GIVEN: Versed (2mg/2ml), 1 mg, IV, at 08:20  Fentanyl (1OOmcg/2 ml), 50 mcg, IV, at 08:20  1% Lidocaine, 9 ml, subcutaneously, at 08:28  CONTRAST GIVEN: 40 ml Visipaque-(320mg I /ml)  RADIATION EXPOSURE:  Fluoroscopy time: 1 85 min      HEMODYNAMICS: Hemodynamic assessment demonstrated mildly to moderately elevated LVEDP  VALVES:  AORTIC VALVE:   --  There was no aortic stenosis  CORONARY VESSELS:   --  Distal left main: There was a tubular 50 % stenosis  --  LAD: The vessel was normal sized  --  Proximal LAD: There was a tubular 90 % stenosis  --  1st diagonal: The vessel was small sized  Angiography showed minor luminal irregularities  --  2nd diagonal: The vessel was small sized  Angiography showed minor luminal irregularities  --  Circumflex: The vessel was normal sized  Angiography showed mild atherosclerosis  --  Ostial circumflex: The vessel was large sized (dominant)  There was a 30 % stenosis  --  1st obtuse marginal: The vessel was small sized  Angiography showed severe atherosclerosis  --  2nd obtuse marginal: The vessel was small sized  Angiography showed mild atherosclerosis  --  3rd obtuse marginal: The vessel was small sized  Angiography showed mild atherosclerosis  --  Left posterior descending artery: There was a 90 % stenosis  --  RCA: The vessel was small (non-dominant)  Angiography showed moderate atherosclerosis  RECOMMENDATIONS  Consultation with a cardiac surgeon should be obtained for coronary artery bypass grafting  DISPOSITION:  The patient left the catheterization laboratory in stable condition  Prepared and signed by    Marco Antonio Rome MD  Signed 01/07/2019 10:29:53    Study diagram    Angiographic findings  Native coronary lesions:  ·Distal left main: Lesion 1: tubular, 50 % stenosis  ·Proximal LAD: Lesion 1: tubular, 90 % stenosis  ·Ostial circumflex: Lesion 1: 30 % stenosis  ·LPDA: Lesion 1: 90 % stenosis   [Vessel not pictured]    Hemodynamic tables    Pressures:  Baseline  Pressures:  - HR: 85  Pressures:  - Rhythm:  Pressures:  -- Aortic Pressure (S/D/M): 142/61/86  Pressures:  -- Left Ventricle (s/edp): 144/28/--    Outputs:  Baseline  Outputs:  -- CALCULATIONS[age]  Outputs:  -- CALCULATIONS: Body Surface Area: 1 82  Outputs: -- CALCULATIONS: Height in cm: 165 00  Outputs:  -- CALCULATIONS: Sex: Male  Outputs:  -- CALCULATIONS: Weight in k 00       No results found for this or any previous visit  No results found for this or any previous visit  No results found for this or any previous visit  Abhay Britt MD    Portions of the record may have been created with voice recognition software   Occasional wrong word or "sound a like" substitutions may have occurred due to the inherent limitations of voice recognition software   Read the chart carefully and recognize, using context, where substitutions have occurred

## 2019-01-13 NOTE — PROGRESS NOTES
NEPHROLOGY PROGRESS NOTE   Susan Busch 72 y o  male MRN: [id number]  Unit/Bed#: [facility] 403-01 Encounter: [id number]      ASSESSMENT & PLAN:    44-year-old with a past medical history ischemic cardiomyopathy with an EF 35%, hypertension, hyperlipidemia stage 4 chronic kidney disease that has progressed to end-stage kidney disease here for evaluation of a CABG    1  Acute kidney injury that has progressed end-stage kidney disease  2  Systolic congestive heart failure  3  Hyponatremia  4  Hypertension  5  Anemia of chronic kidney disease  6  CKD bone mineral disease  7  Multi-vessel Coronary artery disease  8  Hyperkalemia     -CABG tentatively planned for Monday  -states overall he is feeling better appetite has been better  -tolerating fluid removal on dialysis  -labs reviewed today potassium was slightly elevated at 5 4, need to be evaluated prior to OR for potential HD treatment prior to surgery if potassium is elevated-placed on a low-potassium diet and will increase furosemide to 40 mg IV t i d    For kaluresis  -24 hr creatinine clearance scheduled for today  -will continue volume removal on dialysis  -sodium remains on the lower side corrected for glucose is likely around 130 hopefully with ultrafiltration and sodium will improve as well-lower sodium bath on HD to prevent large fluctuations    SUBJECTIVE:    Patient ready for surgery tomorrow currently okay denies any fevers or chills or any acute events overnight     OBJECTIVE:  Current Weight: Weight - Scale: 63 4 kg (139 lb 12 4 oz)  Vitals:    01/13/19 0711   BP: 139/73   Pulse: 81   Resp: 18   Temp: 98 °F (36 7 °C)   SpO2: 100%       Intake/Output Summary (Last 24 hours) at 01/13/19 2250  Last data filed at 01/13/19 2217   Gross per 24 hour   Intake              600 ml   Output              300 ml   Net              300 ml       General: conscious, cooperative, in not acute distress  Eyes: conjunctivae pink, anicteric sclerae  ENT: lips and mucous membranes moist  Neck: supple, no JVD  Chest:  Decreased breath sounds at the bases  CVS: distinct S1 & S2, normal rate, regular rhythm  Abdomen: soft, non-tender, non-distended, normoactive bowel sounds  Extremities: no edema of both legs  Skin: no rash  Neuro: awake, alert, oriented    Medications:    Current Facility-Administered Medications:     acetaminophen (TYLENOL) tablet 650 mg, 650 mg, Oral, Q6H PRN, Sandra Herring MD, 650 mg at 01/13/19 0027    aspirin chewable tablet 81 mg, 81 mg, Oral, Daily, Sandra Herring MD, 81 mg at 01/13/19 0813    atorvastatin (LIPITOR) tablet 40 mg, 40 mg, Oral, Daily With Robert Montoya MD, 40 mg at 01/12/19 1711    b complex-vitamin C-folic acid (NEPHROCAPS) capsule 1 capsule, 1 capsule, Oral, Daily With Robert Montoya MD, 1 capsule at 01/12/19 1711    benzonatate (TESSALON PERLES) capsule 100 mg, 100 mg, Oral, TID, Sandra Herring MD, 100 mg at 01/13/19 8274    calcium carbonate (TUMS) chewable tablet 500 mg, 500 mg, Oral, Daily PRN, Edita Forman MD, 500 mg at 01/13/19 0027    chlorhexidine (PERIDEX) 0 12 % oral rinse 15 mL, 15 mL, Swish & Spit, Q12H [address], Viri Avila PA-C, 15 mL at 01/13/19 0813    docusate sodium (COLACE) capsule 100 mg, 100 mg, Oral, BID PRN, Edita Forman MD, 100 mg at 01/12/19 0201    epoetin deepa (EPOGEN,PROCRIT) injection 2,000 Units, 2,000 Units, Intravenous, After Dialysis, 2,000 Units at 01/11/19 0810 **AND** epoetin deepa (EPOGEN,PROCRIT) injection 3,000 Units, 3,000 Units, Intravenous, After Dialysis, Sandra Herring MD, 3,000 Units at 01/11/19 0810    furosemide (LASIX) tablet 40 mg, 40 mg, Oral, BID (diuretic), Sandra Herring MD, 40 mg at 01/13/19 0813    heparin (porcine) subcutaneous injection 5,000 Units, 5,000 Units, Subcutaneous, Q8H [address], CHANELL Selby, 5,000 Units at 01/13/19 0510    insulin glargine (LANTUS) subcutaneous injection 24 Units 0 24 mL, 24 Units, Subcutaneous, HS, Melody William DO, 24 Units at 01/12/19 2145    insulin lispro (HumaLOG) 100 units/mL subcutaneous injection 1-5 Units, 1-5 Units, Subcutaneous, HS, Montrell Gleason MD, 3 Units at 01/12/19 2146    insulin lispro (HumaLOG) 100 units/mL subcutaneous injection 1-6 Units, 1-6 Units, Subcutaneous, TID AC, 1 Units at 01/13/19 0653 **AND** Fingerstick Glucose (POCT), , , TID AC, Montrell Gleason MD    insulin lispro (HumaLOG) 100 units/mL subcutaneous injection 10 Units, 10 Units, Subcutaneous, Daily With [place] , DO, 10 Units at 01/12/19 1712    insulin lispro (HumaLOG) 100 units/mL subcutaneous injection 8 Units, 8 Units, Subcutaneous, BID before breakfast/lunch, Melody William DO, 8 Units at 01/13/19 0653    melatonin tablet 3 mg, 3 mg, Oral, HS, Montrell Gleason MD, 3 mg at 01/12/19 2145    metoprolol tartrate (LOPRESSOR) tablet 25 mg, 25 mg, Oral, Q12H [facility], Montrell Gleason MD, 25 mg at 01/13/19 0813    ondansetron (ZOFRAN) injection 4 mg, 4 mg, Intravenous, Q6H PRN, Montrell Gleason MD    pantoprazole (PROTONIX) EC tablet 40 mg, 40 mg, Oral, Early Morning, Montrell Gleason MD, 40 mg at 01/13/19 0511    polyethylene glycol (MIRALAX) packet 17 g, 17 g, Oral, Daily PRN, Lon Barajas MD       Lab Results:     Results from last 7 days  Lab Units 01/13/19  0449 01/12/19  0446 01/11/19  0455 01/10/19  0442 01/09/19  1400   WBC Thousand/uL 8 81 9 48 8 43 9 93 10 13   HEMOGLOBIN g/dL 9 1* 9 7* 9 7* 9 7* 9 2*   HEMATOCRIT % 29 2* 31 8* 31 4* 31 5* 29 1*   PLATELETS Thousands/uL 139* 120* 137* 118* 138*   POTASSIUM mmol/L 5 4* 4 9 4 9 4 8 4 4   CHLORIDE mmol/L 93* 93* 95* 94* 87*   CO2 mmol/L 31 30 28 30 33*   BUN mg/dL 34* 22 34* 27* 40*   CREATININE mg/dL 3 64* 3 07* 4 02* 3 51* 4 48*   CALCIUM mg/dL 8 9 8 6 9 2 8 8 9 1   MAGNESIUM mg/dL  --   --   --  2 3  --    PHOSPHORUS mg/dL  --   --   --  2 2*  --        Previous work up:  Please see previous notes

## 2019-01-13 NOTE — PROGRESS NOTES
Patient resting comfortably in bed  VSS with no c/o of pain  Morning medications given  Bed in low and locked position  Call bell within reach

## 2019-01-14 ENCOUNTER — APPOINTMENT (INPATIENT)
Dept: DIALYSIS | Facility: HOSPITAL | Age: 74
DRG: 280 | End: 2019-01-14
Attending: INTERNAL MEDICINE
Payer: MEDICARE

## 2019-01-14 LAB
ANION GAP SERPL CALCULATED.3IONS-SCNC: 4 MMOL/L (ref 4–13)
BASOPHILS # BLD AUTO: 0.03 THOUSANDS/ΜL (ref 0–0.1)
BASOPHILS NFR BLD AUTO: 0 % (ref 0–1)
BUN SERPL-MCNC: 44 MG/DL (ref 5–25)
CALCIUM SERPL-MCNC: 8.9 MG/DL (ref 8.3–10.1)
CHLORIDE SERPL-SCNC: 90 MMOL/L (ref 100–108)
CO2 SERPL-SCNC: 33 MMOL/L (ref 21–32)
CREAT 24H UR-MRATE: 0.1 G/24HR (ref 0.8–1.8)
CREAT CL 24H UR+SERPL-VRATE: 1.84 ML/MIN (ref 80–125)
CREAT SERPL-MCNC: 4.06 MG/DL (ref 0.6–1.3)
CREAT UR-MCNC: 52.9 MG/DL
EOSINOPHIL # BLD AUTO: 0.07 THOUSAND/ΜL (ref 0–0.61)
EOSINOPHIL NFR BLD AUTO: 1 % (ref 0–6)
ERYTHROCYTE [DISTWIDTH] IN BLOOD BY AUTOMATED COUNT: 17.4 % (ref 11.6–15.1)
GFR SERPL CREATININE-BSD FRML MDRD: 14 ML/MIN/1.73SQ M
GLUCOSE SERPL-MCNC: 106 MG/DL (ref 65–140)
GLUCOSE SERPL-MCNC: 126 MG/DL (ref 65–140)
GLUCOSE SERPL-MCNC: 141 MG/DL (ref 65–140)
GLUCOSE SERPL-MCNC: 191 MG/DL (ref 65–140)
GLUCOSE SERPL-MCNC: 245 MG/DL (ref 65–140)
HCT VFR BLD AUTO: 29.4 % (ref 36.5–49.3)
HGB BLD-MCNC: 9.2 G/DL (ref 12–17)
IMM GRANULOCYTES # BLD AUTO: 0.05 THOUSAND/UL (ref 0–0.2)
IMM GRANULOCYTES NFR BLD AUTO: 1 % (ref 0–2)
LYMPHOCYTES # BLD AUTO: 1.48 THOUSANDS/ΜL (ref 0.6–4.47)
LYMPHOCYTES NFR BLD AUTO: 14 % (ref 14–44)
MCH RBC QN AUTO: 26.1 PG (ref 26.8–34.3)
MCHC RBC AUTO-ENTMCNC: 31.3 G/DL (ref 31.4–37.4)
MCV RBC AUTO: 83 FL (ref 82–98)
MONOCYTES # BLD AUTO: 0.97 THOUSAND/ΜL (ref 0.17–1.22)
MONOCYTES NFR BLD AUTO: 9 % (ref 4–12)
NEUTROPHILS # BLD AUTO: 7.9 THOUSANDS/ΜL (ref 1.85–7.62)
NEUTS SEG NFR BLD AUTO: 75 % (ref 43–75)
NRBC BLD AUTO-RTO: 0 /100 WBCS
PLATELET # BLD AUTO: 185 THOUSANDS/UL (ref 149–390)
PMV BLD AUTO: 11.8 FL (ref 8.9–12.7)
POTASSIUM SERPL-SCNC: 5.2 MMOL/L (ref 3.5–5.3)
RBC # BLD AUTO: 3.53 MILLION/UL (ref 3.88–5.62)
SODIUM SERPL-SCNC: 127 MMOL/L (ref 136–145)
SPECIMEN VOL UR: 200 ML
WBC # BLD AUTO: 10.5 THOUSAND/UL (ref 4.31–10.16)

## 2019-01-14 PROCEDURE — 99232 SBSQ HOSP IP/OBS MODERATE 35: CPT | Performed by: INTERNAL MEDICINE

## 2019-01-14 PROCEDURE — 90935 HEMODIALYSIS ONE EVALUATION: CPT | Performed by: INTERNAL MEDICINE

## 2019-01-14 PROCEDURE — 92526 ORAL FUNCTION THERAPY: CPT

## 2019-01-14 PROCEDURE — 80048 BASIC METABOLIC PNL TOTAL CA: CPT | Performed by: INTERNAL MEDICINE

## 2019-01-14 PROCEDURE — 85025 COMPLETE CBC W/AUTO DIFF WBC: CPT | Performed by: INTERNAL MEDICINE

## 2019-01-14 PROCEDURE — 82575 CREATININE CLEARANCE TEST: CPT | Performed by: INTERNAL MEDICINE

## 2019-01-14 PROCEDURE — 82948 REAGENT STRIP/BLOOD GLUCOSE: CPT

## 2019-01-14 RX ORDER — ISOSORBIDE MONONITRATE 30 MG/1
30 TABLET, EXTENDED RELEASE ORAL DAILY
Status: DISCONTINUED | OUTPATIENT
Start: 2019-01-14 | End: 2019-01-16 | Stop reason: HOSPADM

## 2019-01-14 RX ORDER — RANOLAZINE 500 MG/1
500 TABLET, EXTENDED RELEASE ORAL EVERY 12 HOURS SCHEDULED
Status: DISCONTINUED | OUTPATIENT
Start: 2019-01-14 | End: 2019-01-16 | Stop reason: HOSPADM

## 2019-01-14 RX ORDER — HEPARIN SODIUM 1000 [USP'U]/ML
10000 INJECTION, SOLUTION INTRAVENOUS; SUBCUTANEOUS ONCE
Status: CANCELLED | OUTPATIENT
Start: 2019-01-14 | End: 2019-01-14

## 2019-01-14 RX ORDER — HEPARIN SODIUM 1000 [USP'U]/ML
400 INJECTION, SOLUTION INTRAVENOUS; SUBCUTANEOUS ONCE
Status: CANCELLED | OUTPATIENT
Start: 2019-01-14 | End: 2019-01-14

## 2019-01-14 RX ORDER — HYDRALAZINE HYDROCHLORIDE 25 MG/1
25 TABLET, FILM COATED ORAL EVERY 8 HOURS SCHEDULED
Status: DISCONTINUED | OUTPATIENT
Start: 2019-01-14 | End: 2019-01-16 | Stop reason: HOSPADM

## 2019-01-14 RX ADMIN — BENZONATATE 100 MG: 100 CAPSULE ORAL at 21:17

## 2019-01-14 RX ADMIN — BENZONATATE 100 MG: 100 CAPSULE ORAL at 16:22

## 2019-01-14 RX ADMIN — ASPIRIN 81 MG 81 MG: 81 TABLET ORAL at 12:13

## 2019-01-14 RX ADMIN — HEPARIN SODIUM 5000 UNITS: 5000 INJECTION INTRAVENOUS; SUBCUTANEOUS at 14:41

## 2019-01-14 RX ADMIN — METOPROLOL TARTRATE 25 MG: 25 TABLET, FILM COATED ORAL at 12:14

## 2019-01-14 RX ADMIN — FUROSEMIDE 40 MG: 40 TABLET ORAL at 18:00

## 2019-01-14 RX ADMIN — CHLORHEXIDINE GLUCONATE 0.12% ORAL RINSE 15 ML: 1.2 LIQUID ORAL at 12:12

## 2019-01-14 RX ADMIN — MELATONIN 3 MG: at 21:18

## 2019-01-14 RX ADMIN — HEPARIN SODIUM 5000 UNITS: 5000 INJECTION INTRAVENOUS; SUBCUTANEOUS at 21:17

## 2019-01-14 RX ADMIN — HYDRALAZINE HYDROCHLORIDE 25 MG: 25 TABLET ORAL at 14:42

## 2019-01-14 RX ADMIN — RANOLAZINE 500 MG: 500 TABLET, FILM COATED, EXTENDED RELEASE ORAL at 16:23

## 2019-01-14 RX ADMIN — METOPROLOL TARTRATE 25 MG: 25 TABLET, FILM COATED ORAL at 21:18

## 2019-01-14 RX ADMIN — INSULIN GLARGINE 24 UNITS: 100 INJECTION, SOLUTION SUBCUTANEOUS at 21:18

## 2019-01-14 RX ADMIN — EPOETIN ALFA 3000 UNITS: 3000 SOLUTION INTRAVENOUS; SUBCUTANEOUS at 09:41

## 2019-01-14 RX ADMIN — PANTOPRAZOLE SODIUM 40 MG: 40 TABLET, DELAYED RELEASE ORAL at 06:42

## 2019-01-14 RX ADMIN — ATORVASTATIN CALCIUM 40 MG: 40 TABLET, FILM COATED ORAL at 16:22

## 2019-01-14 RX ADMIN — FUROSEMIDE 40 MG: 40 TABLET ORAL at 06:42

## 2019-01-14 RX ADMIN — BENZONATATE 100 MG: 100 CAPSULE ORAL at 12:13

## 2019-01-14 RX ADMIN — INSULIN LISPRO 3 UNITS: 100 INJECTION, SOLUTION INTRAVENOUS; SUBCUTANEOUS at 16:25

## 2019-01-14 RX ADMIN — EPOETIN ALFA 2000 UNITS: 2000 SOLUTION INTRAVENOUS; SUBCUTANEOUS at 09:41

## 2019-01-14 RX ADMIN — HEPARIN SODIUM 5000 UNITS: 5000 INJECTION INTRAVENOUS; SUBCUTANEOUS at 06:42

## 2019-01-14 RX ADMIN — INSULIN LISPRO 1 UNITS: 100 INJECTION, SOLUTION INTRAVENOUS; SUBCUTANEOUS at 21:18

## 2019-01-14 RX ADMIN — LACTULOSE 10 G: 10 SOLUTION ORAL at 12:16

## 2019-01-14 RX ADMIN — HYDRALAZINE HYDROCHLORIDE 25 MG: 25 TABLET ORAL at 21:18

## 2019-01-14 RX ADMIN — CHLORHEXIDINE GLUCONATE 0.12% ORAL RINSE 15 ML: 1.2 LIQUID ORAL at 21:17

## 2019-01-14 RX ADMIN — ISOSORBIDE MONONITRATE 30 MG: 30 TABLET, EXTENDED RELEASE ORAL at 16:22

## 2019-01-14 RX ADMIN — Medication 1 CAPSULE: at 16:22

## 2019-01-14 RX ADMIN — FUROSEMIDE 40 MG: 40 TABLET ORAL at 12:18

## 2019-01-14 NOTE — RESTORATIVE TECHNICIAN NOTE
Restorative Specialist Mobility Note       Activity: Ambulate in [place] (to the w/c for dialysis)     Assistive Device: Front wheel walker

## 2019-01-14 NOTE — PROGRESS NOTES
NEPHROLOGY PROGRESS NOTE   Susan Busch 72 y o  male MRN: [id number]  Unit/Bed#: [facility] 403-01 Encounter: [id number]      ASSESSMENT & PLAN:  1  Acute kidney injury on top of CKD that progressed to end-stage renal disease  Patient seen during dialysis treatment, his PermCath is accessed good blood flow  Continue with hemodialysis on a MWF schedule  Save nondominant arm (left arm) for dialysis access in the future  2  Non STEMI, Systolic congestive heart failure, ischemic cardiomyopathy with an EF of 35%, multivessel coronary artery disease  Cardiology and cardiac surgery on board  Cardiac surgery has canceled surgery due to concerns for patient not going to participate in postoperative care  3  Hyponatremia, continue with UF as tolerated and fluid restriction  Continue with diuresis  4  Anemia of chronic disease, follow H&H and transfusion as needed  5  Hyperkalemia, continue with 2 potassium bath  6  Access, right IJ PermCath access with good blood flow    Discussed with primary team resident  SUBJECTIVE:  Patient seen and examined during dialysis treatment, his central venous catheter is accessed good blood flow, patient denies any chest pain but continues feeling short of breath with exertion  Patient seems to be anxious and wondering about any further cardiac intervention    OBJECTIVE:  Current Weight: Weight - Scale: 63 9 kg (140 lb 14 oz)  Vitals:    01/14/19 0825   BP: 132/68   Pulse: 88   Resp: 16   Temp:    SpO2:        Intake/Output Summary (Last 24 hours) at 01/14/19 0858  Last data filed at 01/14/19 0825   Gross per 24 hour   Intake             1290 ml   Output              650 ml   Net              640 ml     General: conscious, cooperative, in not acute distress  Eyes: conjunctivae pale, anicteric sclerae  ENT: lips and mucous membranes moist  Neck: supple    Chest:  Decreased breath sounds bases bilaterally, no crackles, ronchus or wheezings  CVS: distinct S1 & S2, normal rate, regular rhythm  Abdomen: soft, non-tender, non-distended, normoactive bowel sounds  Extremities: no significant edema of both legs  Skin: no rash  Neuro: awake, alert, oriented      Medications:    Current Facility-Administered Medications:     acetaminophen (TYLENOL) tablet 650 mg, 650 mg, Oral, Q6H PRN, Annalisa Crockett MD, 650 mg at 01/13/19 0027    aspirin chewable tablet 81 mg, 81 mg, Oral, Daily, Annalisa Crockett MD, 81 mg at 01/13/19 0813    atorvastatin (LIPITOR) tablet 40 mg, 40 mg, Oral, Daily With Derek Roberson MD, 40 mg at 01/13/19 1759    b complex-vitamin C-folic acid (NEPHROCAPS) capsule 1 capsule, 1 capsule, Oral, Daily With Derek Roberson MD, 1 capsule at 01/13/19 1759    benzonatate (TESSALON PERLES) capsule 100 mg, 100 mg, Oral, TID, Annalisa Crockett MD, 100 mg at 01/13/19 2145    calcium carbonate (TUMS) chewable tablet 500 mg, 500 mg, Oral, Daily PRN, Edita Forman MD, 500 mg at 01/13/19 0027    chlorhexidine (PERIDEX) 0 12 % oral rinse 15 mL, 15 mL, Swish & Spit, Q12H [facility], 15 mL at 01/13/19 2145    docusate sodium (COLACE) capsule 100 mg, 100 mg, Oral, BID PRN, Edita Forman MD, 100 mg at 01/12/19 0201    epoetin deepa (EPOGEN,PROCRIT) injection 2,000 Units, 2,000 Units, Intravenous, After Dialysis, 2,000 Units at 01/11/19 0810 **AND** epoetin deepa (EPOGEN,PROCRIT) injection 3,000 Units, 3,000 Units, Intravenous, After Dialysis, Annalisa Crockett MD, 3,000 Units at 01/11/19 0810    furosemide (LASIX) tablet 40 mg, 40 mg, Oral, TID (diuretic), Renu Oliva DO, 40 mg at 01/14/19 8689    heparin (porcine) subcutaneous injection 5,000 Units, 5,000 Units, Subcutaneous, Q8H [facility] KADEEM Curtis, 5,000 Units at 01/14/19 5804    insulin glargine (LANTUS) subcutaneous injection 24 Units 0 24 mL, 24 Units, Subcutaneous, HS, Giorgio Silvestre DO, 24 Units at 01/13/19 2145    insulin lispro (HumaLOG) 100 units/mL subcutaneous injection 1-5 Units, 1-5 Units, Subcutaneous, HS, Ciera Hinton MD, 3 Units at 01/13/19 2146    insulin lispro (HumaLOG) 100 units/mL subcutaneous injection 1-6 Units, 1-6 Units, Subcutaneous, TID AC, 3 Units at 01/13/19 1700 **AND** Fingerstick Glucose (POCT), , , TID AC, Ciera Hinton MD    insulin lispro (HumaLOG) 100 units/mL subcutaneous injection 10 Units, 10 Units, Subcutaneous, Daily With Ashlyn Randall DO, 10 Units at 01/13/19 1700    insulin lispro (HumaLOG) 100 units/mL subcutaneous injection 8 Units, 8 Units, Subcutaneous, BID before breakfast/lunch, Kruse Dinning, DO, 8 Units at 01/13/19 0653    lactulose 20 g/30 mL oral solution 10 g, 10 g, Oral, Daily, Hu Chamberlain DO    melatonin tablet 3 mg, 3 mg, Oral, HS, Ciera Hinton MD, 3 mg at 01/13/19 2146    metoprolol tartrate (LOPRESSOR) partial tablet 12 5 mg, 12 5 mg, Oral, Once, Cassidy Prieto PA-C    metoprolol tartrate (LOPRESSOR) tablet 25 mg, 25 mg, Oral, Q12H [facility], Ciera Hinton MD, 25 mg at 01/13/19 2146    nitroglycerin (TRIDIL) 3,500 mcg, verapamil (ISOPTIN) 7 5 mg in multi-electrolyte (ISOLYTE-S PH 7 4 equivalent) 500 mL IV bag, , Irrigation, Once, Eliza Le DO    ondansetron [facility]) injection 4 mg, 4 mg, Intravenous, Q6H PRN, Ciera Hinton MD    pantoprazole (PROTONIX) EC tablet 40 mg, 40 mg, Oral, Early Morning, Ciera Hinton MD, 40 mg at 01/14/19 2466    polyethylene glycol (MIRALAX) packet 17 g, 17 g, Oral, Daily PRN, Warden Suim MD    Invasive Devices:        Lab Results:     Results from last 7 days  Lab Units 01/14/19  0445 01/13/19  0449 01/12/19  0446  01/10/19  0442   WBC Thousand/uL 10 50* 8 81 9 48  < > 9 93   HEMOGLOBIN g/dL 9 2* 9 1* 9 7*  < > 9 7*   HEMATOCRIT % 29 4* 29 2* 31 8*  < > 31 5*   PLATELETS Thousands/uL 185 139* 120*  < > 118*   SODIUM mmol/L 127* 127* 129*  < > 127*   POTASSIUM mmol/L 5 2 5 4* 4 9  < > 4 8   CHLORIDE mmol/L 90* 93* 93*  < > 94*   CO2 mmol/L 33* 31 30  < > 30   BUN mg/dL 44* 34* 22  < > 27*   CREATININE mg/dL 4 06* 3 64* 3 07*  < > 3 51*   CALCIUM mg/dL 8 9 8 9 8 6  < > 8 8   MAGNESIUM mg/dL  --   --   --   --  2 3   PHOSPHORUS mg/dL  --   --   --   --  2 2*   < > = values in this interval not displayed  Portions of the record may have been created with voice recognition software  Occasional wrong word or "sound a like" substitutions may have occurred due to the inherent limitations of voice recognition software  Read the chart carefully and recognize, using context, where substitutions have occurred  If you have any questions, please contact the dictating provider

## 2019-01-14 NOTE — PLAN OF CARE
Problem: CARDIOVASCULAR - ADULT  Goal: Maintains optimal cardiac output and hemodynamic stability  INTERVENTIONS:  - Monitor I/O, vital signs and rhythm  - Monitor for S/S and trends of decreased cardiac output i e  bleeding, hypotension  - Administer and titrate ordered vasoactive medications to optimize hemodynamic stability  - Assess quality of pulses, skin color and temperature  - Assess for signs of decreased coronary artery perfusion - ex   Angina  - Instruct patient to report change in severity of symptoms   Outcome: Progressing    Goal: Absence of cardiac dysrhythmias or at baseline rhythm  INTERVENTIONS:  - Continuous cardiac monitoring, monitor vital signs, obtain 12 lead EKG if indicated  - Administer antiarrhythmic and heart rate control medications as ordered  - Monitor electrolytes and administer replacement therapy as ordered   Outcome: Progressing      Problem: HEMATOLOGIC - ADULT  Goal: Maintains hematologic stability  INTERVENTIONS  - Assess for signs and symptoms of bleeding or hemorrhage  - Monitor labs  - Administer supportive blood products/factors as ordered and appropriate   Outcome: Progressing      Problem: GENITOURINARY - ADULT  Goal: Maintains or returns to baseline urinary function  INTERVENTIONS:  - Assess urinary function  - Encourage oral fluids to ensure adequate hydration  - Administer IV fluids as ordered to ensure adequate hydration  - Administer ordered medications as needed  - Offer frequent toileting  - Follow urinary retention protocol if ordered   Outcome: Progressing    Goal: Absence of urinary retention  INTERVENTIONS:  - Assess patients ability to void and empty bladder  - Monitor I/O  - Bladder scan as needed  - Discuss with physician/AP medications to alleviate retention as needed  - Discuss catheterization for long term situations as appropriate   Outcome: Progressing    Goal: Urinary catheter remains patent  INTERVENTIONS:  - Assess patency of urinary catheter  - If patient has a chronic goss, consider changing catheter if non-functioning  - Follow guidelines for intermittent irrigation of non-functioning urinary catheter   Outcome: Progressing      Problem: METABOLIC, FLUID AND ELECTROLYTES - ADULT  Goal: Electrolytes maintained within normal limits  INTERVENTIONS:  - Monitor labs and assess patient for signs and symptoms of electrolyte imbalances  - Administer electrolyte replacement as ordered  - Monitor response to electrolyte replacements, including repeat lab results as appropriate  - Instruct patient on fluid and nutrition as appropriate   Outcome: Progressing    Goal: Fluid balance maintained  INTERVENTIONS:  - Monitor labs and assess for signs and symptoms of volume excess or deficit  - Monitor I/O and WT  - Instruct patient on fluid and nutrition as appropriate   Outcome: Progressing    Goal: Glucose maintained within target range  INTERVENTIONS:  - Monitor Blood Glucose as ordered  - Assess for signs and symptoms of hyperglycemia and hypoglycemia  - Administer ordered medications to maintain glucose within target range  - Assess nutritional intake and initiate nutrition service referral as needed   Outcome: Progressing      Problem: Potential for Falls  Goal: Patient will remain free of falls  INTERVENTIONS:  - Assess patient frequently for physical needs  -  Identify cognitive and physical deficits and behaviors that affect risk of falls    -  [place] fall precautions as indicated by assessment   - Educate patient/family on patient safety including physical limitations  - Instruct patient to call for assistance with activity based on assessment  - Modify environment to reduce risk of injury  - Consider OT/PT consult to assist with strengthening/mobility   Outcome: Progressing      Problem: PAIN - ADULT  Goal: Verbalizes/displays adequate comfort level or baseline comfort level  Interventions:  - Encourage patient to monitor pain and request assistance  - Assess pain using appropriate pain scale  - Administer analgesics based on type and severity of pain and evaluate response  - Implement non-pharmacological measures as appropriate and evaluate response  - Consider cultural and social influences on pain and pain management  - Notify physician/advanced practitioner if interventions unsuccessful or patient reports new pain   Outcome: Progressing      Problem: INFECTION - ADULT  Goal: Absence or prevention of progression during hospitalization  INTERVENTIONS:  - Assess and monitor for signs and symptoms of infection  - Monitor lab/diagnostic results  - Monitor all insertion sites, i e  indwelling lines, tubes, and drains  - Monitor endotracheal (as able) and nasal secretions for changes in amount and color  - [place] appropriate cooling/warming therapies per order  - Administer medications as ordered  - Instruct and encourage patient and family to use good hand hygiene technique  - Identify and instruct in appropriate isolation precautions for identified infection/condition   Outcome: Progressing      Problem: SAFETY ADULT  Goal: Maintain or return to baseline ADL function  INTERVENTIONS:  -  Assess patient's ability to carry out ADLs; assess patient's baseline for ADL function and identify physical deficits which impact ability to perform ADLs (bathing, care of mouth/teeth, toileting, grooming, dressing, etc )  - Assess/evaluate cause of self-care deficits   - Assess range of motion  - Assess patient's mobility; develop plan if impaired  - Assess patient's need for assistive devices and provide as appropriate  - Encourage maximum independence but intervene and supervise when necessary  ¯ Involve family in performance of ADLs  ¯ Assess for home care needs following discharge   ¯ Request OT consult to assist with ADL evaluation and planning for discharge  ¯ Provide patient education as appropriate   Outcome: Progressing    Goal: Maintain or return mobility status to optimal level  INTERVENTIONS:  - Assess patient's baseline mobility status (ambulation, transfers, stairs, etc )    - Identify cognitive and physical deficits and behaviors that affect mobility  - Identify mobility aids required to assist with transfers and/or ambulation (gait belt, sit-to-stand, lift, walker, cane, etc )  - [place] fall precautions as indicated by assessment  - Record patient progress and toleration of activity level on Mobility SBAR; progress patient to next Phase/Stage  - Instruct patient to call for assistance with activity based on assessment  - Request Rehabilitation consult to assist with strengthening/weightbearing, etc    Outcome: Progressing      Problem: DISCHARGE PLANNING  Goal: Discharge to home or other facility with appropriate resources  INTERVENTIONS:  - Identify barriers to discharge w/patient and caregiver  - Arrange for needed discharge resources and transportation as appropriate  - Identify discharge learning needs (meds, wound care, etc )  - Arrange for interpretive services to assist at discharge as needed  - Refer to Case Management Department for coordinating discharge planning if the patient needs post-hospital services based on physician/advanced practitioner order or complex needs related to functional status, cognitive ability, or social support system   Outcome: Progressing      Problem: Knowledge Deficit  Goal: Patient/family/caregiver demonstrates understanding of disease process, treatment plan, medications, and discharge instructions  Complete learning assessment and assess knowledge base    Interventions:  - Provide teaching at level of understanding  - Provide teaching via preferred learning methods   Outcome: Progressing      Problem: Prexisting or High Potential for Compromised Skin Integrity  Goal: Skin integrity is maintained or improved  INTERVENTIONS:  - Identify patients at risk for skin breakdown  - Assess and monitor skin integrity  - Assess and monitor nutrition and hydration status  - Monitor labs (i e  albumin)  - Assess for incontinence   - Turn and reposition patient  - Assist with mobility/ambulation  - Relieve pressure over bony prominences  - Avoid friction and shearing  - Provide appropriate hygiene as needed including keeping skin clean and dry  - Evaluate need for skin moisturizer/barrier cream  - Collaborate with interdisciplinary team (i e  Nutrition, Rehabilitation, etc )   - Patient/family teaching   Outcome: Progressing      Problem: Nutrition/Hydration-ADULT  Goal: Nutrient/Hydration intake appropriate for improving, restoring or maintaining nutritional needs  Monitor and assess patient's nutrition/hydration status for malnutrition (ex- brittle hair, bruises, dry skin, pale skin and conjunctiva, muscle wasting, smooth red tongue, and disorientation)  Collaborate with interdisciplinary team and initiate plan and interventions as ordered  Monitor patient's weight and dietary intake as ordered or per policy  Utilize nutrition screening tool and intervene per policy  Determine patient's food preferences and provide high-protein, high-caloric foods as appropriate       INTERVENTIONS:  - Monitor oral intake, urinary output, labs, and treatment plans  - Assess nutrition and hydration status and recommend course of action  - Evaluate amount of meals eaten  - Assist patient with eating if necessary   - Allow adequate time for meals  - Recommend/ encourage appropriate diets, oral nutritional supplements, and vitamin/mineral supplements  - Order, calculate, and assess calorie counts as needed  - Recommend, monitor, and adjust tube feedings and TPN/PPN based on assessed needs  - Assess need for intravenous fluids  - Provide specific nutrition/hydration education as appropriate  - Include patient/family/caregiver in decisions related to nutrition   Outcome: Progressing      Problem: DISCHARGE PLANNING - CARE MANAGEMENT  Goal: Discharge to post-acute care or home with appropriate resources  INTERVENTIONS:  - Conduct assessment to determine patient/family and health care team treatment goals, and need for post-acute services based on payer coverage, community resources, and patient preferences, and barriers to discharge  - Address psychosocial, clinical, and financial barriers to discharge as identified in assessment in conjunction with the patient/family and health care team  - Arrange appropriate level of post-acute services according to patient's   needs and preference and payer coverage in collaboration with the physician and health care team  - Communicate with and update the patient/family, physician, and health care team regarding progress on the discharge plan  - Arrange appropriate transportation to post-acute venues    Plan home with family transporting,  following      Outcome: Progressing

## 2019-01-14 NOTE — PROGRESS NOTES
Cardiology Progress Note - Cali Turner 72 y o  male MRN: [id number]    Unit/Bed#: [facility] 403-01 Encounter: [id number]        Subjective:    No significant events overnight  Resting comfortably, but when woke up stated he is "suffering "     Review of Systems   Cardiovascular: Positive for chest pain  Negative for leg swelling and palpitations  Respiratory: Negative for shortness of breath  Objective:   Vitals: Blood pressure 142/67, pulse 94, temperature 97 9 °F (36 6 °C), temperature source Oral, resp  rate 17, height 5' 5" (1 651 m), weight 63 9 kg (140 lb 14 oz), SpO2 100 %  , Body mass index is 23 44 kg/m² , Orthostatic Blood Pressures      Most Recent Value   Blood Pressure  142/67 filed at 01/14/2019 1213   Patient Position - Orthostatic VS  Sitting filed at 01/14/2019 7186         Systolic (69IPX), UBV:306 , Min:112 , JUAN C:064     Diastolic (47KUI), TPH:52, Min:43, Max:89      Intake/Output Summary (Last 24 hours) at 01/14/19 1343  Last data filed at 01/14/19 0825   Gross per 24 hour   Intake             1070 ml   Output              650 ml   Net              420 ml     Weight (last 2 days)     Date/Time   Weight    01/14/19 0600  63 9 (140 87)    01/13/19 0615  63 4 (139 77)    01/12/19 0526  60 4 (133 16)                  Telemetry Review: NSR    Physical Exam   Cardiovascular: Normal rate, regular rhythm and normal heart sounds  Exam reveals no gallop and no friction rub  No murmur heard  Pulmonary/Chest: Breath sounds normal  He has no wheezes  He has no rales  Musculoskeletal: He exhibits no edema           Laboratory Results:        CBC with diff:   Results from last 7 days  Lab Units 01/14/19  0445 01/13/19  0449 01/12/19  0446 01/11/19  0455 01/10/19  0442 01/09/19  1400 01/09/19  0356   WBC Thousand/uL 10 50* 8 81 9 48 8 43 9 93 10 13 9 61   HEMOGLOBIN g/dL 9 2* 9 1* 9 7* 9 7* 9 7* 9 2* 9 6*   HEMATOCRIT % 29 4* 29 2* 31 8* 31 4* 31 5* 29 1* 30 7*   MCV fL 83 83 85 85 84 82 83 PLATELETS Thousands/uL 185 139* 120* 137* 118* 138* 125*   MCH pg 26 1* 26 0* 25 8* 26 1* 25 9* 26 1* 26 1*   MCHC g/dL 31 3* 31 2* 30 5* 30 9* 30 8* 31 6 31 3*   RDW % 17 4* 17 3* 17 3* 17 1* 16 7* 16 5* 16 3*   MPV fL 11 8 12 1 12 1 12 4 12 5 12 2 11 5   NRBC AUTO /100 WBCs 0 0 0 0 0 0 0         CMP:  Results from last 7 days  Lab Units 01/14/19  0445 01/13/19  0449 01/12/19  0446 01/11/19  0455 01/10/19  0442 01/09/19  1400 01/09/19  0355   POTASSIUM mmol/L 5 2 5 4* 4 9 4 9 4 8 4 4 4 5   CHLORIDE mmol/L 90* 93* 93* 95* 94* 87* 88*   CO2 mmol/L 33* 31 30 28 30 33* 33*   BUN mg/dL 44* 34* 22 34* 27* 40* 37*   CREATININE mg/dL 4 06* 3 64* 3 07* 4 02* 3 51* 4 48* 4 19*   CALCIUM mg/dL 8 9 8 9 8 6 9 2 8 8 9 1 9 0   EGFR ml/min/1 73sq m 14 16 19 14 17 12 13         BMP:  Results from last 7 days  Lab Units 01/14/19  0445 01/13/19  0449 01/12/19  0446 01/11/19  0455 01/10/19  0442 01/09/19  1400 01/09/19  0355   POTASSIUM mmol/L 5 2 5 4* 4 9 4 9 4 8 4 4 4 5   CHLORIDE mmol/L 90* 93* 93* 95* 94* 87* 88*   CO2 mmol/L 33* 31 30 28 30 33* 33*   BUN mg/dL 44* 34* 22 34* 27* 40* 37*   CREATININE mg/dL 4 06* 3 64* 3 07* 4 02* 3 51* 4 48* 4 19*   CALCIUM mg/dL 8 9 8 9 8 6 9 2 8 8 9 1 9 0         Magnesium:   Results from last 7 days  Lab Units 01/10/19  0442   MAGNESIUM mg/dL 2 3       Coags:   Results from last 7 days  Lab Units 01/10/19  0232 01/09/19  1852 01/09/19  1140 01/09/19  0355 01/08/19  1953 01/08/19  1250 01/08/19  0449   PTT seconds 58* 64* 96* 44* 59* 55* 95*       TSH:       Lipid Profile:     Results from last 7 days  Lab Units 01/10/19  0442   TRIGLYCERIDES mg/dL 65   HDL mg/dL 48           Meds/Allergies     Current Facility-Administered Medications:  acetaminophen 650 mg Oral Q6H PRN Blossom Stover MD   aspirin 81 mg Oral Daily Blossom Stover MD   atorvastatin 40 mg Oral Daily With Estephanie Rodriguez MD   b complex-vitamin C-folic acid 1 capsule Oral Daily With Dinner Blossom Stover MD   benzonatate 100 mg Oral TID Blossom Stover MD   calcium carbonate 500 mg Oral Daily PRN Edita Forman MD   chlorhexidine 15 mL Swish & Spit Q12H [address] Eulalia Mayfield PA-C   docusate sodium 100 mg Oral BID PRN Edita Forman MD   epoetin deepa 2,000 Units Intravenous After Dialysis Blossom Stover MD   And       epoetin deepa 3,000 Units Intravenous After Dialysis Blossom Stover MD   furosemide 40 mg Oral TID (diuretic) Paulino Morales,    heparin (porcine) 5,000 Units Subcutaneous [facility] Maribel Curtis PA-C   insulin glargine 24 Units Subcutaneous HS Jose M Mitchell,    insulin lispro 1-5 Units Subcutaneous HS Blossom Stover MD   insulin lispro 1-6 Units Subcutaneous TID AC Blossom Stover MD   insulin lispro 10 Units Subcutaneous Daily With Dinner Jose M Mitchell DO   insulin lispro 8 Units Subcutaneous BID before breakfast/lunch Jose M Mitchell DO   lactulose 10 g Oral Daily Magan Rose, DO   melatonin 3 mg Oral HS Blossom Stover MD   metoprolol tartrate 12 5 mg Oral Once Shi Blair PA-C   metoprolol tartrate 25 mg Oral Q12H [address] Blossom Stover MD   nitroglycerin 3 5 mg and verapamil 7 5 mg in isolyte equivalent 500 mL bag  Irrigation Once Carly Marzena, DO   ondansetron 4 mg Intravenous Q6H PRN Blossom Stover MD   pantoprazole 40 mg Oral Early Morning Blossom Stover MD   polyethylene glycol 17 g Oral Daily PRN Henny Ray MD        Prescriptions Prior to Admission   Medication    aspirin 81 mg chewable tablet    atorvastatin (LIPITOR) 10 mg tablet    benzonatate (TESSALON PERLES) 100 mg capsule    METOPROLOL SUCCINATE PO    pantoprazole (PROTONIX) 40 mg tablet    sodium bicarbonate 325 MG tablet    torsemide (DEMADEX) 20 mg tablet       Assessment:  Principal Problem:    MI, acute, non ST segment elevation (HCC)  Active Problems:    NATALIIA (acute kidney injury) ([facility] )    Acute on chronic congestive heart failure ([facility] )    Type 2 diabetes mellitus with kidney complication, with long-term current use of insulin (HCC)    Dysphagia    Weight loss    Coronary artery disease involving native coronary artery    Disease of cardiovascular system      Impression:  1  Elevated troponin due to CHF  Patient with LM/LAD dz  Deemed high surgical risk due to deconditioning  No PCI options per Interventional Cardiology  2  Ischemic CMP - EF 30%  3  ESRD - on HD  4  Moderate MR  Plan:  1  Medical management  2  Start Hydralazine/Imdur  3  Start Ranexa

## 2019-01-14 NOTE — SPEECH THERAPY NOTE
Speech Language/Pathology    Speech/Language Pathology Progress Note    Patient Name: Brandi Rocha  EBQFN'U Date: 1/14/2019     Problem List  Patient Active Problem List   Diagnosis    Shortness of breath    Chest pain    NATALIIA ([facility] kidney injury) ([facility] )    CKD (chronic kidney disease)    Cardiomyopathy ([facility] )    Hyponatremia    MI, acute, non ST segment elevation ([facility] )    Acute on chronic congestive heart failure (HCC)    Anemia due to chronic kidney disease, on chronic dialysis ([facility] )    Type 2 diabetes mellitus with kidney complication, with long-term current use of insulin (HCC)    Dysphagia    Weight loss    Coronary artery disease involving native coronary artery    Disease of cardiovascular system        Past Medical History  Past Medical History:   Diagnosis Date    [facility]est[facility]     Diabetes mellitus ([place] [address] )     Dysphagia     Hyperlipidemia     Hypertension     Myocardial infarction [facility])         Past Surgical History  Past Surgical History:   Procedure Laterality Date    CATARACT EXTRACTION      ESOPHAGOGASTRODUODENOSCOPY N/A 1/10/2019    Procedure: ESOPHAGOGASTRODUODENOSCOPY (EGD); Surgeon: Maldonado Linda MD;  Location: [facility]; Service: Gastroenterology    IR [id number] [facility] PLACEMENT  1/4/2019         Subjective:  "Its hard to swallow when you can't breath"  Patient is awake and alert  Seen with family at bedside  Objective: The patient is very resistive to PO intake  Family report that patient was only taking liquids at home, and that he would masticate for long periods of time and not swallow  The patient states that he has no taste and no interest in food, with difficulty coordinating swallowing and breathing  With encouragement, the patient does accept banana  Bite strength is adequate  Mastication time is prolonged, but functional with no oral residue  The patient takes 2 sips of thin liquids via straw   Delayed cough x1 observed, but does not appear related to PO intake  Swallow appears functional at bedside  Assessment:  PO intake is poor, but patient tolerates soft banana well  Plan/Recommendations:  Continue mechanical soft diet with thin liquids  ST will f/u and would benefit from trial with full tray

## 2019-01-14 NOTE — UTILIZATION REVIEW
Continued Stay Review    Date: 01/12/2019  Vital Signs: /67   Pulse 94   Temp 98 1 °F (36 7 °C) (Oral)   Resp 16   Ht 5' 5" (1 651 m)   Wt 63 9 kg (140 lb 14 oz)   SpO2 94%   BMI 23 44 kg/m²   Assessment/Plan:   Coronary artery disease - continue aspirin, lipitor  Patient with multivessel disease per cardiac cath  - patient is scheduled for CABG on Monday     Congestive heart failure  Acute on chronic congestive heart failure with an EF of 35 %  - MARQUISE demonstrates EF of 30%, diffuse hypokinesis, moderate mitral regurgitation  - continue furosemide 40 mg 2 x a day, lopressor 25 mg every 12 hours     Type 2 diabetes - currently on Lantus 24 units, Humalog 10 units with dinner, 8 units before breakfast and 10 units before lunch + insulin sliding scale   Hemoglobin A1c of 8 2 on admission  - endocrinology following     Dysphagia - Patient complains of dysphagia and associated 30 lb weight loss over the past 6 months  - EGD - no esophagitis or anatomic obstruction  - appreciate speech therapy recommendations  - dysphagia diet     Acute kidney injury - baseline Cr unknown  - patient is currently on epogen injections  - hemodialysis on MWF     Anxiety - currently not on any medications     Constipation - bowel regimen - colace + miralax prn  - had small bowel movement, will monitor possibly add milk of magnesia if needed  Medications:   Scheduled Meds:   Current Facility-Administered Medications:  acetaminophen 650 mg Oral Q6H PRN Jenna Perez MD   aspirin 81 mg Oral Daily Jenna Perez MD   atorvastatin 40 mg Oral Daily With Reno Saunders MD   b complex-vitamin C-folic acid 1 capsule Oral Daily With Reno Saunders MD   benzonatate 100 mg Oral TID Jenna Perez MD   calcium carbonate 500 mg Oral Daily PRN Edita Forman MD   chlorhexidine 15 mL Swish & Spit Q12H [facility] Viraj Ramirez PA-C   docusate sodium 100 mg Oral BID PRN Edita Forman MD   epoetin deepa 2,000 Units Intravenous After Dialysis Vince Jack MD   And       epoetin deepa 3,000 Units Intravenous After Dialysis Vince Jack MD   furosemide 40 mg Oral TID (diuretic) Niurka Beth DO   heparin (porcine) 5,000 Units Subcutaneous [facility] [place], [place]   insulin glargine 24 Units Subcutaneous HS Nilton Rainey,    insulin lispro 1-5 Units Subcutaneous HS Vince Jack MD   insulin lispro 1-6 Units Subcutaneous TID AC Vince Jack MD   insulin lispro 10 Units Subcutaneous Daily With Sammy Hendricks DO   insulin lispro 8 Units Subcutaneous BID before breakfast/lunch Nilton Rainey,    lactulose 10 g Oral Daily Laura Cohn DO   melatonin 3 mg Oral HS Vince Jack MD   metoprolol tartrate 12 5 mg Oral Once Adenike Christianson PA-C   metoprolol tartrate 25 mg Oral Q12H Louisa Choudhury MD   nitroglycerin 3 5 mg and verapamil 7 5 mg in isolyte equivalent 500 mL bag  Irrigation Once Purvi Bundy DO   ondansetron 4 mg Intravenous Q6H PRN Vince Jack MD   pantoprazole 40 mg Oral Early Morning Vince Jack MD   polyethylene glycol 17 g Oral Daily PRN Deepthi Ayoub MD   Pertinent Labs/Diagnostic Results:   Age/Sex: 72 y o  male   Discharge Plan: TBD

## 2019-01-14 NOTE — MEDICAL STUDENT
Progress Note - General Surgery   Gersonelyfernanda Seat 72 y o  male MRN: [id number]  Unit/Bed#: [facility] 403-01 Encounter: [id number]    Plan:   Increase lantus to 26 units HS, increase humalog to 10 units for breakfast and lunch, and to 15 units for dinner  Subjective:   Patient deemed inappropriate for CABG 2/2 refusing PT, walking and food in the setting of multiple co morbidities  Patient currently on 24 units lantus, 8 units humalog with breakfast and lunch, 10 units with dinner plus sliding scale  Objective:     Blood pressure 114/57, pulse 88, temperature 98 1 °F (36 7 °C), temperature source Oral, resp  rate 16, height 5' 5" (1 651 m), weight 63 9 kg (140 lb 14 oz), SpO2 94 %  ,Body mass index is 23 44 kg/m²        Intake/Output Summary (Last 24 hours) at 01/14/19 1114  Last data filed at 01/14/19 0825   Gross per 24 hour   Intake             1290 ml   Output              650 ml   Net              640 ml       Invasive Devices     Peripheral Intravenous Line            Peripheral IV 01/13/19 Right Forearm 1 day          Hemodialysis Catheter            Permanent HD Catheter  10 days                Lab, Imaging and other studies:  CBC:   Lab Results   Component Value Date    WBC 10 50 (H) 01/14/2019    HGB 9 2 (L) 01/14/2019    HCT 29 4 (L) 01/14/2019    MCV 83 01/14/2019     01/14/2019    MCH 26 1 (L) 01/14/2019    MCHC 31 3 (L) 01/14/2019    RDW 17 4 (H) 01/14/2019    MPV 11 8 01/14/2019    NRBC 0 01/14/2019   , CMP:   Lab Results   Component Value Date    SODIUM 127 (L) 01/14/2019    K 5 2 01/14/2019    CL 90 (L) 01/14/2019    CO2 33 (H) 01/14/2019    BUN 44 (H) 01/14/2019    CREATININE 4 06 (H) 01/14/2019    CALCIUM 8 9 01/14/2019    EGFR 14 01/14/2019     Creatinine clearance 24 hr urine 1 84 (L)  Creatinine 24 hr urine 0 1 (L)

## 2019-01-14 NOTE — PROGRESS NOTES
Progress Note Dino Rowe 72 y o  male MRN: [id number]    Unit/Bed#: [facility] 403-01 Encounter: [id number]      CC: diabetes f/u    Subjective: Hina Castaneda is a 72y o  year old male with type 2 diabetes  Feels ok  Appetite is poor  Only really eating softs  No hypoglycemia  Objective:     Vitals: Blood pressure 129/68, pulse 82, temperature 97 8 °F (36 6 °C), temperature source Oral, resp  rate 18, height 5' 5" (1 651 m), weight 63 9 kg (140 lb 14 oz), SpO2 95 %  ,Body mass index is 23 44 kg/m²  Intake/Output Summary (Last 24 hours) at 01/14/19 1557  Last data filed at 01/14/19 1420   Gross per 24 hour   Intake             1570 ml   Output             4004 ml   Net            -2434 ml       Physical Exam:  General: No acute distress  Alert, awake, and oriented x3  HEENT: Normocephalic, atraumatic  Heart: Regular rate and rhythm  Lungs: Clear  No respiratory distress  Extremities: No edema  Skin: Warm, dry  No rash  Neuro: Moves all 4 extremities spontaneously  Psych: Appropriate mood and affect  Cooperative  Lab, Imaging and other studies: I have personally reviewed pertinent reports  POC Glucose (mg/dl)   Date Value   01/14/2019 141 (H)   01/14/2019 126   01/13/2019 283 (H)   01/13/2019 251 (H)   01/13/2019 151 (H)   01/13/2019 188 (H)   01/12/2019 288 (H)   01/12/2019 279 (H)   01/12/2019 148 (H)   01/12/2019 235 (H)       Assessment/Plan:  1  Type 2 diabetes with hyperglycemia:  So far today blood sugars are reasonably well controlled on Lantus 24 units q h s   Continue Humalog 8 units with breakfast, 8 units with lunch, 10 units with dinner  Continue scale for correction  Continue to follow and adjust regimen as needed  Monitor for hypoglycemia  2  Cad: cARE PER PRIMARY TEAM, CARDIOLOGY, CARDIAC SURGERY  Portions of the record may have been created with voice recognition software   Occasional wrong word or "sound a like" substitutions may have occurred due to the inherent limitations of voice recognition software  Read the chart carefully and recognize, using context, where substitutions have occurred

## 2019-01-14 NOTE — PROGRESS NOTES
IM Residency Progress Note   Unit/Bed#: [facility] 403-01 Encounter: [id number]  SOD Team A      Alondra Seat 72 y o  male Pr-2 [place] Stay Days: 6      Assessment/Plan:    Principal Problem:    MI, acute, non ST segment elevation ([facility] [address] )  Active Problems:    NATALIIA (acute kidney injury) ([facility] [address] )    Acute on chronic congestive heart failure (HCC)    Type 2 diabetes mellitus with kidney complication, with long-term current use of insulin (HCC)    Dysphagia    Weight loss    Coronary artery disease involving native coronary artery    Disease of cardiovascular system    Coronary artery disease  Patient with multivessel disease; not appropriate for CABG given poor participation in care  - Interventional cardiology consulted for possible stenting  - CT surgery to reassess later this week  - Patient says that he did not understand what was expected of him; will f/u with CT surgery depending on interventional cardiology evaluation, however it appears that Dr Jennifer Michael had discussed this with patient and family at length     Type 2 diabetes  Hemoglobin A1c of 8 2 on presentation   Appreciate endocrinology recommendations - now on 24 units long-acting, 8 units with breakfast and lunch, 10 units with dinner, sliding scale algorithm 3 for t i d, algorithm 2 for QHS     Acute kidney injury  Baseline creatinine unknown  creatinine of 4 0 today  has been undergoing hemodialysis  - hemodialysis Monday Wednesday Friday with volume removal  - hyponatremia to 127 today, hyperkalemia to 5 2 today  - nephrology following     Congestive heart failure  Acute on chronic congestive heart failure with an EF of 35 on recent echo  - MARQUISE demonstrates EF of 30%, diffuse hypokinesis, moderate mitral regurgitation  - consider change to carvedilol from metoprolol tartrate  - Lasix 40 TID per nephrology     Dysphagia  Patient complains of dysphagia and associated 30 lb weight loss over the past 6 months      - EGD demonstrated no esophagitis or anatomic obstruction  - appreciate speech therapy recommendations  - dysphagia diet    Disposition: Continue inpatient      Subjective:   Patient seen and examined  He is currently undergoing dialysis  He is very concerned that he will not be receiving surgery  He states that he wants to proceed with either surgery or stenting  He states that he had not understood that he was expected to walk and participate in physical therapy even before surgery  He asks numerous times to speak with a surgeon or cardiologist   Otherwise, he states that he is still having shortness of breath but denies chest pain, fever, chills, cough, nausea, diarrhea  Vitals: Temp (24hrs), Av 9 °F (36 6 °C), Min:97 2 °F (36 2 °C), Max:98 1 °F (36 7 °C)  Current: Temperature: 98 1 °F (36 7 °C)  Vitals:    19 0642 19 0800 19 0808 19 0825   BP: 141/65  150/89 132/68   BP Location:   Right arm Left arm   Pulse:  87  88   Resp:   18 16   Temp:   98 1 °F (36 7 °C)    TempSrc:   Oral    SpO2:  97% 94%    Weight:       Height:        Body mass index is 23 44 kg/m²  I/O last 24 hours: In: 5893 [P O :1090; I V :200]  Out: 650 [Urine:650]      Physical Exam   Constitutional: He is oriented to person, place, and time  He appears well-developed and well-nourished  No distress  HENT:   Head: Normocephalic and atraumatic  Eyes: Conjunctivae and EOM are normal    Cardiovascular: Normal rate, regular rhythm and normal heart sounds  No murmur heard  Pulmonary/Chest: Effort normal and breath sounds normal  No respiratory distress  He has no rales  Abdominal: Soft  Bowel sounds are normal  There is no tenderness  Musculoskeletal: He exhibits no edema or deformity  Neurological: He is alert and oriented to person, place, and time  Skin: Skin is warm and dry  Psychiatric: He has a normal mood and affect   His behavior is normal    Upset about not receiving surgery but appropriate and conversant Invasive Devices     Peripheral Intravenous Line            Peripheral IV 01/13/19 Right Forearm 1 day          Hemodialysis Catheter            Permanent HD Catheter  10 days                          Labs:   Recent Results (from the past 24 hour(s))   Fingerstick Glucose (POCT)    Collection Time: 01/13/19 10:07 AM   Result Value Ref Range    POC Glucose 151 (H) 65 - 140 mg/dl   Fingerstick Glucose (POCT)    Collection Time: 01/13/19  4:03 PM   Result Value Ref Range    POC Glucose 251 (H) 65 - 140 mg/dl   Fingerstick Glucose (POCT)    Collection Time: 01/13/19  9:22 PM   Result Value Ref Range    POC Glucose 283 (H) 65 - 140 mg/dl   Creatinine clearance, urine, 24 hour (Do Not Order)    Collection Time: 01/14/19 12:58 AM   Result Value Ref Range    Creatinine, Ur 52 9 mg/dL    Creatinine Clearance 1 84 (L) 80 00 - 125 00 mL/min    Creatinine, 24H Ur 0 1 (L) 0 8 - 1 8 g/24Hr    TOTAL URINE VOLUME 200 ml   Basic metabolic panel    Collection Time: 01/14/19  4:45 AM   Result Value Ref Range    Sodium 127 (L) 136 - 145 mmol/L    Potassium 5 2 3 5 - 5 3 mmol/L    Chloride 90 (L) 100 - 108 mmol/L    CO2 33 (H) 21 - 32 mmol/L    ANION GAP 4 4 - 13 mmol/L    BUN 44 (H) 5 - 25 mg/dL    Creatinine 4 06 (H) 0 60 - 1 30 mg/dL    Glucose 106 65 - 140 mg/dL    Calcium 8 9 8 3 - 10 1 mg/dL    eGFR 14 ml/min/1 73sq m   CBC and differential    Collection Time: 01/14/19  4:45 AM   Result Value Ref Range    WBC 10 50 (H) 4 31 - 10 16 Thousand/uL    RBC 3 53 (L) 3 88 - 5 62 Million/uL    Hemoglobin 9 2 (L) 12 0 - 17 0 g/dL    Hematocrit 29 4 (L) 36 5 - 49 3 %    MCV 83 82 - 98 fL    MCH 26 1 (L) 26 8 - 34 3 pg    MCHC 31 3 (L) 31 4 - 37 4 g/dL    RDW 17 4 (H) 11 6 - 15 1 %    MPV 11 8 8 9 - 12 7 fL    Platelets 564 000 - 235 Thousands/uL    nRBC 0 /100 WBCs    Neutrophils Relative 75 43 - 75 %    Immat GRANS % 1 0 - 2 %    Lymphocytes Relative 14 14 - 44 %    Monocytes Relative 9 4 - 12 %    Eosinophils Relative 1 0 - 6 % Basophils Relative 0 0 - 1 %    Neutrophils Absolute 7 90 (H) 1 85 - 7 62 Thousands/µL    Immature Grans Absolute 0 05 0 00 - 0 20 Thousand/uL    Lymphocytes Absolute 1 48 0 60 - 4 47 Thousands/µL    Monocytes Absolute 0 97 0 17 - 1 22 Thousand/µL    Eosinophils Absolute 0 07 0 00 - 0 61 Thousand/µL    Basophils Absolute 0 03 0 00 - 0 10 Thousands/µL   Fingerstick Glucose (POCT)    Collection Time: 01/14/19  5:55 AM   Result Value Ref Range    POC Glucose 126 65 - 140 mg/dl       Radiology Results: I have personally reviewed pertinent reports  Other Diagnostic Testing:   I have personally reviewed pertinent reports          Active Meds:   Current Facility-Administered Medications   Medication Dose Route Frequency    acetaminophen (TYLENOL) tablet 650 mg  650 mg Oral Q6H PRN    aspirin chewable tablet 81 mg  81 mg Oral Daily    atorvastatin (LIPITOR) tablet 40 mg  40 mg Oral Daily With Dinner    b complex-vitamin C-folic acid (NEPHROCAPS) capsule 1 capsule  1 capsule Oral Daily With Dinner    benzonatate (TESSALON PERLES) capsule 100 mg  100 mg Oral TID    calcium carbonate (TUMS) chewable tablet 500 mg  500 mg Oral Daily PRN    chlorhexidine (PERIDEX) 0 12 % oral rinse 15 mL  15 mL Swish & Spit Q12H [address]    docusate sodium (COLACE) capsule 100 mg  100 mg Oral BID PRN    epoetin deepa (EPOGEN,PROCRIT) injection 2,000 Units  2,000 Units Intravenous After Dialysis    And    epoetin deepa (EPOGEN,PROCRIT) injection 3,000 Units  3,000 Units Intravenous After Dialysis    furosemide (LASIX) tablet 40 mg  40 mg Oral TID (diuretic)    heparin (porcine) subcutaneous injection 5,000 Units  5,000 Units Subcutaneous Q8H [address]    insulin glargine (LANTUS) subcutaneous injection 24 Units 0 24 mL  24 Units Subcutaneous HS    insulin lispro (HumaLOG) 100 units/mL subcutaneous injection 1-5 Units  1-5 Units Subcutaneous HS    insulin lispro (HumaLOG) 100 units/mL subcutaneous injection 1-6 Units  1-6 Units Subcutaneous TID AC    insulin lispro (HumaLOG) 100 units/mL subcutaneous injection 10 Units  10 Units Subcutaneous Daily With Dinner    insulin lispro (HumaLOG) 100 units/mL subcutaneous injection 8 Units  8 Units Subcutaneous BID before breakfast/lunch    lactulose 20 g/30 mL oral solution 10 g  10 g Oral Daily    melatonin tablet 3 mg  3 mg Oral HS    metoprolol tartrate (LOPRESSOR) partial tablet 12 5 mg  12 5 mg Oral Once    metoprolol tartrate (LOPRESSOR) tablet 25 mg  25 mg Oral Q12H JUAN C    nitroglycerin (TRIDIL) 3,500 mcg, verapamil (ISOPTIN) 7 5 mg in multi-electrolyte (ISOLYTE-S PH 7 4 equivalent) 500 mL IV bag   Irrigation Once    ondansetron (ZOFRAN) injection 4 mg  4 mg Intravenous Q6H PRN    pantoprazole (PROTONIX) EC tablet 40 mg  40 mg Oral Early Morning    polyethylene glycol (MIRALAX) packet 17 g  17 g Oral Daily PRN         VTE Pharmacologic Prophylaxis: Heparin  VTE Mechanical Prophylaxis: sequential compression device    Mario Montanez DO

## 2019-01-14 NOTE — PHYSICAL THERAPY NOTE
Physical Therapy Cancellation Note   The pt  Is off of the floor at hemodialysis  Will continue to follow      Melanie Goodell, PTA

## 2019-01-14 NOTE — HEMODIALYSIS
Patient received 3 hours of HD treatment via right IJ Perm Cath, 3000 ml fluid were removed without difficulties

## 2019-01-15 PROBLEM — Z99.2 END STAGE RENAL DISEASE ON DIALYSIS (HCC): Status: ACTIVE | Noted: 2018-12-31

## 2019-01-15 PROBLEM — E43 SEVERE PROTEIN-CALORIE MALNUTRITION (HCC): Status: ACTIVE | Noted: 2019-01-15

## 2019-01-15 PROBLEM — N18.6 END STAGE RENAL DISEASE ON DIALYSIS (HCC): Status: ACTIVE | Noted: 2018-12-31

## 2019-01-15 LAB
ANION GAP SERPL CALCULATED.3IONS-SCNC: 3 MMOL/L (ref 4–13)
BUN SERPL-MCNC: 27 MG/DL (ref 5–25)
CALCIUM SERPL-MCNC: 8.9 MG/DL (ref 8.3–10.1)
CHLORIDE SERPL-SCNC: 92 MMOL/L (ref 100–108)
CO2 SERPL-SCNC: 32 MMOL/L (ref 21–32)
CREAT SERPL-MCNC: 3.08 MG/DL (ref 0.6–1.3)
ERYTHROCYTE [DISTWIDTH] IN BLOOD BY AUTOMATED COUNT: 17.5 % (ref 11.6–15.1)
GFR SERPL CREATININE-BSD FRML MDRD: 19 ML/MIN/1.73SQ M
GLUCOSE SERPL-MCNC: 149 MG/DL (ref 65–140)
GLUCOSE SERPL-MCNC: 172 MG/DL (ref 65–140)
GLUCOSE SERPL-MCNC: 197 MG/DL (ref 65–140)
GLUCOSE SERPL-MCNC: 237 MG/DL (ref 65–140)
GLUCOSE SERPL-MCNC: 242 MG/DL (ref 65–140)
HCT VFR BLD AUTO: 27.6 % (ref 36.5–49.3)
HGB BLD-MCNC: 8.7 G/DL (ref 12–17)
MCH RBC QN AUTO: 26.4 PG (ref 26.8–34.3)
MCHC RBC AUTO-ENTMCNC: 31.5 G/DL (ref 31.4–37.4)
MCV RBC AUTO: 84 FL (ref 82–98)
PLATELET # BLD AUTO: 146 THOUSANDS/UL (ref 149–390)
PMV BLD AUTO: 11.7 FL (ref 8.9–12.7)
POTASSIUM SERPL-SCNC: 4.8 MMOL/L (ref 3.5–5.3)
RBC # BLD AUTO: 3.3 MILLION/UL (ref 3.88–5.62)
SODIUM SERPL-SCNC: 127 MMOL/L (ref 136–145)
WBC # BLD AUTO: 9.19 THOUSAND/UL (ref 4.31–10.16)

## 2019-01-15 PROCEDURE — 85027 COMPLETE CBC AUTOMATED: CPT | Performed by: INTERNAL MEDICINE

## 2019-01-15 PROCEDURE — 99232 SBSQ HOSP IP/OBS MODERATE 35: CPT | Performed by: INTERNAL MEDICINE

## 2019-01-15 PROCEDURE — 80048 BASIC METABOLIC PNL TOTAL CA: CPT | Performed by: INTERNAL MEDICINE

## 2019-01-15 PROCEDURE — 97116 GAIT TRAINING THERAPY: CPT

## 2019-01-15 PROCEDURE — 92526 ORAL FUNCTION THERAPY: CPT

## 2019-01-15 PROCEDURE — 82948 REAGENT STRIP/BLOOD GLUCOSE: CPT

## 2019-01-15 RX ORDER — INSULIN GLARGINE 100 [IU]/ML
24 INJECTION, SOLUTION SUBCUTANEOUS
Qty: 10 ML | Refills: 0 | Status: SHIPPED | OUTPATIENT
Start: 2019-01-15 | End: 2019-01-16 | Stop reason: HOSPADM

## 2019-01-15 RX ORDER — ISOSORBIDE MONONITRATE 30 MG/1
30 TABLET, EXTENDED RELEASE ORAL DAILY
Qty: 30 TABLET | Refills: 0 | Status: SHIPPED | OUTPATIENT
Start: 2019-01-16 | End: 2019-01-15

## 2019-01-15 RX ORDER — ISOSORBIDE MONONITRATE 30 MG/1
30 TABLET, EXTENDED RELEASE ORAL DAILY
Qty: 30 TABLET | Refills: 0 | Status: SHIPPED | OUTPATIENT
Start: 2019-01-16

## 2019-01-15 RX ORDER — ATORVASTATIN CALCIUM 40 MG/1
40 TABLET, FILM COATED ORAL DAILY
Qty: 30 TABLET | Refills: 0 | Status: SHIPPED | OUTPATIENT
Start: 2019-01-15

## 2019-01-15 RX ORDER — RANOLAZINE 500 MG/1
500 TABLET, EXTENDED RELEASE ORAL EVERY 12 HOURS SCHEDULED
Qty: 60 TABLET | Refills: 0 | Status: SHIPPED | OUTPATIENT
Start: 2019-01-15 | End: 2019-01-31 | Stop reason: SDUPTHER

## 2019-01-15 RX ORDER — CHOLECALCIFEROL (VITAMIN D3) 10 MCG
1 TABLET ORAL
Qty: 30 CAPSULE | Refills: 0 | Status: SHIPPED | OUTPATIENT
Start: 2019-01-15

## 2019-01-15 RX ORDER — HYDRALAZINE HYDROCHLORIDE 25 MG/1
25 TABLET, FILM COATED ORAL 3 TIMES DAILY
Qty: 90 TABLET | Refills: 0 | Status: SHIPPED | OUTPATIENT
Start: 2019-01-15

## 2019-01-15 RX ORDER — LANOLIN ALCOHOL/MO/W.PET/CERES
3 CREAM (GRAM) TOPICAL
Qty: 30 TABLET | Refills: 0 | Status: SHIPPED | OUTPATIENT
Start: 2019-01-15

## 2019-01-15 RX ORDER — DOCUSATE SODIUM 100 MG/1
100 CAPSULE, LIQUID FILLED ORAL 2 TIMES DAILY PRN
Qty: 10 CAPSULE | Refills: 0 | Status: SHIPPED | OUTPATIENT
Start: 2019-01-15

## 2019-01-15 RX ORDER — FUROSEMIDE 40 MG/1
40 TABLET ORAL
Qty: 90 TABLET | Refills: 0 | Status: SHIPPED | OUTPATIENT
Start: 2019-01-15

## 2019-01-15 RX ORDER — PANTOPRAZOLE SODIUM 40 MG/1
40 TABLET, DELAYED RELEASE ORAL DAILY
Qty: 30 TABLET | Refills: 0 | Status: SHIPPED | OUTPATIENT
Start: 2019-01-15

## 2019-01-15 RX ADMIN — INSULIN LISPRO 2 UNITS: 100 INJECTION, SOLUTION INTRAVENOUS; SUBCUTANEOUS at 21:07

## 2019-01-15 RX ADMIN — METOPROLOL TARTRATE 25 MG: 25 TABLET, FILM COATED ORAL at 21:07

## 2019-01-15 RX ADMIN — INSULIN LISPRO 3 UNITS: 100 INJECTION, SOLUTION INTRAVENOUS; SUBCUTANEOUS at 11:37

## 2019-01-15 RX ADMIN — PANTOPRAZOLE SODIUM 40 MG: 40 TABLET, DELAYED RELEASE ORAL at 06:32

## 2019-01-15 RX ADMIN — BENZONATATE 100 MG: 100 CAPSULE ORAL at 08:07

## 2019-01-15 RX ADMIN — ISOSORBIDE MONONITRATE 30 MG: 30 TABLET, EXTENDED RELEASE ORAL at 08:08

## 2019-01-15 RX ADMIN — INSULIN GLARGINE 24 UNITS: 100 INJECTION, SOLUTION SUBCUTANEOUS at 21:07

## 2019-01-15 RX ADMIN — HEPARIN SODIUM 5000 UNITS: 5000 INJECTION INTRAVENOUS; SUBCUTANEOUS at 13:29

## 2019-01-15 RX ADMIN — ATORVASTATIN CALCIUM 40 MG: 40 TABLET, FILM COATED ORAL at 17:23

## 2019-01-15 RX ADMIN — HYDRALAZINE HYDROCHLORIDE 25 MG: 25 TABLET ORAL at 06:31

## 2019-01-15 RX ADMIN — BENZONATATE 100 MG: 100 CAPSULE ORAL at 17:23

## 2019-01-15 RX ADMIN — METOPROLOL TARTRATE 25 MG: 25 TABLET, FILM COATED ORAL at 08:07

## 2019-01-15 RX ADMIN — RANOLAZINE 500 MG: 500 TABLET, FILM COATED, EXTENDED RELEASE ORAL at 21:07

## 2019-01-15 RX ADMIN — BENZONATATE 100 MG: 100 CAPSULE ORAL at 21:07

## 2019-01-15 RX ADMIN — RANOLAZINE 500 MG: 500 TABLET, FILM COATED, EXTENDED RELEASE ORAL at 08:07

## 2019-01-15 RX ADMIN — ASPIRIN 81 MG 81 MG: 81 TABLET ORAL at 08:07

## 2019-01-15 RX ADMIN — HEPARIN SODIUM 5000 UNITS: 5000 INJECTION INTRAVENOUS; SUBCUTANEOUS at 06:31

## 2019-01-15 RX ADMIN — FUROSEMIDE 40 MG: 40 TABLET ORAL at 17:23

## 2019-01-15 RX ADMIN — FUROSEMIDE 40 MG: 40 TABLET ORAL at 11:37

## 2019-01-15 RX ADMIN — MELATONIN 3 MG: at 21:07

## 2019-01-15 RX ADMIN — LACTULOSE 10 G: 10 SOLUTION ORAL at 08:07

## 2019-01-15 RX ADMIN — HYDRALAZINE HYDROCHLORIDE 25 MG: 25 TABLET ORAL at 21:07

## 2019-01-15 RX ADMIN — ACETAMINOPHEN 650 MG: 325 TABLET, FILM COATED ORAL at 18:07

## 2019-01-15 RX ADMIN — FUROSEMIDE 40 MG: 40 TABLET ORAL at 06:31

## 2019-01-15 RX ADMIN — HEPARIN SODIUM 5000 UNITS: 5000 INJECTION INTRAVENOUS; SUBCUTANEOUS at 21:07

## 2019-01-15 RX ADMIN — INSULIN LISPRO 1 UNITS: 100 INJECTION, SOLUTION INTRAVENOUS; SUBCUTANEOUS at 06:32

## 2019-01-15 RX ADMIN — Medication 1 CAPSULE: at 17:23

## 2019-01-15 NOTE — SOCIAL WORK
CM confirmed pt has 7:00AM chair time on Tu/Th/Sat schedule at [place] [id number] located in [facility] for his o/p Hemodialysis (HD) needs  CM to follow

## 2019-01-15 NOTE — SPEECH THERAPY NOTE
Speech Language/Pathology    Speech/Language Pathology Progress Note    Patient Name: Evelyn Camejo  RRAPG'X Date: 1/15/2019      Subjective:  "He only eats the soft foods "  Family reports poor po intake  Pt states he can't swallow hard foods because of "slime" in his throat  Objective:  Pt seen for f/u dysphagia tx  Current diet: mechanical soft level 2 with thin liquids  Chart reviewed  Pt opting for d/c home tomorrow with [facility] and OP HDD  Continues to prefer liquids vs solids  Declines advanced textures  Educational material provided and reviewed with pt's wife, son, and daughter in law with whom he lives  Reviewed level 2 diet and provided instructions for appropriate diet modifications  Included information regarding pureeing foods as pt seems to prefer these  All questions answered  Assessment:  Educational material provided and reviewed for appropriate diet modifications in anticipation of dc home tomorrow  Plan/Recommendations:  Continue a dysphagia level 2 diet with thin liquids  Puree as desired

## 2019-01-15 NOTE — SOCIAL WORK
Pt  Wants to go home, refusing rehab  Family agreeable if pt continues HD at [facility] in [facility]  Has chair on T-T-S  Referral made to [facility] who cover in his area for nsg and home PT  Plan d/c tomorrow with family

## 2019-01-15 NOTE — PHYSICAL THERAPY NOTE
Physical Therapy Progress Note    Zena Claude, PTA        01/15/19 1258   Pain Assessment   Pain Assessment No/denies pain   Pain Score No Pain   Restrictions/Precautions   Other Precautions Cardiac/sternal;Telemetry   General   Chart Reviewed Yes   Response to Previous Treatment Patient with no complaints from previous session  Family/Caregiver Present Yes   Cognition   Arousal/Participation Alert   Following Commands Follows one step commands without difficulty   Subjective   Subjective reluctant to walk  but  c encouragement from  family    agreed   Bed Mobility   Supine to Sit 3  Moderate assistance   Additional items Assist x 1;HOB elevated; Increased time required;Verbal cues   Transfers   Sit to Stand 4  Minimal assistance   Additional items Assist x 1; Increased time required; Other   Stand to Sit 4  Minimal assistance   Additional items Assist x 1; Increased time required;Verbal cues   Ambulation/Elevation   Gait pattern Narrow MARIA E; Decreased foot clearance; Short stride; Excessively slow   Gait Assistance 4  Minimal assist   Additional items Assist x 1;Verbal cues; Tactile cues   Assistive Device Rolling walker   Distance 70ft x2  (standing  rest )   Balance   Static Sitting Fair +   Static Standing Fair -   Ambulatory Poor +   Endurance Deficit   Endurance Deficit Yes   Endurance Deficit Description fatgiue   Activity Tolerance   Activity Tolerance Patient limited by fatigue   Nurse Made Aware yes   Assessment   Prognosis Guarded   Problem List Decreased strength;Decreased endurance; Impaired balance;Decreased mobility   Assessment pt  requires  much  encouragement  from family to moiblize  he did require  mod A  for bed mob  but  than min A fro  upright  activity  he did amb 70 ft x2 ,needing a stnading  rest  to "catch breath"  no  obvious  SOB  noted   pt  does tire  very quickly   he was left in  recliner  all needs in hand ,  family pleased that he  walked   Sherri Prows  pt will  benefit  from  cont PT intervention    Barriers to Discharge Inaccessible home environment   Goals   Patient Goals stay in bed    STG Expiration Date 01/21/19   Treatment Day 2   Plan   Treatment/Interventions Functional transfer training;Patient/family training;Bed mobility;Gait training;Spoke to nursing   Progress Progressing toward goals   PT Frequency (3-5xwk)   Recommendation   Recommendation Short-term skilled PT   Equipment Recommended Obie Castleman

## 2019-01-15 NOTE — PLAN OF CARE
Problem: PHYSICAL THERAPY ADULT  Goal: Performs mobility at highest level of function for planned discharge setting  See evaluation for individualized goals  Treatment/Interventions: Therapeutic exercise, Endurance training, gait training, transfer training, elevations, Cognitive reorientation, Bed mobility, Spoke to nursing, Spoke to [place];       See flowsheet documentation for full assessment, interventions and recommendations  Outcome: Progressing  Prognosis: Guarded  Problem List: Decreased strength, Decreased endurance, Impaired balance, Decreased mobility  Assessment: pt  requires  much  encouragement  from family to moiblize  he did require  mod A  for bed mob  but  than min A fro  upright  activity  he did amb 70 ft x2 ,needing a stnading  rest  to "catch breath"  no  obvious  SOB  noted   pt  does tire  very quickly   he was left in  recliner  all needs in hand ,  family pleased that he  walked   Kaycee Flor pt will  benefit  from  cont PT intervention   Barriers to Discharge: Inaccessible home environment     Recommendation: Short-term skilled PT          See flowsheet documentation for full assessment

## 2019-01-15 NOTE — DISCHARGE SUMMARY
IMR Discharge Summary - Medical Ila Pittslist 72 y o  male MRN: [id number]    [address] PPHP 4 MED SURG/SD Room / Bed: PPHP 403/[facility] 403-01 Encounter: [id number]    BRIEF OVERVIEW    Admitting Provider: Veda Fay MD  Discharge Provider: Veda Fay MD  Primary Care Physician at Discharge:  [facility]    Discharge To: Home with home health      Admission Date: 1/8/2019     Discharge Date: 1/16/2019    Primary Discharge Diagnosis  Principal Problem:    MI, acute, non ST segment elevation (HCC)  Active Problems:    End stage renal disease on dialysis ([facility] [address] )    Acute on chronic congestive heart failure (HCC)    Type 2 diabetes mellitus with kidney complication, with long-term current use of insulin (HCC)    Dysphagia    Weight loss    Coronary artery disease involving native coronary artery    Disease of cardiovascular system    Severe protein-calorie malnutrition ([facility] [address] )  Resolved Problems:    * No resolved hospital problems  *      Other Problems Addressed:  None    Consulting Providers   Cardiothoracic surgery, cardiology, Gastroenterology, Nephrology       Therapeutic Operative Procedures Performed  None    Diagnostic Procedures Performed    Cardiac catheterization 1/7/19 (at [facility]):  SUMMARY     CORONARY CIRCULATION:  Distal left main: There was a tubular 50 % stenosis  Proximal LAD: There was a tubular 90 % stenosis  2nd diagonal: The vessel was small sized  Angiography showed minor luminal irregularities  Ostial circumflex: The vessel was large sized (dominant)  2nd obtuse marginal: The vessel was small sized  Angiography showed mild atherosclerosis  3rd obtuse marginal: The vessel was small sized  Angiography showed mild atherosclerosis    Left posterior descending artery: There was a 90 % stenosis      HEMODYNAMICS:  Hemodynamic assessment demonstrated mildly to moderately elevated LVEDP      RECOMMENDATIONS:  Consultation with a cardiac surgeon should be obtained for coronary artery bypass grafting  EGD 01/10/2019  FINDINGS:     #1  Esophagus- normal esophagus and GE junction  No stricture or esophagitis noted     #2  Stomach- mild erythema in the body of the stomach otherwise normal     #3  Duodenum- normal bulb and 2nd portion of the duodenum         IMPRESSIONS:       Mild gastritis in the body of the stomach otherwise normal EGD     RECOMMENDATIONS:      Continue Protonix  Can proceed with planned MARQUISE    MARQUISE 1/10/19: SUMMARY     LEFT VENTRICLE:  Size was normal   Systolic function was markedly reduced  Ejection fraction was estimated to be 30 %  There was severe diffuse hypokinesis with regional variations  No evidence of apical thrombus      RIGHT VENTRICLE:  Systolic function was normal      LEFT ATRIUM:  The atrium was mildly dilated      LEFT ATRIAL APPENDAGE:  No thrombus was identified      ATRIAL SEPTUM:  No defect or patent foramen ovale was identified      MITRAL VALVE:  There was no evidence for stenosis  There was moderate regurgitation  The effective regurgitant orifice area (EROA) by PISA method was 28 mm2, with a regurgitant volume of 39 ml  The regurgitant jet was centrally directed      TRICUSPID VALVE:  There was moderate regurgitation  Estimated peak PA pressure was 50 mmHg  The findings suggest moderate pulmonary hypertension      AORTA:  There was mild atheroma in the proximal descending aorta      PULMONARY VEINS:  There was systolic blunting in the pulmonary vein(s)  Discharge Disposition: [facility]  Discharged With Lines: Permacath for HD        Test Results Pending at Discharge: None    Outpatient Follow-Up  Patient will need to follow with CT surgery to reevaluate if he may be a candidate for CABG  He will need to establish PCP  He will need to follow with nephrology as well     Follow up with consulting providers  CT surgery, cardiology, nephrology  Active Issues Requiring Follow-up   End stage renal disease, multivessel CAD    Code Status: Level 1 - Full Code    Medications   See after visit summary for reconciled discharge medications provided to patient and family      STOP taking these medications     STOP taking these medications    METOPROLOL SUCCINATE PO     sodium bicarbonate 325 MG tablet     torsemide 20 mg tablet   Commonly known as: DEMADEX    TAKE these medications     TAKE these medications     Morning Afternoon Evening Bedtime As Needed    aspirin 81 mg chewable tablet   Chew 81 mg daily   Refills: 0                    atorvastatin 40 mg tablet   Commonly known as: LIPITOR   Take 1 tablet (40 mg total) by mouth daily   Refills: 0   What changed:   0 medication strength   0 how much to take   0 when to take this                    b complex-vitamin C-folic acid 1 mg capsule   Take 1 capsule by mouth daily with dinner   Refills: 0                    benzonatate 100 mg capsule   Commonly known as: TESSALON PERLES   Take 100 mg by mouth   Refills: 0                    docusate sodium 100 mg capsule   Commonly known as: COLACE   Take 1 capsule (100 mg total) by mouth 2 (two) times a day as needed for constipation   Refills: 0                    furosemide 40 mg tablet   Commonly known as: LASIX   Take 1 tablet (40 mg total) by mouth 3 (three) times a day   Refills: 0                    glucose blood test strip   Commonly known as: RELION CONFIRM/MICRO TEST   Use as instructed   Refills: 0                    hydrALAZINE 25 mg tablet   Commonly known as: APRESOLINE   Take 1 tablet (25 mg total) by mouth 3 (three) times a day   Refills: 0                    insulin glargine 100 units/mL subcutaneous injection   Commonly known as: LANTUS   Inject 24 Units under the skin daily at bedtime   Refills: 0                    * insulin lispro 100 units/mL injection   Commonly known as: HumaLOG   Inject 10 Units under the skin daily with dinner   Refills: 0                    * insulin lispro 100 units/mL injection   Commonly known as: HumaLOG   Start taking on: 1/16/2019   Inject 8 Units under the skin 2 (two) times a day before breakfast and lunch   Refills: 0                    isosorbide mononitrate 30 mg 24 hr tablet   Commonly known as: IMDUR   Start taking on: 1/16/2019   Take 1 tablet (30 mg total) by mouth daily   Refills: 0                    melatonin 3 mg   Take 1 tablet (3 mg total) by mouth daily at bedtime   Refills: 0                    metoprolol tartrate 25 mg tablet   Commonly known as: LOPRESSOR   Take 1 tablet (25 mg total) by mouth every 12 (twelve) hours   Refills: 0                    pantoprazole 40 mg tablet   Commonly known as: PROTONIX   Take 1 tablet (40 mg total) by mouth daily   Refills: 0   What changed: when to take this                    ranolazine 500 mg 12 hr tablet   Commonly known as: RANEXA   Take 1 tablet (500 mg total) by mouth every 12 (twelve) hours   Refills: 0                         Allergies  No Known Allergies  Discharge Diet: renal diet  Activity restrictions: none    [address] [facility] [address]  Per Dr Michelle Race H&P: "[Patient] presented [at [facility]] due to worsening shortness of breath, lower extremity edema and orthopnea  He said he had recent hospitalization prior to this said [facility] for congestive heart failure  Was discharged on diuretics but his symptoms did not improve  On admission there his creatinine was noted to be significantly elevated with elevated troponin  His symptoms were most likely from congestive heart failure and possible NSTEMI  He was started on IV diuretics  Because of his worsening creatinine despite diuresis, patient was started on hemodialysis 1/14/2019  After this patient underwent cardiac catheterization that showed multivessel coronary artery disease    So it was recommended to transfer the patient here for CT surgery evaluation for CABG"     While he was admitted, patient was continued on hemodialysis Monday Wednesday Friday  He complained of dysphagia for the past 6 months leading to a 30 lb weight loss, so he required EGD before he could receive MARQUISE as part of his CABG workup  EGD demonstrated no anatomic abnormality or esophagitis  CABG workup proceeded and surgery was scheduled however the patient was persistently not participating in physical therapy, not ambulating, and not taking adequate food intake so he was deemed to be a poor surgical candidate at this time and the surgery was canceled  He was reevaluated by interventional Cardiology who determined that his lesions were not amenable to stenting  He is medically optimized with aspirin, beta-blocker, statin, hydralazine, Imdur, and Ranexa  If the patient participates in physical therapy and becomes stronger, he may be a candidate for CABG so should be reassessed at a later time  He is also discharged on furosemide 3 times daily for his CHF and end-stage renal disease  Near the end of his admission, the patient expressed desire to discontinue dialysis  Extensive conversations were had with patient and family explaining that his prognosis while off dialysis is extremely poor he likely would not survive more than a month  The official recommendation from PT and OT was that patient go to short-term rehab  Patient is unwilling to go to another facility so he will be discharged home with home health  He will receive dialysis near where he lives      Presenting Problem/History of Present Illness  Principal Problem:    MI, acute, non ST segment elevation (HCC)  Active Problems:    End stage renal disease on dialysis (HCC)    Acute on chronic congestive heart failure (HCC)    Type 2 diabetes mellitus with kidney complication, with long-term current use of insulin (HCC)    Dysphagia    Weight loss    Coronary artery disease involving native coronary artery    Disease of cardiovascular system    Severe protein-calorie malnutrition [facility])  Resolved Problems:    * No resolved hospital problems  *        Discharge Condition: stable  Coronary artery disease  Patient with multivessel disease; not appropriate for CABG given poor participation in care/deconditioning  - Per cardiology, pt not a candidate for stenting  - CT surgery to reassess later  - Patient saying that he wants to go home     Type 2 diabetes  Hemoglobin A1c of 8 2 on presentation   Appreciate endocrinology recommendations - now on 24 units long-acting, 8 units with breakfast and lunch, 10 units with dinner  Will discharge on this regimen      Acute kidney injury  Baseline creatinine unknown  creatinine of 4 0 today  has been undergoing hemodialysis  - hemodialysis Monday Wednesday Friday with volume removal  - hyponatremia persistent at 127, hyperkalemia improved at 4 8  - Patient scheduled for outpatient dialysis on a TTS schedule     Congestive heart failure  Acute on chronic congestive heart failure with an EF of 35 on recent echo  - MARQUISE demonstrates EF of 30%, diffuse hypokinesis, moderate mitral regurgitation  - Lasix 40 TID per nephrology  - Per cardiology:  start hydralazine, Imdur, Ranexa; continue ASA, statin, BB     Dysphagia  Patient complains of dysphagia and associated 30 lb weight loss over the past 6 months      - EGD demonstrated no esophagitis or anatomic obstruction  - appreciate speech therapy recommendations  - dysphagia diet    Discharge  Statement   I spent 30 minutes minutes discharging the patient  This time was spent on the day of discharge  I had direct contact with the patient on the day of discharge  Additional documentation is required if more than 30 minutes were spent on discharge

## 2019-01-15 NOTE — PROGRESS NOTES
Interval progress note:  Patient has expressed to multiple providers so far today that he no longer wishes to continue with dialysis  Conversation held with Dr David Smith, patient, patient's wife, patient's son, and patient's daughter-in-law  It was explained clearly to the patient and his family that without dialysis, his life expectancy is a month at best, likely just 2 weeks, possibly more if he does make urine  It was explained that without urine output, he will soon become symptomatic and will require help from his family and hospice services  The options were presented to the patient as follows:  He could follow the official recommendation of going to SNF where he would undergo rehab, outpatient dialysis, and be reassessed by cardiothoracic surgery for possible CABG  The 2nd option would be to discontinue dialysis, not pursue therapy, and go home with hospice  Alternatively, he has the option to elect to receive home health services and continue outpatient dialysis  The patient's family is adamantly against hospice for the patient, as they want him to work with rehabilitation and be reassessed for CABG  The patient still wants to receive CABG but is very exhausted with what is required of him before that so is unsure if he can tolerate it  The conclusion was reached that the patient would like to go home with home health and continue with dialysis on an outpatient basis near where he lives in the [facility]  Since he has Medicare and has been inpatient >3 midnights, he may also be eligible to go to SNF within the next 30 days should his situation and/or goals change  Per Dr Bobby Hong, the patient is stable to receive his next dialysis treatment on Thursday 1/17 if he is scheduled to start a TTS schedule  If it will be a MWF schedule he will need to receive one more treatment inpatient before discharge tomorrow  Case management updated

## 2019-01-15 NOTE — PROGRESS NOTES
IM Residency Progress Note   Unit/Bed#: [facility] 403-01 Encounter: [id number]  SOD Team A      Jasson Méndezist 72 y o  male [address] Stay Days: 7      Assessment/Plan:    Principal Problem:    MI, acute, non ST segment elevation ([facility] [address] )  Active Problems:    NATALIIA (acute kidney injury) ([facility] [address] )    Acute on chronic congestive heart failure (HCC)    Type 2 diabetes mellitus with kidney complication, with long-term current use of insulin (HCC)    Dysphagia    Weight loss    Coronary artery disease involving native coronary artery    Disease of cardiovascular system    Coronary artery disease  Patient with multivessel disease; not appropriate for CABG given poor participation in care/deconditioning  - Per cardiology, pt not a candidate for stenting  - CT surgery to reassess later this week  - Patient saying that he wants to go home     Type 2 diabetes  Hemoglobin A1c of 8 2 on presentation   Appreciate endocrinology recommendations - now on 24 units long-acting, 8 units with breakfast and lunch, 10 units with dinner, sliding scale algorithm 3 for t i d, algorithm 2 for QHS  - Will confirm outpatient regimen; A1c is relatively low but given his significant requirements here he may need insulin     Acute kidney injury  Baseline creatinine unknown  creatinine of 4 0 today  has been undergoing hemodialysis  - hemodialysis Monday Wednesday Friday with volume removal  - hyponatremia persistent at 127, hyperkalemia improved at 4 8  - Patient scheduled for outpatient dialysis Wednesday and Friday     Congestive heart failure  Acute on chronic congestive heart failure with an EF of 35 on recent echo  - MARQUISE demonstrates EF of 30%, diffuse hypokinesis, moderate mitral regurgitation  - Lasix 40 TID per nephrology  - Per cardiology:  start hydralazine, Imdur, Ranexa    Dysphagia  Patient complains of dysphagia and associated 30 lb weight loss over the past 6 months      - EGD demonstrated no esophagitis or anatomic obstruction  - appreciate speech therapy recommendations  - dysphagia diet    Disposition: Skilled short-term rehab per PT/OT, though patient refusing  Pt established for outpatient dialysis (though he may not go)  Will need to confirm patient can get medications, and see if he needs O2 with ambulation  Has Medicare  Subjective:   Patient seen and examined  He complains of continued shortness of breath and says that his supplemental O2 is "the only thing keeping me alive " He expresses multiple times that he does not want to stay in the hospital and does not want to go to a nursing facility  He says "if I am not getting surgery or stenting then I want to go home " He wants to go back to [facility] and does not want to continue with dialysis  He states that he understands that his kidneys will not get better and that his shortness of breath will likely get worse off dialysis  He says that he understands he will get worse, but that he will not return to the hospital since he plans to fly back to [place] as soon as he can  He denies fever or chills  He complains of continued SOB as above  Vitals: Temp (24hrs), Av 2 °F (36 8 °C), Min:97 8 °F (36 6 °C), Max:99 °F (37 2 °C)  Current: Temperature: 99 °F (37 2 °C)  Vitals:    01/15/19 0000 01/15/19 0311 01/15/19 0631 01/15/19 0640   BP: 91/54 125/69 133/59    BP Location: Right arm Right arm     Pulse: 79 80     Resp: 18 18     Temp: 98 4 °F (36 9 °C) 99 °F (37 2 °C)     TempSrc: Oral Oral     SpO2: 99% 100%     Weight:    62 2 kg (137 lb 2 oz)   Height:        Body mass index is 22 82 kg/m²  I/O last 24 hours: In: 2297 [P O :1120; I V :500]  Out: 4685 [Urine:400; Other:3504]      Physical Exam   Constitutional: He is oriented to person, place, and time  He appears well-developed and well-nourished  No distress  HENT:   Head: Normocephalic and atraumatic     Eyes: Conjunctivae and EOM are normal    Cardiovascular: Normal rate, regular rhythm and normal heart sounds  No murmur heard  Pulmonary/Chest: No respiratory distress  He has no wheezes  He has no rales  Breath sounds diminished  Poor inspiratory effort  Abdominal: Soft  Bowel sounds are normal  There is no tenderness  Musculoskeletal: He exhibits no edema or deformity  Neurological: He is alert and oriented to person, place, and time  Skin: Skin is warm and dry  Psychiatric: He has a normal mood and affect   His behavior is normal        Invasive Devices     Peripheral Intravenous Line            Peripheral IV 01/13/19 Right Forearm 2 days          Hemodialysis Catheter            Permanent HD Catheter  10 days                          Labs:   Recent Results (from the past 24 hour(s))   Fingerstick Glucose (POCT)    Collection Time: 01/14/19 12:19 PM   Result Value Ref Range    POC Glucose 141 (H) 65 - 140 mg/dl   Fingerstick Glucose (POCT)    Collection Time: 01/14/19  3:59 PM   Result Value Ref Range    POC Glucose 245 (H) 65 - 140 mg/dl   Fingerstick Glucose (POCT)    Collection Time: 01/14/19  8:52 PM   Result Value Ref Range    POC Glucose 191 (H) 65 - 140 mg/dl   Basic metabolic panel    Collection Time: 01/15/19  4:47 AM   Result Value Ref Range    Sodium 127 (L) 136 - 145 mmol/L    Potassium 4 8 3 5 - 5 3 mmol/L    Chloride 92 (L) 100 - 108 mmol/L    CO2 32 21 - 32 mmol/L    ANION GAP 3 (L) 4 - 13 mmol/L    BUN 27 (H) 5 - 25 mg/dL    Creatinine 3 08 (H) 0 60 - 1 30 mg/dL    Glucose 149 (H) 65 - 140 mg/dL    Calcium 8 9 8 3 - 10 1 mg/dL    eGFR 19 ml/min/1 73sq m   CBC    Collection Time: 01/15/19  4:47 AM   Result Value Ref Range    WBC 9 19 4 31 - 10 16 Thousand/uL    RBC 3 30 (L) 3 88 - 5 62 Million/uL    Hemoglobin 8 7 (L) 12 0 - 17 0 g/dL    Hematocrit 27 6 (L) 36 5 - 49 3 %    MCV 84 82 - 98 fL    MCH 26 4 (L) 26 8 - 34 3 pg    MCHC 31 5 31 4 - 37 4 g/dL    RDW 17 5 (H) 11 6 - 15 1 %    Platelets 850 (L) 521 - 390 Thousands/uL    MPV 11 7 8 9 - 12 7 fL   Fingerstick Glucose (POCT) Collection Time: 01/15/19  5:57 AM   Result Value Ref Range    POC Glucose 172 (H) 65 - 140 mg/dl       Radiology Results: I have personally reviewed pertinent reports  Other Diagnostic Testing:   I have personally reviewed pertinent reports          Active Meds:   Current Facility-Administered Medications   Medication Dose Route Frequency    acetaminophen (TYLENOL) tablet 650 mg  650 mg Oral Q6H PRN    aspirin chewable tablet 81 mg  81 mg Oral Daily    atorvastatin (LIPITOR) tablet 40 mg  40 mg Oral Daily With Dinner    b complex-vitamin C-folic acid (NEPHROCAPS) capsule 1 capsule  1 capsule Oral Daily With Dinner    benzonatate (TESSALON PERLES) capsule 100 mg  100 mg Oral TID    calcium carbonate (TUMS) chewable tablet 500 mg  500 mg Oral Daily PRN    chlorhexidine (PERIDEX) 0 12 % oral rinse 15 mL  15 mL Swish & Spit Q12H [address]    docusate sodium (COLACE) capsule 100 mg  100 mg Oral BID PRN    epoetin deepa (EPOGEN,PROCRIT) injection 2,000 Units  2,000 Units Intravenous After Dialysis    And    epoetin deepa (EPOGEN,PROCRIT) injection 3,000 Units  3,000 Units Intravenous After Dialysis    furosemide (LASIX) tablet 40 mg  40 mg Oral TID (diuretic)    heparin (porcine) subcutaneous injection 5,000 Units  5,000 Units Subcutaneous Q8H [address]    hydrALAZINE (APRESOLINE) tablet 25 mg  25 mg Oral Q8H [address]    insulin glargine (LANTUS) subcutaneous injection 24 Units 0 24 mL  24 Units Subcutaneous HS    insulin lispro (HumaLOG) 100 units/mL subcutaneous injection 1-5 Units  1-5 Units Subcutaneous HS    insulin lispro (HumaLOG) 100 units/mL subcutaneous injection 1-6 Units  1-6 Units Subcutaneous TID AC    insulin lispro (HumaLOG) 100 units/mL subcutaneous injection 10 Units  10 Units Subcutaneous Daily With Dinner    insulin lispro (HumaLOG) 100 units/mL subcutaneous injection 8 Units  8 Units Subcutaneous BID before breakfast/lunch    isosorbide mononitrate (IMDUR) 24 hr tablet 30 mg  30 mg Oral Daily    lactulose 20 g/30 mL oral solution 10 g  10 g Oral Daily    melatonin tablet 3 mg  3 mg Oral HS    metoprolol tartrate (LOPRESSOR) partial tablet 12 5 mg  12 5 mg Oral Once    metoprolol tartrate (LOPRESSOR) tablet 25 mg  25 mg Oral Q12H [address]    nitroglycerin (TRIDIL) 3,500 mcg, verapamil (ISOPTIN) 7 5 mg in multi-electrolyte (ISOLYTE-S PH 7 4 equivalent) 500 mL IV bag   Irrigation Once    ondansetron (ZOFRAN) injection 4 mg  4 mg Intravenous Q6H PRN    pantoprazole (PROTONIX) EC tablet 40 mg  40 mg Oral Early Morning    polyethylene glycol (MIRALAX) packet 17 g  17 g Oral Daily PRN    ranolazine (RANEXA) 12 hr tablet 500 mg  500 mg Oral Q12H [address]         VTE Pharmacologic Prophylaxis: Heparin  VTE Mechanical Prophylaxis: sequential compression device    Adelina Davis DO

## 2019-01-15 NOTE — PROGRESS NOTES
General Cardiology   Progress Note -  Team One   Brandi Rocha 72 y o  male MRN: [id number]    Unit/Bed#: [facility] 403-01 Encounter: [id number]    Assessment/ Plan    1  Elevated troponin in the setting of CHF   cardiac cath 1/7/19 showing multivessel CAD  Transferred from [facility]  CT surgery evaluated patient and deemed inappropriate for CABG due to patient refusing PT, walking, food and multiple co morbidities    Continue medical management  No PCI options per interventional cardiology   On aspirin, statin, BB, imdur and ranexa     2  Acute systolic heart failure with Ischemic cardiomyopathy   EF 30%  On furosemide 40 mg PO TID (diuretics per primary team)     3  NATALIIA on CKD that progressed into ESRD- followed by nephrology   HD yesterday   Patient does not want to continue dialysis  Patient will discuss with family regarding plan of care    4  Moderate MR- MARQUISE reviewed showing moderate MR 1/10/19    Subjective  Patient is depressed and feels hopeless  He denies chest pain, SOB or palpitations currently  No fever or chills  He reports he does not want dialysis anymore  He wants to talk to family today about his decision and go home  Review of Systems   Constitution: Negative for chills and fever  Cardiovascular: Negative for chest pain, dyspnea on exertion, leg swelling and orthopnea  Respiratory: Negative for shortness of breath  Musculoskeletal: Negative for falls  Gastrointestinal: Negative for nausea and vomiting  Neurological: Negative for dizziness and light-headedness  Psychiatric/Behavioral: Negative for altered mental status  Objective:   Vitals: Blood pressure 138/69, pulse 90, temperature 97 8 °F (36 6 °C), temperature source Oral, resp  rate 18, height 5' 5" (1 651 m), weight 62 2 kg (137 lb 2 oz), SpO2 100 %  ,       Body mass index is 22 82 kg/m²  ,     Systolic (05JWJ), DCO:877 , Min:91 , AQN:702     Diastolic (34KGC), WTM:66, Min:54, Max:69      Intake/Output Summary (Last 24 hours) at 01/15/19 1132  Last data filed at 01/15/19 0753   Gross per 24 hour   Intake             1060 ml   Output              400 ml   Net              660 ml     Weight (last 2 days)     Date/Time   Weight    01/15/19 0640  62 2 (137 13)    01/14/19 0600  63 9 (140 87)    01/13/19 0615  63 4 (139 77)            Telemetry Review: Normal sinus rhythm HR 80s    Physical Exam   Constitutional: He is oriented to person, place, and time  No distress  Neck: Neck supple  Cardiovascular: Normal rate, regular rhythm, normal heart sounds and intact distal pulses  Pulmonary/Chest: Effort normal  No respiratory distress  He has no wheezes  2 L NC  No crackles   Poor effort    Abdominal: Soft  Bowel sounds are normal    Musculoskeletal: He exhibits no edema  Neurological: He is alert and oriented to person, place, and time  Skin: Skin is warm and dry  He is not diaphoretic     Psychiatric:   Depressed        LABORATORY RESULTS      CBC with diff:   Results from last 7 days  Lab Units 01/15/19  0447 01/14/19  0445 01/13/19  0449 01/12/19  0446 01/11/19  0455 01/10/19  0442 01/09/19  1400 01/09/19  0356   WBC Thousand/uL 9 19 10 50* 8 81 9 48 8 43 9 93 10 13 9 61   HEMOGLOBIN g/dL 8 7* 9 2* 9 1* 9 7* 9 7* 9 7* 9 2* 9 6*   HEMATOCRIT % 27 6* 29 4* 29 2* 31 8* 31 4* 31 5* 29 1* 30 7*   MCV fL 84 83 83 85 85 84 82 83   PLATELETS Thousands/uL 146* 185 139* 120* 137* 118* 138* 125*   MCH pg 26 4* 26 1* 26 0* 25 8* 26 1* 25 9* 26 1* 26 1*   MCHC g/dL 31 5 31 3* 31 2* 30 5* 30 9* 30 8* 31 6 31 3*   RDW % 17 5* 17 4* 17 3* 17 3* 17 1* 16 7* 16 5* 16 3*   MPV fL 11 7 11 8 12 1 12 1 12 4 12 5 12 2 11 5   NRBC AUTO /100 WBCs  --  0 0 0 0 0 0 0       CMP:  Results from last 7 days  Lab Units 01/15/19  0447 01/14/19  0445 01/13/19  0449 01/12/19  0446 01/11/19  0455 01/10/19  0442 01/09/19  1400   POTASSIUM mmol/L 4 8 5 2 5 4* 4 9 4 9 4 8 4 4   CHLORIDE mmol/L 92* 90* 93* 93* 95* 94* 87*   CO2 mmol/L 32 33* 31 30 28 30 33*   BUN mg/dL 27* 44* 34* 22 34* 27* 40*   CREATININE mg/dL 3 08* 4 06* 3 64* 3 07* 4 02* 3 51* 4 48*   CALCIUM mg/dL 8 9 8 9 8 9 8 6 9 2 8 8 9 1   EGFR ml/min/1 73sq m 19 14 16 19 14 17 12       BMP:  Results from last 7 days  Lab Units 01/15/19  0447 01/14/19  0445 01/13/19  0449 01/12/19  0446 01/11/19  0455 01/10/19  0442 01/09/19  1400   POTASSIUM mmol/L 4 8 5 2 5 4* 4 9 4 9 4 8 4 4   CHLORIDE mmol/L 92* 90* 93* 93* 95* 94* 87*   CO2 mmol/L 32 33* 31 30 28 30 33*   BUN mg/dL 27* 44* 34* 22 34* 27* 40*   CREATININE mg/dL 3 08* 4 06* 3 64* 3 07* 4 02* 3 51* 4 48*   CALCIUM mg/dL 8 9 8 9 8 9 8 6 9 2 8 8 9 1       Lab Results   Component Value Date    NTBNP 23,856 (H) 01/02/2019    NTBNP 22,385 (H) 12/31/2018             Results from last 7 days  Lab Units 01/10/19  0442   MAGNESIUM mg/dL 2 3                           Lipid Profile:   No results found for: CHOL  Lab Results   Component Value Date    HDL 48 01/10/2019     Lab Results   Component Value Date    LDLCALC 58 01/10/2019     Lab Results   Component Value Date    TRIG 65 01/10/2019       Cardiac testing:   No results found for this or any previous visit  No results found for this or any previous visit  No results found for this or any previous visit  No procedure found  No results found for this or any previous visit        Meds/Allergies   all current active meds have been reviewed and current meds:   Current Facility-Administered Medications   Medication Dose Route Frequency    acetaminophen (TYLENOL) tablet 650 mg  650 mg Oral Q6H PRN    aspirin chewable tablet 81 mg  81 mg Oral Daily    atorvastatin (LIPITOR) tablet 40 mg  40 mg Oral Daily With Dinner    b complex-vitamin C-folic acid (NEPHROCAPS) capsule 1 capsule  1 capsule Oral Daily With Dinner    benzonatate (TESSALON PERLES) capsule 100 mg  100 mg Oral TID    calcium carbonate (TUMS) chewable tablet 500 mg  500 mg Oral Daily PRN    docusate sodium (COLACE) capsule 100 mg  100 mg Oral BID PRN    epoetin deepa (EPOGEN,PROCRIT) injection 2,000 Units  2,000 Units Intravenous After Dialysis    And    epoetin deepa (EPOGEN,PROCRIT) injection 3,000 Units  3,000 Units Intravenous After Dialysis    furosemide (LASIX) tablet 40 mg  40 mg Oral TID (diuretic)    heparin (porcine) subcutaneous injection 5,000 Units  5,000 Units Subcutaneous Q8H [facility]    hydrALAZINE (APRESOLINE) tablet 25 mg  25 mg Oral Q8H [facility]    insulin glargine (LANTUS) subcutaneous injection 24 Units 0 24 mL  24 Units Subcutaneous HS    insulin lispro (HumaLOG) 100 units/mL subcutaneous injection 1-5 Units  1-5 Units Subcutaneous HS    insulin lispro (HumaLOG) 100 units/mL subcutaneous injection 1-6 Units  1-6 Units Subcutaneous TID AC    insulin lispro (HumaLOG) 100 units/mL subcutaneous injection 10 Units  10 Units Subcutaneous Daily With Dinner    insulin lispro (HumaLOG) 100 units/mL subcutaneous injection 8 Units  8 Units Subcutaneous BID before breakfast/lunch    isosorbide mononitrate (IMDUR) 24 hr tablet 30 mg  30 mg Oral Daily    lactulose 20 g/30 mL oral solution 10 g  10 g Oral Daily    melatonin tablet 3 mg  3 mg Oral HS    metoprolol tartrate (LOPRESSOR) tablet 25 mg  25 mg Oral Q12H [facility]    nitroglycerin (TRIDIL) 3,500 mcg, verapamil (ISOPTIN) 7 5 mg in multi-electrolyte (ISOLYTE-S PH 7 4 equivalent) 500 mL IV bag   Irrigation Once    ondansetron (ZOFRAN) injection 4 mg  4 mg Intravenous Q6H PRN    pantoprazole (PROTONIX) EC tablet 40 mg  40 mg Oral Early Morning    polyethylene glycol (MIRALAX) packet 17 g  17 g Oral Daily PRN    ranolazine (RANEXA) 12 hr tablet 500 mg  500 mg Oral Q12H [facility]     Prescriptions Prior to Admission   Medication    aspirin 81 mg chewable tablet    atorvastatin (LIPITOR) 10 mg tablet    benzonatate (TESSALON PERLES) 100 mg capsule    METOPROLOL SUCCINATE PO    pantoprazole (PROTONIX) 40 mg tablet    sodium bicarbonate 325 MG tablet    torsemide (DEMADEX) 20 mg tablet       Counseling / Coordination of Care  Total floor / unit time spent today 20 minutes  Greater than 50% of total time was spent with the patient and / or family counseling and / or coordination of care  ** Please Note: Dragon 360 Dictation voice to text software may have been used in the creation of this document   **

## 2019-01-15 NOTE — PROGRESS NOTES
NEPHROLOGY PROGRESS NOTE   Jennifer Garcia 72 y o  male MRN: [id number]  Unit/Bed#: [facility] 403-01 Encounter: [id number]      ASSESSMENT & PLAN:  1  Acute kidney injury on top of CKD that progressed to end-stage renal disease  Last hemodialysis treatment yesterday  Patient today expressed that he does not want to continue with dialysis, he expressed understanding of what will happen and he understand that his life span will be shorter  I have contact primary team and make them aware of patient's wishes  I have called and spoke with patient's son Sravani Hughes and also made him aware of his father deciding to not continue with dialysis    2  Non STEMI, Systolic congestive heart failure, ischemic cardiomyopathy with an EF of 35%, multivessel coronary artery disease  Cardiology and cardiac surgery on board  Cardiac surgery has canceled surgery due to concerns for patient not going to participate in postoperative care as well as high surgical risk due to deconditioning  No PCI options as per Cardiology note    3  Hyponatremia, follow fluid restriction  4  Anemia of chronic disease, follow H&H and transfusion as needed  5  Hyperkalemia, resolved after dialysis  6  Access, right IJ PermCath access  Discussed with primary team resident  Patient's son will coming early this afternoon to discuss with his father  Discussed with a SOD resident, patient does not want to continue with dialysis  Consider palliative care consult to discuss goals of care  SUBJECTIVE:  Patient seen and examined, denies any significant chest pain or shortness of Breath, her hemodialysis yesterday  Patient today states that he does not want to continue with dialysis, his pressure understanding of what happened after Izora Stalls will be 6 ft underground, he wants to leave the hospital and go home      OBJECTIVE:  Current Weight: Weight - Scale: 62 2 kg (137 lb 2 oz)  Vitals:    01/15/19 0708   BP: 138/69   Pulse: 90   Resp: 18   Temp: 97 8 °F (36 6 °C)   SpO2: 100%       Intake/Output Summary (Last 24 hours) at 01/15/19 1055  Last data filed at 01/15/19 0753   Gross per 24 hour   Intake             1360 ml   Output             3904 ml   Net            -2544 ml     General: conscious, cooperative, in not acute distress  Eyes: conjunctivae pale, anicteric sclerae  ENT: lips and mucous membranes moist  Neck: supple    Chest: clear breath sounds bilateral, no crackles, ronchus or wheezings  CVS: distinct S1 & S2, normal rate, regular rhythm  Abdomen: soft, non-tender, non-distended, normoactive bowel sounds  Extremities:  Trace to 1+ edema of both legs  Skin: no rash  Neuro: awake, alert, oriented      Medications:    Current Facility-Administered Medications:     acetaminophen (TYLENOL) tablet 650 mg, 650 mg, Oral, Q6H PRN, Clay Arriaga MD, 650 mg at 01/13/19 0027    aspirin chewable tablet 81 mg, 81 mg, Oral, Daily, Clay Arriaga MD, 81 mg at 01/15/19 0807    atorvastatin (LIPITOR) tablet 40 mg, 40 mg, Oral, Daily With Ivory Devlin MD, 40 mg at 01/14/19 1622    b complex-vitamin C-folic acid (NEPHROCAPS) capsule 1 capsule, 1 capsule, Oral, Daily With Ivory Devlin MD, 1 capsule at 01/14/19 1622    benzonatate (TESSALON PERLES) capsule 100 mg, 100 mg, Oral, TID, Clay Arriaga MD, 100 mg at 01/15/19 0807    calcium carbonate (TUMS) chewable tablet 500 mg, 500 mg, Oral, Daily PRN, Edita Forman MD, 500 mg at 01/13/19 0027    docusate sodium (COLACE) capsule 100 mg, 100 mg, Oral, BID PRN, Edita Forman MD, 100 mg at 01/12/19 0201    epoetin deepa (EPOGEN,PROCRIT) injection 2,000 Units, 2,000 Units, Intravenous, After Dialysis, 2,000 Units at 01/14/19 0941 **AND** epoetin deepa (EPOGEN,PROCRIT) injection 3,000 Units, 3,000 Units, Intravenous, After Dialysis, Clay Arriaga MD, 3,000 Units at 01/14/19 0941    furosemide (LASIX) tablet 40 mg, 40 mg, Oral, TID (diuretic), Wesley Gonzalez DO, 40 mg at 01/15/19 0631    heparin (porcine) subcutaneous injection 5,000 Units, 5,000 Units, Subcutaneous, Q8H [address], Edna Curtis PA-C, 5,000 Units at 01/15/19 0631    hydrALAZINE (APRESOLINE) tablet 25 mg, 25 mg, Oral, Q8H [address], Salvatore Chavira MD, 25 mg at 01/15/19 0631    insulin glargine (LANTUS) subcutaneous injection 24 Units 0 24 mL, 24 Units, Subcutaneous, HS, Marissa Arrow, DO, 24 Units at 01/14/19 2118    insulin lispro (HumaLOG) 100 units/mL subcutaneous injection 1-5 Units, 1-5 Units, Subcutaneous, HS, Schuyler Bennett MD, 1 Units at 01/14/19 2118    insulin lispro (HumaLOG) 100 units/mL subcutaneous injection 1-6 Units, 1-6 Units, Subcutaneous, TID AC, 1 Units at 01/15/19 7555 **AND** Fingerstick Glucose (POCT), , , TID AC, Schuyler Bennett MD    insulin lispro (HumaLOG) 100 units/mL subcutaneous injection 10 Units, 10 Units, Subcutaneous, Daily With Yazan Christianson DO, 10 Units at 01/13/19 1700    insulin lispro (HumaLOG) 100 units/mL subcutaneous injection 8 Units, 8 Units, Subcutaneous, BID before breakfast/lunch, Marissa Arrow, DO, 8 Units at 01/13/19 0653    isosorbide mononitrate (IMDUR) 24 hr tablet 30 mg, 30 mg, Oral, Daily, Salvatore Chavira MD, 30 mg at 01/15/19 0808    lactulose 20 g/30 mL oral solution 10 g, 10 g, Oral, Daily, Magdalena Francis, DO, 10 g at 01/15/19 0807    melatonin tablet 3 mg, 3 mg, Oral, HS, Schuyler Bennett MD, 3 mg at 01/14/19 2118    metoprolol tartrate (LOPRESSOR) tablet 25 mg, 25 mg, Oral, Q12H [address], Schuyler Bennett MD, 25 mg at 01/15/19 0807    nitroglycerin (TRIDIL) 3,500 mcg, verapamil (ISOPTIN) 7 5 mg in multi-electrolyte (ISOLYTE-S PH 7 4 equivalent) 500 mL IV bag, , Irrigation, Once, Gino Montiel DO    ondansetron [facility]) injection 4 mg, 4 mg, Intravenous, Q6H PRN, Schuyler Bennett MD    pantoprazole (PROTONIX) EC tablet 40 mg, 40 mg, Oral, Early Morning, Schuyler Bennett MD, 40 mg at 01/15/19 9363   polyethylene glycol (MIRALAX) packet 17 g, 17 g, Oral, Daily PRN, Herve An MD    ranolazine (RANEXA) 12 hr tablet 500 mg, 500 mg, Oral, Q12H [address], Cee West MD, 500 mg at 01/15/19 0807    Invasive Devices:        Lab Results:     Results from last 7 days  Lab Units 01/15/19  0447 01/14/19  0445 01/13/19  0449  01/10/19  0442   WBC Thousand/uL 9 19 10 50* 8 81  < > 9 93   HEMOGLOBIN g/dL 8 7* 9 2* 9 1*  < > 9 7*   HEMATOCRIT % 27 6* 29 4* 29 2*  < > 31 5*   PLATELETS Thousands/uL 146* 185 139*  < > 118*   SODIUM mmol/L 127* 127* 127*  < > 127*   POTASSIUM mmol/L 4 8 5 2 5 4*  < > 4 8   CHLORIDE mmol/L 92* 90* 93*  < > 94*   CO2 mmol/L 32 33* 31  < > 30   BUN mg/dL 27* 44* 34*  < > 27*   CREATININE mg/dL 3 08* 4 06* 3 64*  < > 3 51*   CALCIUM mg/dL 8 9 8 9 8 9  < > 8 8   MAGNESIUM mg/dL  --   --   --   --  2 3   PHOSPHORUS mg/dL  --   --   --   --  2 2*   < > = values in this interval not displayed  Portions of the record may have been created with voice recognition software  Occasional wrong word or "sound a like" substitutions may have occurred due to the inherent limitations of voice recognition software  Read the chart carefully and recognize, using context, where substitutions have occurred  If you have any questions, please contact the dictating provider

## 2019-01-15 NOTE — PROGRESS NOTES
Patient scheduled for dialysis at 0800  Told patient about scheduled dialysis treatment  Patient stating he does not want dialysis anymore  Patient says if he is not eligible for surgery or stenting he wants to go home today  SOD made aware  Transport called to pick patient up for dialysis  Made transport aware patient was refusing dialysis

## 2019-01-16 VITALS
WEIGHT: 138.89 LBS | HEART RATE: 65 BPM | RESPIRATION RATE: 18 BRPM | OXYGEN SATURATION: 95 % | HEIGHT: 65 IN | TEMPERATURE: 97.4 F | DIASTOLIC BLOOD PRESSURE: 56 MMHG | SYSTOLIC BLOOD PRESSURE: 116 MMHG | BODY MASS INDEX: 23.14 KG/M2

## 2019-01-16 PROBLEM — I21.4 MI, ACUTE, NON ST SEGMENT ELEVATION (HCC): Status: RESOLVED | Noted: 2019-01-03 | Resolved: 2019-01-16

## 2019-01-16 LAB
ANION GAP SERPL CALCULATED.3IONS-SCNC: 5 MMOL/L (ref 4–13)
BUN SERPL-MCNC: 30 MG/DL (ref 5–25)
CALCIUM SERPL-MCNC: 9 MG/DL (ref 8.3–10.1)
CHLORIDE SERPL-SCNC: 86 MMOL/L (ref 100–108)
CO2 SERPL-SCNC: 30 MMOL/L (ref 21–32)
CREAT SERPL-MCNC: 3.89 MG/DL (ref 0.6–1.3)
ERYTHROCYTE [DISTWIDTH] IN BLOOD BY AUTOMATED COUNT: 17.8 % (ref 11.6–15.1)
GFR SERPL CREATININE-BSD FRML MDRD: 15 ML/MIN/1.73SQ M
GLUCOSE SERPL-MCNC: 175 MG/DL (ref 65–140)
GLUCOSE SERPL-MCNC: 266 MG/DL (ref 65–140)
GLUCOSE SERPL-MCNC: 297 MG/DL (ref 65–140)
HCT VFR BLD AUTO: 28.5 % (ref 36.5–49.3)
HGB BLD-MCNC: 9 G/DL (ref 12–17)
MCH RBC QN AUTO: 26.4 PG (ref 26.8–34.3)
MCHC RBC AUTO-ENTMCNC: 31.6 G/DL (ref 31.4–37.4)
MCV RBC AUTO: 84 FL (ref 82–98)
PLATELET # BLD AUTO: 171 THOUSANDS/UL (ref 149–390)
PMV BLD AUTO: 11.4 FL (ref 8.9–12.7)
POTASSIUM SERPL-SCNC: 5.5 MMOL/L (ref 3.5–5.3)
RBC # BLD AUTO: 3.41 MILLION/UL (ref 3.88–5.62)
SODIUM SERPL-SCNC: 121 MMOL/L (ref 136–145)
WBC # BLD AUTO: 9.52 THOUSAND/UL (ref 4.31–10.16)

## 2019-01-16 PROCEDURE — 99238 HOSP IP/OBS DSCHRG MGMT 30/<: CPT | Performed by: INTERNAL MEDICINE

## 2019-01-16 PROCEDURE — 82948 REAGENT STRIP/BLOOD GLUCOSE: CPT

## 2019-01-16 PROCEDURE — 99232 SBSQ HOSP IP/OBS MODERATE 35: CPT | Performed by: INTERNAL MEDICINE

## 2019-01-16 PROCEDURE — 85027 COMPLETE CBC AUTOMATED: CPT | Performed by: INTERNAL MEDICINE

## 2019-01-16 PROCEDURE — 80048 BASIC METABOLIC PNL TOTAL CA: CPT | Performed by: INTERNAL MEDICINE

## 2019-01-16 RX ADMIN — INSULIN LISPRO 4 UNITS: 100 INJECTION, SOLUTION INTRAVENOUS; SUBCUTANEOUS at 06:17

## 2019-01-16 RX ADMIN — INSULIN LISPRO 1 UNITS: 100 INJECTION, SOLUTION INTRAVENOUS; SUBCUTANEOUS at 11:35

## 2019-01-16 RX ADMIN — HEPARIN SODIUM 5000 UNITS: 5000 INJECTION INTRAVENOUS; SUBCUTANEOUS at 06:31

## 2019-01-16 RX ADMIN — FUROSEMIDE 40 MG: 40 TABLET ORAL at 11:35

## 2019-01-16 RX ADMIN — LACTULOSE 10 G: 10 SOLUTION ORAL at 09:06

## 2019-01-16 RX ADMIN — RANOLAZINE 500 MG: 500 TABLET, FILM COATED, EXTENDED RELEASE ORAL at 09:06

## 2019-01-16 RX ADMIN — CALCIUM CARBONATE (ANTACID) CHEW TAB 500 MG 500 MG: 500 CHEW TAB at 02:08

## 2019-01-16 RX ADMIN — BENZONATATE 100 MG: 100 CAPSULE ORAL at 09:06

## 2019-01-16 RX ADMIN — ISOSORBIDE MONONITRATE 30 MG: 30 TABLET, EXTENDED RELEASE ORAL at 09:06

## 2019-01-16 RX ADMIN — METOPROLOL TARTRATE 25 MG: 25 TABLET, FILM COATED ORAL at 09:06

## 2019-01-16 RX ADMIN — PANTOPRAZOLE SODIUM 40 MG: 40 TABLET, DELAYED RELEASE ORAL at 06:13

## 2019-01-16 RX ADMIN — FUROSEMIDE 40 MG: 40 TABLET ORAL at 06:13

## 2019-01-16 RX ADMIN — ASPIRIN 81 MG 81 MG: 81 TABLET ORAL at 09:06

## 2019-01-16 RX ADMIN — HYDRALAZINE HYDROCHLORIDE 25 MG: 25 TABLET ORAL at 06:13

## 2019-01-16 NOTE — PROGRESS NOTES
Patient is awake and sitting on side of bed states he doesn't feel well  When asked about how he is feeling, states his stomach is upset but denies indigestion/heart burn but feels as if he could vomit  Patient asking for something to eat, given a banana per his request and gave patient ordered PRN tums  Will continue to monitor

## 2019-01-16 NOTE — PROGRESS NOTES
NEPHROLOGY PROGRESS NOTE   Julian Goldstein 72 y o  male MRN: [id number]  Unit/Bed#: [facility] 403-01 Encounter: [id number]      ASSESSMENT & PLAN:  1  Acute kidney injury on top of CKD that progressed to end-stage renal disease  Last hemodialysis treatment Monday 1/14  Patient yesterday states that he does not want to continue with dialysis, after further family discussion patient agree to be discharged home and continue with dialysis as an outpatient  Hemodialysis set up a TTS schedule, for hemodialysis tomorrow after discharge  2  Non STEMI, Systolic congestive heart failure, ischemic cardiomyopathy with an EF of 35%, multivessel coronary artery disease  Cardiology and cardiac surgery on board  Cardiac surgery has canceled surgery due to concerns for patient not going to participate in postoperative care as well as high surgical risk due to deconditioning  No PCI options as per Cardiology note    3  Hyponatremia, patient needs to be on 1 2 L fluid restriction, serum sodium around 124 when corrected for blood sugar  Hyponatremia secondary to congestive heart failure as well as end-stage renal disease    4  Anemia of chronic disease, follow H&H and transfusion as needed  5  Hyperkalemia, needs to be on a renal diet, low-potassium, low phosphorus, for hemodialysis tomorrow, serum potassium today 5 5     6  Access, right IJ PermCath access  Okay to be discharged from renal standpoint of view, continue with hemodialysis tomorrow as an outpatient as already arranged by   Discussed with primary team resident  SUBJECTIVE:  Patient seen and examined, complaining of pain all over, denies significant shortness of breath or chest pain  After family meeting and further discussion with patient and rest of his family yesterday, he agree to be discharged and continue with dialysis as an outpatient, recommended patient go to rehab but he refused      OBJECTIVE:  Current Weight: Weight - Scale: 63 kg (138 lb 14 2 oz)  Vitals:    01/16/19 1038   BP:    Pulse:    Resp:    Temp:    SpO2: 98%       Intake/Output Summary (Last 24 hours) at 01/16/19 1047  Last data filed at 01/16/19 1009   Gross per 24 hour   Intake             1430 ml   Output               25 ml   Net             1405 ml     General: conscious, cooperative, in not acute distress  Eyes: conjunctivae pale, anicteric sclerae  ENT: lips and mucous membranes moist  Neck: supple, mild JVD  Chest: clear breath sounds bilateral, no crackles, ronchus or wheezings  CVS: distinct S1 & S2, normal rate, regular rhythm  Abdomen: soft, non-tender, non-distended, normoactive bowel sounds  Extremities:  Trace to 1+ edema of both legs  Skin: no rash  Neuro: awake, alert, oriented    Right IJ PermCath in place    Medications:    Current Facility-Administered Medications:     acetaminophen (TYLENOL) tablet 650 mg, 650 mg, Oral, Q6H PRN, Lina Maradiaga MD, 650 mg at 01/15/19 1807    aspirin chewable tablet 81 mg, 81 mg, Oral, Daily, Lina Maradiaga MD, 81 mg at 01/16/19 0906    atorvastatin (LIPITOR) tablet 40 mg, 40 mg, Oral, Daily With Suzie Gaona MD, 40 mg at 01/15/19 1723    b complex-vitamin C-folic acid (NEPHROCAPS) capsule 1 capsule, 1 capsule, Oral, Daily With Suzie Gaona MD, 1 capsule at 01/15/19 1723    benzonatate (TESSALON PERLES) capsule 100 mg, 100 mg, Oral, TID, Lina Maradiaga MD, 100 mg at 01/16/19 0906    calcium carbonate (TUMS) chewable tablet 500 mg, 500 mg, Oral, Daily PRN, Edita Forman MD, 500 mg at 01/16/19 0208    docusate sodium (COLACE) capsule 100 mg, 100 mg, Oral, BID PRN, Edita Forman MD, 100 mg at 01/12/19 0201    epoetin deepa (EPOGEN,PROCRIT) injection 2,000 Units, 2,000 Units, Intravenous, After Dialysis, 2,000 Units at 01/14/19 0941 **AND** epoetin deepa (EPOGEN,PROCRIT) injection 3,000 Units, 3,000 Units, Intravenous, After Dialysis, Lina Maradiaga MD, 3,000 Units at 01/14/19 0873    furosemide (LASIX) tablet 40 mg, 40 mg, Oral, TID (diuretic), Wesley Gonzalez, DO, 40 mg at 01/16/19 6670    heparin (porcine) subcutaneous injection 5,000 Units, 5,000 Units, Subcutaneous, Q8H [facility] [place] HOME, Daytonjose antonio Deutsch PA-C, 5,000 Units at 01/16/19 0631    hydrALAZINE (APRESOLINE) tablet 25 mg, 25 mg, Oral, Q8H [facility], Cinthia Alves MD, 25 mg at 01/16/19 0022    insulin glargine (LANTUS) subcutaneous injection 24 Units 0 24 mL, 24 Units, Subcutaneous, HS, Radha Martínez DO, 24 Units at 01/15/19 2107    insulin lispro (HumaLOG) 100 units/mL subcutaneous injection 1-5 Units, 1-5 Units, Subcutaneous, HS, Raghu Reyes MD, 2 Units at 01/15/19 2107    insulin lispro (HumaLOG) 100 units/mL subcutaneous injection 1-6 Units, 1-6 Units, Subcutaneous, TID AC, 4 Units at 01/16/19 0617 **AND** Fingerstick Glucose (POCT), , , TID AC, Raghu Reyes MD    insulin lispro (HumaLOG) 100 units/mL subcutaneous injection 10 Units, 10 Units, Subcutaneous, Daily With Berta Vieira DO, 10 Units at 01/13/19 1700    insulin lispro (HumaLOG) 100 units/mL subcutaneous injection 8 Units, 8 Units, Subcutaneous, BID before breakfast/lunch, Radha Martínez DO, 8 Units at 01/16/19 0908    isosorbide mononitrate (IMDUR) 24 hr tablet 30 mg, 30 mg, Oral, Daily, Cinthia Alves MD, 30 mg at 01/16/19 0906    lactulose 20 g/30 mL oral solution 10 g, 10 g, Oral, Daily, Laura Cohn DO, 10 g at 01/16/19 0906    melatonin tablet 3 mg, 3 mg, Oral, HS, Raghu Reyes MD, 3 mg at 01/15/19 2107    metoprolol tartrate (LOPRESSOR) tablet 25 mg, 25 mg, Oral, Q12H [facility], Raghu Reyes MD, 25 mg at 01/16/19 0906    ondansetron (ZOFRAN) injection 4 mg, 4 mg, Intravenous, Q6H PRN, Raghu Reyes MD    pantoprazole (PROTONIX) EC tablet 40 mg, 40 mg, Oral, Early Morning, Raghu Reyes MD, 40 mg at 01/16/19 5341    polyethylene glycol (MIRALAX) packet 17 g, 17 g, Oral, Daily PRN, Kevin Perez MD  [facility] ranolazine (RANEXA) 12 hr tablet 500 mg, 500 mg, Oral, Q12H [facility] HOME, Deni Coelho MD, 500 mg at 01/16/19 0906    Invasive Devices:        Lab Results:     Results from last 7 days  Lab Units 01/16/19  0631 01/15/19  0447 01/14/19  0445  01/10/19  0442   WBC Thousand/uL 9 52 9 19 10 50*  < > 9 93   HEMOGLOBIN g/dL 9 0* 8 7* 9 2*  < > 9 7*   HEMATOCRIT % 28 5* 27 6* 29 4*  < > 31 5*   PLATELETS Thousands/uL 171 146* 185  < > 118*   SODIUM mmol/L 121* 127* 127*  < > 127*   POTASSIUM mmol/L 5 5* 4 8 5 2  < > 4 8   CHLORIDE mmol/L 86* 92* 90*  < > 94*   CO2 mmol/L 30 32 33*  < > 30   BUN mg/dL 30* 27* 44*  < > 27*   CREATININE mg/dL 3 89* 3 08* 4 06*  < > 3 51*   CALCIUM mg/dL 9 0 8 9 8 9  < > 8 8   MAGNESIUM mg/dL  --   --   --   --  2 3   PHOSPHORUS mg/dL  --   --   --   --  2 2*   < > = values in this interval not displayed  Portions of the record may have been created with voice recognition software  Occasional wrong word or "sound a like" substitutions may have occurred due to the inherent limitations of voice recognition software  Read the chart carefully and recognize, using context, where substitutions have occurred  If you have any questions, please contact the dictating provider

## 2019-01-16 NOTE — PROGRESS NOTES
Patient sitting on side of bed expresses that he is unable to sleep  States he is very worried about going home and having to go through dialysis, is concerned about how he is going to be transported from his home to dialysis since he does not drive and/or have a car

## 2019-01-16 NOTE — RESTORATIVE TECHNICIAN NOTE
Restorative Specialist Mobility Note       Activity: Ambulate in hernandez, Chair     Assistive Device: Front wheel walker

## 2019-01-16 NOTE — PROGRESS NOTES
Patient to be discharged today  Ideally would continue on basal bolus insulin regimen like he is on now  Upon discharge, if Lantus is not the preferred basal insulin for the patient's insurance company, we can use Tresiba, Basaglar, Levemir, Toujeo at the same dose instead  Upon discharge, if Humalog is not the preferred mealtime insulin for the patient's insurance, we can use NovoLog, Fiasp, Admelog, or Apidra at the same dose instead  If above insulin is too expensive or not covered, Novolin 70/30 through [employer] at a dose of 25 units with breakfast and 20 units with dinner would be an alternative  He should follow-up with Endocrinology as an outpatient

## 2019-01-16 NOTE — SOCIAL WORK
Pt  To discharge home today with wife and son transporting  They have good understanding of discharge plan  Home address is [address], [place]  He will have HD tomorrow am at [address] [place] [address] Rd , [address] at [facility] and will have [facility] Chirinos for nsg and PT with visit on 1/18/19  They will discuss HHA with Florence Chirinos RN and will discuss transportation plan with MSW at [facility]

## 2019-01-16 NOTE — PROGRESS NOTES
Patient continues to voice concern over dialysis  Patient did not sleep well overnight, becomes visually upset and tearful when talking to RN  States that "dialysis is torture and I have no more quality to my life  I have had a pretty good life and I am ready to go home and naturally die"  Patient also said he will discuss this with his family today when he goes home

## 2019-01-16 NOTE — PROGRESS NOTES
Progress Note - Jen Bueno 72 y o  male MRN: [id number]    Unit/Bed#: [facility] 403-01 Encounter: [id number]    SOD A, Hospital Days - 8     Assessment/Plan:  1) Coronary artery disease - Patient with multivessel disease; not appropriate for CABG given poor participation in care/deconditioning   - Per cardiology, pt not a candidate for stenting   - Patient to be reassessed at later date should his functional status improve   - Continue Lopressor 25 Q12h and Lipitor 40 QD     2) Type 2 diabetes - Hemoglobin A1c of 8 2 on presentation   Appreciate endocrinology recommendations - now on 24 units long-acting, 8 units with breakfast and lunch, 10 units with dinner, sliding scale algorithm 3 for [facility], algorithm 2 for [facility]   - Will confirm outpatient regimen; A1c is relatively low but given his significant requirements here he may need insulin     3) Acute kidney injury - Baseline creatinine unknown   - Creatinine consistently between 3-4 5 this admission   - Has been undergoing hemodialysis Monday Wednesday Friday   - Hyponatremia persistent at 127, hyperkalemia improved at 4 8     4) Congestive heart failure - Acute on chronic congestive heart failure with an EF of 30 on recent echo   - MARQUISE demonstrates EF of 30%, diffuse hypokinesis, moderate mitral regurgitation   - Lasix 40 TID per nephrology   - Per cardiology:  start hydralazine 25 Q8h, Imdur 30 QD, Ranexa 500 Q12h     5) Dysphagia - Patient complains of dysphagia and associated 30 lb weight loss over the past 6 months       - EGD demonstrated no esophagitis or anatomic obstruction   - Per speech therapy continue level 2 dysphagia diet with thin liquids, puree as desired     Dispo - Skilled short-term rehab per PT/OT, though patient refusing  Pt established for outpatient dialysis (though he may not go)  Family meeting held yesterday, decision made to return home today with home health services and outpatient dialysis      Subjective:   Patient was expressing scan and decision about going to hospice versus going home with home health care and continued dialysis  I spoke to him this morning he stated that he would like to go home with continued dialysis but he would not want to stay on it forever  Complained of some chest pressure and shortness of breath otherwise negative ROS  Objective:     Vitals: Blood pressure 133/60, pulse 71, temperature 97 6 °F (36 4 °C), temperature source Oral, resp  rate 20, height 5' 5" (1 651 m), weight 63 kg (138 lb 14 2 oz), SpO2 100 %  ,Body mass index is 23 11 kg/m²        Intake/Output Summary (Last 24 hours) at 01/16/19 7531  Last data filed at 01/15/19 2233   Gross per 24 hour   Intake             1340 ml   Output               25 ml   Net             1315 ml     General Appearance:    Alert, cooperative, no distress, appears stated age  Head:    Normocephalic, without obvious abnormality, atraumatic  Neck:   Supple, symmetrical, trachea midline, no adenopathy  Lungs:     Clear to auscultation bilaterally, respirations slightly diminished  Heart:    Regular rate, no murmur, S3 present  Abdomen:     Soft, non-tender, bowel sounds active all four quadrants, no masses, no organomegaly  Skin:   Skin color, texture, turgor normal, no rashes or lesions      Invasive Devices     Peripheral Intravenous Line            Peripheral IV 01/13/19 Right Forearm 3 days          Hemodialysis Catheter            Permanent HD Catheter  11 days                Lab, Imaging and other studies: I have personally reviewed pertinent films in PACS   Lab Results   Component Value Date    WBC 9 52 01/16/2019    HGB 9 0 (L) 01/16/2019    HCT 28 5 (L) 01/16/2019    MCV 84 01/16/2019     01/16/2019     Lab Results   Component Value Date    K 4 8 01/15/2019    CL 92 (L) 01/15/2019    CO2 32 01/15/2019    BUN 27 (H) 01/15/2019    CREATININE 3 08 (H) 01/15/2019    CALCIUM 8 9 01/15/2019    AST 14 01/06/2019    ALT 20 01/06/2019    ALKPHOS 61 01/06/2019    EGFR 19 01/15/2019     No results found      VTE Pharmacologic Prophylaxis: Heparin  VTE Mechanical Prophylaxis: sequential compression device

## 2019-01-16 NOTE — DISCHARGE INSTRUCTIONS
Hemodialysis   AMBULATORY CARE:   What you need to know about hemodialysis:  Hemodialysis is a procedure that uses a machine to do the job of your kidneys  The machine pumps your blood through a dialyzer, or artificial kidney  The dialyzer filters fluid, salts, and waste from your blood  Once they are removed, clean blood from the dialyzer returns to your body through a vein  You may need hemodialysis short-term or for the rest of your life  You may need to make changes to your diet and take your medications at the same times  You will work with a team of specialists and may be able to do hemodialysis at home  What needs to be done weeks to months before hemodialysis:  You may need surgery to make an arteriovenous fistula (AVF)  An AVF connects an artery directly to a vein  You may also need surgery to place an arteriovenous graft (AVG) in your arm  An AVG is an artificial tube that connects an artery directly to a vein  The AVF and AVG act as bridges for blood to go from your body to the hemodialysis machine, and back to your body  You may need a central venous catheter (CVC) if you need hemodialysis quickly  A CVC is a temporary catheter that is placed into a large vein in your neck or groin  What is done the day of hemodialysis:  Your weight, temperature, pulse, and blood pressure will be checked  Your access will be checked  The access site is where the blood leaves and returns to your body  You will be able to sit or recline in a chair during hemodialysis  You may sleep during the procedure if it is at night  How often do I need hemodialysis:  You and your healthcare provider will work together to pick the right hemodialysis schedule:  · The usual hemodialysis schedule has 3 sessions each week  Each session lasts 4 to 6 hours  The sessions are usually done in a hospital or hemodialysis center  · Short daily hemodialysis has 6 sessions each week  Each session lasts 2 to 3 hours   The sessions may be done in a hospital, hemodialysis center, or your home  · Nightly hemodialysis can be up to 6 sessions each week  Each session lasts 8 to 10 hours  This may be done in a hospital, a hemodialysis center, or in your home  The sessions are done at night, while you sleep  Where do I get hemodialysis:  You can choose to have hemodialysis at a hospital, hemodialysis center, or possibly in your own home  You will need the right training and proper equipment to do hemodialysis in your home  You will have to check yourself before, during, and after hemodialysis  You will also need to know how to respond to alarms from the hemodialysis machine  You will need to know what to do if you are not feeling well, and when to call your healthcare provider for help  Your healthcare provider will make sure you are well prepared before he lets you do hemodialysis at home  What happens after hemodialysis:  You may have a bandage on your access site after hemodialysis  If your access site starts to bleed, apply gentle pressure with a towel or gauze for 10 to 15 minutes  If a scab forms when it heals, do not pick it off  This can increase your risk for bleeding or infection  Call 911 for any of the following:   · You have sudden chest pain or trouble breathing  · You are breathing fast or have a fast heartbeat  · You feel confused, dizzy, or lightheaded  Seek care immediately if:   · Blood soaks through your bandage  · The skin around your fistula or graft is painful, hot, red, or swollen  · You cannot eat or drink because you are vomiting  · Your fingers are blue or pale, or they feel cool to the touch  Contact your healthcare provider if:   · You have a fever  · You do not feel a buzzing sensation in your fistula or graft  · You have chills, cough, or feel weak and achy  · Your skin itches or you have a rash  · You cannot make it to your follow-up or dialysis visit      · You have questions or concerns about your condition or care  Medicines:   · Medicines and vitamins  may help prevent anemia (low level of red blood cells)  · Take your medicine as directed  Contact your healthcare provider if you think your medicine is not helping or if you have side effects  Tell him of her if you are allergic to any medicine  Keep a list of the medicines, vitamins, and herbs you take  Include the amounts, and when and why you take them  Bring the list or the pill bottles to follow-up visits  Carry your medicine list with you in case of an emergency  AVF or AVG care:   · You may remove the bandage over your fistula or graft 4 to 6 hours after dialysis  · Clean the skin over the fistula or graft each day with soap and water  · Check your fistula or graft each day for good blood flow by touching it with your fingertips  The buzzing sensation means that it is working  · Check for bleeding, pain, redness, or swelling  These may be signs of infection or a clogged fistula or graft  · Prevent damage to the fistula or graft  Do not let anyone take your blood pressure or draw blood from the arm that has the fistula or graft  Do not sleep on that arm  Do not wear tight clothes or jewelry  Nutrition:  Your healthcare provider will tell you what changes you need to make to the foods you eat  A dietitian can help you plan meals  · Eat foods as directed  You may need extra calories or protein  Limit potassium, phosphorus, and sodium (salt)  You may find it hard to eat enough food  Talk to your healthcare provider or dietitian for help or more information about nutrition for dialysis  · Drink liquids as directed  Ask how much liquid to drink each day and which liquids are best for you  Keep a record of how much liquid you drink each day  Count ice cubes, soup, gravy, gelatin, and popsicles  Limit caffeine  · Keep your mouth moist   Suck on hard candy or lemon wedges, or chew gum    Follow up with your healthcare provider as directed:  Write down your questions so you remember to ask them during your visits  © 2017 [address] Information is for End User's use only and may not be sold, redistributed or otherwise used for commercial purposes  All illustrations and images included in CareNotes® are the copyrighted property of [employer]  or [employer]  The above information is an  only  It is not intended as medical advice for individual conditions or treatments  Talk to your doctor, nurse or pharmacist before following any medical regimen to see if it is safe and effective for you  Dialysis Diet   AMBULATORY CARE:   Dialysis diet:  Dialysis is a treatment that removes waste from your blood when your kidneys can no longer do this  A dialysis diet also helps to decrease the amount of waste that builds up in your blood  Your dietitian will help you create a meal plan with the right amount of nutrients  Your diet may change over time based on your weight, blood test results, and other reasons  You may also need to make changes if you have other health problems, such as diabetes  Changes to make if you receive hemodialysis:  You will need to limit potassium, phosphorus, sodium, and liquid  in your diet  You may be able to have more protein than you did before you started dialysis  It may be hard to eat enough food  Your dietitian may suggest that you add extra calories if you lose weight  You can get extra calories by adding sugar, jelly, jam, honey, or syrup to foods  You can also add healthy fats, such as canola oil, olive oil, or soft margarine  If you have diabetes, ask your dietitian how to add calories  Changes to make if you receive peritoneal dialysis:  You will need to limit phosphorus and sodium   You may also need to limit liquid if your body is retaining fluid  You may need to decrease or increase potassium, depending on your blood levels   You will also need extra protein because protein is lost through your treatments  Foods to include: Your dietitian will tell you how many servings you can have from each of the food groups below  The approximate amount of these nutrients is listed next to each food group  Read the food label to find the exact amount  · Bread, cereal, and grains: These foods contain about 80 calories, 2 grams (g) of protein, 150 milligrams (mg) of sodium, 50 mg of potassium, and 30 mg of phosphorus  ¨ 1 slice (1 ounce) of bread ([place], [place], raisin, light rye, or sourdough white), small dinner roll, or 6-inch tortilla    ¨ ½ of a hamburger bun, hot dog bun, or English muffin or ¼ of a bagel    ¨ 1 cup of unsweetened cereal or ½ cup of cooked cereal, such as cream of wheat    ¨ ? cup of cooked pasta (noodles, macaroni, or spaghetti) or rice    ¨ 4 (2-inch) unsalted crackers or 3 squares of allison crackers    ¨ 3 cups of air-popped, unsalted popcorn    ¨ ¾ ounce of unsalted pretzels    · Vegetables:  A serving of these foods contains about 30 calories, 2 g of protein, 50 mg of sodium, and 50 mg of phosphorus  ¨ Low potassium (less than 150 mg):      ¨ ½ cup cooked green beans, cabbage, cauliflower, beets, or corn    ¨ 1 cup raw cucumber, endive, alfalfa sprouts, cabbage, cauliflower, or watercress    ¨ 1 cup of all types of lettuce    ¨ ¼ cup cooked or ½ cup raw mushrooms or onions    ¨ 1 cup cooked eggplant    ¨ Medium potassium (150 to 250 mg):      ¨ 1 cup raw broccoli, celery, or zucchini    ¨ ½ cup cooked broccoli, celery, green peas, summer squash, zucchini, or peppers    ¨ 1 cup cooked kale or turnips    · Fruits:  A serving of these foods contains about 60 calories, 0 g protein, 0 mg sodium, and 150 mg of phosphorus  Each serving is ½ cup, unless another amount is given  ¨ Low potassium (less than 150 mg):       ¨ Apple juice, applesauce, or 1 small apple    ¨ Blueberries    ¨ Cranberries or cranberry juice cocktail    ¨ Fresh or canned pears (light syrup or packed in water)    ¨ Grapes or grape juice    ¨ Canned peaches (light syrup or packed in water)    ¨ Pineapple or strawberries    ¨ 1 tangerine     ¨ Watermelon    ¨ Medium potassium (150 to 250 mg):      ¨ Fresh peaches or pears    ¨ Cherries    ¨ Cantaloupe, josué, or papaya    ¨ Grapefruit or grapefruit juice    · Meat, poultry, and fish: These foods have about 75 calories, 7 g of protein, an average of 65 mg of sodium, 115 mg of potassium, and 70 mg of phosphorus  Do not use salt to prepare these foods  ¨ 1 ounce of cooked beef, pork, or poultry    ¨ 1 ounce of any fresh or frozen fish, lobster, shrimp, crab, clams, tuna, unsalted canned salmon, or unsalted sardines    · Other protein foods: These foods have about 90 calories, 7 g of protein, an average of 100 mg of sodium, 100 mg of potassium, and 120 mg of phosphorus  ¨ 1 large whole egg or ¼ cup of low-cholesterol egg substitute    ¨ 1 ounce of cheese    ¨ ¼ cup of cottage cheese or tofu    ¨ 1 ounce of unsalted nuts or 2 tablespoons of peanut butter    · Fats: These foods have very little protein and about 45 calories, 55 mg of sodium, 10 mg of potassium, and 5 mg of phosphorus  Include healthy fats, such as unsaturated fats, which are listed below  ¨ 1 teaspoon margarine or mayonnaise     ¨ 1 teaspoon oil (safflower, sunflower, corn, soybean, olive, peanut, canola)    ¨ 1 tablespoon oil-based salad dressing (such as [place]) or 2 tablespoons mayonnaise-based salad dressing (such as ranch)    · Liquids:      ¨ Non-cola sodas (ginger ale, lemon-lime sodas)    ¨ Lemonade or limeade    ¨ Water or mineral water  Foods to limit or avoid: The foods you need to limit depend on whether you are on hemodialysis or peritoneal dialysis  Ask your healthcare provider which of the following foods you should limit  · Starches: The following foods have higher amounts of sodium, potassium, or phosphorus      ¨ Biscuits, muffins, pancakes, and waffles     ¨ Cake and cornbread from boxed mixes    ¨ Whole-grain foods such as oatmeal and whole-wheat cereals    ¨ Salted pretzel sticks or rings and sandwich cookies    · Meat and protein foods: The following are high in sodium and phosphorus  ¨ Deli-style meat, such as roast beef, ham, and turkey    ¨ Canned salmon and sardines    ¨ Processed cheese, such as American cheese and cheese spreads    ¨ Smoked or cured meat, such as corned beef, starks, ham, hot dogs, and sausage    · Legumes: These foods have about 90 calories, 6 g of protein, less than 10 mg of sodium, 250 mg of potassium, and 100 mg of phosphorus  ¨ ? cup of black beans, red kidney beans, black-eye peas, garbanzos, and lentils    ¨ ¼ cup of green or mature soybeans    · Dairy: The following foods have about 8 g of protein, an average of 120 mg of sodium, 350 mg of potassium, and 220 mg of phosphorus  ¨ 1 cup of milk (fat-free, low-fat, whole, buttermilk, or chocolate milk)    ¨ 1 cup of low-fat plain or sugar-free yogurt or ice cream    ¨ ½ cup of pudding or custard    ¨ Nondairy milk substitutes: These foods have 75 calories, 1 gram of protein, and an average of 40 mg of sodium, 60 mg of potassium, and 60 mg of phosphorus  A serving is ½ cup of almond, rice, or soy milk, or nondairy creamer  · Vegetables: The following vegetables are high in potassium  Each serving has more than 250 mg of potassium  A serving is ½ cup, unless another amount is given  ¨ Artichoke or ¼ of a medium avocado    ¨ [place] sprouts, beets, chard, neda or mustard greens    ¨ Potatoes, sweet potatoes, pumpkin, and yams    ¨ ¾ cup of okra    ¨ Raw tomatoes and low-sodium tomato juice, or tomato sauce    ¨ Winter squash, cooked asparagus, and cooked spinach    · Fruit:  The following fruits are high in potassium  Each serving has more than 250 mg of potassium       ¨ 3 fresh apricots    ¨ 1 small nectarine (2 inches across)    ¨ 1 small orange or ½ cup of orange juice    ¨ ¼ cup of dates     ¨ ? of a small honeydew melon    ¨ 1 six-inch banana     ¨ ½ cup of prune juice or prunes and kiwifruit    · Fats:  Limit unhealthy fats, such as saturated fats, which are listed below  ¨ Butter, lard, cream cheese, whipped cream, and sour cream    ¨ Powdered coffee creamer    · Other: The following foods are high in sodium  ¨ Frozen dinners, soups, and fast foods, such as hamburgers and pizza (see the food label for serving sizes)    ¨ Table salt and seasoned salts, such as onion or garlic salt    ¨ Barbecue sauce, ketchup, mustard, soy sauce, steak sauce, and teriyaki sauce    · Liquids: These liquids have some potassium or phosphorus in them  Avoid these liquids, or drink only small amounts  ¨ Ramya and pepper-type sodas    ¨ Beer     ¨ Hot chocolate and tea    ¨ Sports drinks    ¨ These liquids are very high in sodium or potassium and should be avoided  § Broth or bouillon    § Consomme    § Salt-free broth or bouillon made with salt substitute (potassium chloride)  Other guidelines to follow:   · Count foods that contain liquid  Foods that contain liquid must be included in the amount you are allowed to have each day  This includes foods such as soup or gravy  Any food that is liquid at room temperature must also be counted  These foods include gelatin, ice cream, and popsicles  · You may need to take a vitamin and mineral supplement  Your dietitian will recommend a vitamin and mineral supplement for you if you need one  Talk with your healthcare provider or dietitian before you take any vitamins, minerals, or herbal supplements  Some types may be harmful  · Do not use salt substitutes because they contain potassium  They may cause the potassium levels in your blood to become too high  Contact your healthcare provider if:   · You gain or lose weight very quickly  · You have shortness of breath  · You feel very weak and tired      · You have questions or concerns about the dialysis diet  © 2017 [address] Information is for End User's use only and may not be sold, redistributed or otherwise used for commercial purposes  All illustrations and images included in CareNotes® are the copyrighted property of [employer]  or [employer]  The above information is an  only  It is not intended as medical advice for individual conditions or treatments  Talk to your doctor, nurse or pharmacist before following any medical regimen to see if it is safe and effective for you  Coronary Artery Disease   WHAT YOU SHOULD KNOW:   Coronary artery disease (CAD) is narrowing of the arteries to your heart caused by a buildup of plaque  Plaque is made up of cholesterol and other substances  The narrowing in your arteries decreases the amount of blood that can flow to your heart  This causes your heart to get less oxygen  INSTRUCTIONS:   Medicines: You may  need any of the following:  · Blood pressure medicines  are given to lower your blood pressure  These medicines may include ACE inhibitors and beta-blockers  ACE inhibitors help keep your blood vessels relaxed and open, which helps keep blood flowing into your heart  Beta-blockers keep your heart pumping strongly and regularly  This helps keep your heart from working too hard to get oxygen  · Cholesterol-lowering medicines  help lower high blood cholesterol levels  · Nitrates , such as nitroglycerin, relax the arteries of your heart so it gets more oxygen  They help to relieve your chest pain  · Antiplatelet medicines , such as aspirin, keep platelets from sticking to a damaged part of your artery  Platelets are a part of your blood that stick together to help heal injuries  They may cause a blockage in your artery and keep blood from flowing to your heart  · Anticoagulants    are a type of blood thinner medicine that helps prevent clots   Clots can cause strokes, heart attacks, and death  These medicines may cause you to bleed or bruise more easily  ¨ Watch for bleeding from your gums or nose  Watch for blood in your urine and bowel movements  Use a soft washcloth and a soft toothbrush  If you shave, use an electric razor  Avoid activities that can cause bruising or bleeding  ¨ Tell your healthcare provider about all medicines you take because many medicines cannot be used with anticoagulants  Do not start or stop any medicines unless your healthcare provider tells you to  Tell your dentist and other healthcare providers that you take anticoagulants  Wear a bracelet or necklace that says you take this medicine  ¨ You will need regular blood tests so your healthcare provider can decide how much medicine you need  Take anticoagulants exactly as directed  Tell your healthcare provider right away if you forget to take the medicine, or if you take too much  ¨ If you take warfarin, some foods can change how your blood clots  Do not make major changes to your diet while you take warfarin  Warfarin works best when you eat about the same amount of vitamin K every day  Vitamin K is found in green leafy vegetables, broccoli, grapes, and other foods  Ask for more information about what to eat when you take warfarin  · Take your medicine as directed  Call your healthcare provider if you think your medicine is not helping or if you have side effects  Tell him if you are allergic to any medicine  Keep a list of the medicines, vitamins, and herbs you take  Include the amounts, and when and why you take them  Bring the list or the pill bottles to follow-up visits  Carry your medicine list with you in case of an emergency  Follow up with your primary healthcare provider or cardiologist as directed: You may need to return for other tests  You may also be referred to a cardiac surgeon   Write down your questions so you remember to ask them during your visits  Cardiac rehabilitation:  Your primary healthcare provider or cardiologist may recommend that you attend cardiac rehabilitation (rehab)  This is a program run by specialists who will help you safely strengthen your heart and reduce the risk of more heart disease  The plan includes exercise, relaxation, stress management, and heart-healthy nutrition  Caregivers will also check to make sure any medicines you are taking are working  Manage CAD:   · Exercise regularly  Exercise at least 30 minutes per day, on most days of the week  Exercise helps to lower high cholesterol and high blood pressure  It can also help you to maintain a healthy weight  Ask your primary healthcare provider about the kind of exercise you should do and how to get started  · Maintain a healthy weight  If you are overweight, talk to your primary healthcare provider about how to lose weight  A weight loss of 10% can improve your heart health  · Eat heart-healthy foods  Include fresh fruits and vegetables in your meal plan  Choose low-fat foods, such as skim or 1% fat milk, low-fat cheese and yogurt, fish, chicken (without skin), and lean meats  Eat two 4-ounce servings of fish high in omega-3 fats each week, such as salmon, fresh tuna, and herring  Avoid foods that are high in sodium, such as canned foods, potato chips, salty snacks, and cold cuts  Put less table salt on your food  · Do not smoke  Smoking increases your risk of a heart attack  If you smoke, it is never too late to quit  Ask primary healthcare provider for information if you need help quitting  · Limit alcohol  Women should limit alcohol to 1 drink a day  Men should limit alcohol to 2 drinks a day  A drink of alcohol is 12 ounces of beer, 5 ounces of wine, or 1½ ounces of liquor  · Manage other health conditions  Follow your primary healthcare provider's advice on how to manage other conditions that can affect your heart health   These include diabetes, high blood pressure, and high cholesterol  You may need to take medicines for these conditions and make other lifestyle changes  Talk to your primary healthcare provider if you are depressed  He may recommend treatment for your depression  · Ask if you should have a flu vaccine  The flu can be dangerous for a person who has CAD  The flu vaccine is available every year in the fall  Contact your primary healthcare provider if:   · You have chest pain that is more frequent, or you have chest pain at rest      · You have questions or concerns about your condition or care  Return to the emergency department if:  You have any of the following signs of a heart attack:  · Squeezing, pressure, fullness, or pain in your chest that lasts longer than a few minutes or returns     · Discomfort or pain in your back, neck, jaw, stomach, or arm    · Shortness of breath or breathing problems    · A sudden cold sweat, lightheadedness, dizziness, or nausea, especially with chest pain or trouble breathing  © 2014 [address] is for End User's use only and may not be sold, redistributed or otherwise used for commercial purposes  All illustrations and images included in CareNotes® are the copyrighted property of [employer]  or [employer]  The above information is an  only  It is not intended as medical advice for individual conditions or treatments  Talk to your doctor, nurse or pharmacist before following any medical regimen to see if it is safe and effective for you

## 2019-01-16 NOTE — UTILIZATION REVIEW
Continued Stay Review    Date: 01/16/2019  Vital Signs: /70 (BP Location: Right arm) Comment: Map98  Pulse 82   Temp 98 3 °F (36 8 °C) (Oral)   Resp 17   Ht 5' 5" (1 651 m)   Wt 63 kg (138 lb 14 2 oz)   SpO2 98%   BMI 23 11 kg/m²   Assessment/Plan:   Medications:   Scheduled Meds:   Current Facility-Administered Medications:  acetaminophen 650 mg Oral Q6H PRN Vince Jack MD   aspirin 81 mg Oral Daily Vince Jack MD   atorvastatin 40 mg Oral Daily With Fran Wing MD   b complex-vitamin C-folic acid 1 capsule Oral Daily With Fran Wing MD   benzonatate 100 mg Oral TID Vince Jack MD   calcium carbonate 500 mg Oral Daily PRN Edita Forman MD   docusate sodium 100 mg Oral BID PRN Edita Forman MD   epoetin deepa 2,000 Units Intravenous After Dialysis Vince Jack MD   And       epoetin deepa 3,000 Units Intravenous After Dialysis Vince Jack MD   furosemide 40 mg Oral TID (diuretic) Niurka Beth DO   heparin (porcine) 5,000 Units Subcutaneous Counts include [facility] beds at the [facility] Olena Gu PA-C   hydrALAZINE 25 mg Oral Q8H [address] Jacinta Choudhary MD   insulin glargine 24 Units Subcutaneous HS Nilton Rainey, DO   insulin lispro 1-5 Units Subcutaneous HS Vince Jack MD   insulin lispro 1-6 Units Subcutaneous TID AC Vince Jack MD   insulin lispro 10 Units Subcutaneous Daily With Dinner Nilton Rainey, DO   insulin lispro 8 Units Subcutaneous BID before breakfast/lunch Nilton Rainey, DO   isosorbide mononitrate 30 mg Oral Daily Jacinta Choudhary MD   lactulose 10 g Oral Daily Laura Cohn, DO   melatonin 3 mg Oral HS Vince Jack MD   metoprolol tartrate 25 mg Oral Q12H [address] Vince Jack MD   ondansetron 4 mg Intravenous Q6H PRN Vince Jack MD   pantoprazole 40 mg Oral Early Morning Vince Jack MD   polyethylene glycol 17 g Oral Daily PRN Deepthi Ayoub MD   ranolazine 500 mg Oral Q12H [address] Jacinta Choudhary MD   Pertinent Labs/Diagnostic Results:   Age/Sex: 72 y o  male   Discharge Plan:   01/16/19 0934  Discharge patient Once     Discharge Disposition: Home with [facility]    Expected Discharge Time: Midday    Expected Discharge Date: 01/16/19

## 2019-01-22 ENCOUNTER — OFFICE VISIT (OUTPATIENT)
Dept: INTERNAL MEDICINE CLINIC | Facility: CLINIC | Age: 74
End: 2019-01-22
Payer: MEDICARE

## 2019-01-22 VITALS — HEART RATE: 68 BPM | DIASTOLIC BLOOD PRESSURE: 64 MMHG | OXYGEN SATURATION: 99 % | SYSTOLIC BLOOD PRESSURE: 90 MMHG

## 2019-01-22 DIAGNOSIS — E11.21 TYPE 2 DIABETES MELLITUS WITH DIABETIC NEPHROPATHY, WITH LONG-TERM CURRENT USE OF INSULIN (HCC): ICD-10-CM

## 2019-01-22 DIAGNOSIS — I50.32 CHRONIC DIASTOLIC CONGESTIVE HEART FAILURE (HCC): ICD-10-CM

## 2019-01-22 DIAGNOSIS — Z99.2 TYPE 2 DIABETES MELLITUS WITH CHRONIC KIDNEY DISEASE ON CHRONIC DIALYSIS, WITH LONG-TERM CURRENT USE OF INSULIN (HCC): Primary | ICD-10-CM

## 2019-01-22 DIAGNOSIS — I10 ESSENTIAL HYPERTENSION: ICD-10-CM

## 2019-01-22 DIAGNOSIS — Z99.2 END STAGE RENAL DISEASE ON DIALYSIS (HCC): ICD-10-CM

## 2019-01-22 DIAGNOSIS — Z79.4 TYPE 2 DIABETES MELLITUS WITH CHRONIC KIDNEY DISEASE ON CHRONIC DIALYSIS, WITH LONG-TERM CURRENT USE OF INSULIN (HCC): Primary | ICD-10-CM

## 2019-01-22 DIAGNOSIS — I25.118 CORONARY ARTERY DISEASE OF NATIVE ARTERY OF NATIVE HEART WITH STABLE ANGINA PECTORIS (HCC): ICD-10-CM

## 2019-01-22 DIAGNOSIS — E78.2 MIXED HYPERLIPIDEMIA: ICD-10-CM

## 2019-01-22 DIAGNOSIS — N18.6 END STAGE RENAL DISEASE ON DIALYSIS (HCC): ICD-10-CM

## 2019-01-22 DIAGNOSIS — Z79.4 TYPE 2 DIABETES MELLITUS WITH DIABETIC NEPHROPATHY, WITH LONG-TERM CURRENT USE OF INSULIN (HCC): ICD-10-CM

## 2019-01-22 DIAGNOSIS — E11.22 TYPE 2 DIABETES MELLITUS WITH CHRONIC KIDNEY DISEASE ON CHRONIC DIALYSIS, WITH LONG-TERM CURRENT USE OF INSULIN (HCC): Primary | ICD-10-CM

## 2019-01-22 DIAGNOSIS — N18.6 TYPE 2 DIABETES MELLITUS WITH CHRONIC KIDNEY DISEASE ON CHRONIC DIALYSIS, WITH LONG-TERM CURRENT USE OF INSULIN (HCC): Primary | ICD-10-CM

## 2019-01-22 PROBLEM — R13.10 DYSPHAGIA: Status: RESOLVED | Noted: 2019-01-08 | Resolved: 2019-01-22

## 2019-01-22 PROBLEM — N18.9 CKD (CHRONIC KIDNEY DISEASE): Status: RESOLVED | Noted: 2018-12-31 | Resolved: 2019-01-22

## 2019-01-22 PROCEDURE — 1124F ACP DISCUSS-NO DSCNMKR DOCD: CPT | Performed by: INTERNAL MEDICINE

## 2019-01-22 PROCEDURE — 99204 OFFICE O/P NEW MOD 45 MIN: CPT | Performed by: INTERNAL MEDICINE

## 2019-01-22 NOTE — PROGRESS NOTES
INTERNAL MEDICINE OFFICE VISIT  [facility] AT [address], 0 [facility] [place]  Tel: [phone]      NAME: Tiana Brown  AGE: 72 y o  SEX: male  : 1945   MRN: [id number]    DATE: 2019  TIME: 11:58 AM      Assessment and Plan:  1  Type 2 diabetes mellitus with chronic kidney disease on chronic dialysis, with long-term current use of insulin ([facility] )  He was told to change the dose of the insulin to 20 units twice a day and to take it regularly  He was also told to take it with food  - CBC and differential; Future  - Comprehensive metabolic panel; Future  - Lipid panel; Future  - TSH, 3rd generation; Future  - Hemoglobin A1C; Future    2  Chronic diastolic congestive heart failure ([facility] )  He was told to continue the furosemide and follow up with Cardiology    3  Coronary artery disease of native artery of native heart with stable angina pectoris ([place] [address] )  Follow up with Cardiology    4  End stage renal disease on dialysis ([place] [address] )  Continue dialysis    5  Essential hypertension  Continue medications    6  Mixed hyperlipidemia  Continue atorvastatin    7  Type 2 diabetes mellitus with diabetic nephropathy, with long-term current use of insulin (HCC)    - insulin isophane-insulin regular (HUMULIN 70/30 KWIKPEN) 100 units/mL injection pen; 20 units twice daily  Dispense: 5 pen; Refill: 3      - Counseling Documentation: patient was counseled regarding: instructions for management, risk factor reductions, prognosis, patient and family education, impressions, risks and benefits of treatment options and importance of compliance with treatment  - Medication Side Effects: Adverse side effects of medications were reviewed with the patient/guardian today  I had a long talk with the son and explained to him the for prognosis the patient  Return for follow up visit in 3 months or earlier, if needed        Chief Complaint:  Chief Complaint   Patient presents with  Establish Care         History of Present Illness: This is a new patient who is here to establish  It is a very complicated patient with multiple comorbidities  He was recently in the hospital with a non ST elevation MI and could not get stents as he needs a CABG to be done soon  He is not a good surgical candidate and was told to get some cardiac rehab done but he refused and got himself discharged to home  Coronary artery disease and congestive heart failure-patient has been on well for the last 6 months and was in [place] before that  He was not getting the proper medical care and kept on deteriorating  [place] in [place] and then [place] live with his son and daughter-in-law, he had another MI  He is a poor surgical candidate for CABG even though he needs it urgently  Presently he is on medical treatment  He has an appointment with the cardiologist at the end of the month  Type 2 diabetes-poorly controlled with a hemoglobin A1c of 8 2  Presently he is on insulin  End-stage renal disease-he is on dialysis 3 times a week  He does not want to do it but his family is forcing him to get the dialysis regularly  Hypertension-his blood pressure is low today as he came straight from the dialysis to the office  Hyperlipidemia-he takes atorvastatin regularly  I spoke with his son in detail and explained to him that the patient is not doing well and that he needs to be taken to the ER in case he deteriorates        Active Problem List:  Patient Active Problem List   Diagnosis    Shortness of breath    Chest pain    [facility]    Cardiomyopathy ([facility] )    Hyponatremia    Chronic diastolic congestive heart failure (HCC)    Anemia due to chronic kidney disease, on chronic dialysis ([facility] )    Type 2 diabetes mellitus with kidney complication, with long-term current use of insulin (HCC)    Weight loss    Coronary artery disease involving native coronary artery    Essential hypertension    Severe protein-calorie malnutrition (HCC)    Diabetic polyneuropathy (HCC)    Di[facility] retinopathy ([facility] )    Mixed hyperlipidemia         Past Medical History:  Past Medical History:   Diagnosis Date    [facility]     Diabetes mellitus ([facility] )     Dysphagia     Hyperlipidemia     Hypertension     Myocardial infarction [facility])          Past Surgical History:  Past Surgical History:   Procedure Laterality Date    CATARACT EXTRACTION      ESOPHAGOGASTRODUODENOSCOPY N/A 1/10/2019    Procedure: ESOPHAGOGASTRODUODENOSCOPY (EGD); Surgeon: Willam Mohan MD;  [facility]: [facility]; Service: Gastroenterology    IR [facility] PLACEMENT  1/4/2019         Family History:  History reviewed  No pertinent family history        Social History:  Social History     Social History    Marital status: /Civil Union     Spouse name: N/A    Number of children: N/A    Years of education: N/A     Social History Main Topics    Smoking status: Former Smoker    Smokeless tobacco: Never Used    Alcohol use No    Drug use: No    Sexual activity: Not Asked     Other Topics Concern    None     Social History Narrative    None         Allergies:  No Known Allergies      Medications:    Current Outpatient Prescriptions:     aspirin 81 mg chewable tablet, Chew 81 mg daily, Disp: , Rfl:     atorvastatin (LIPITOR) 40 mg tablet, Take 1 tablet (40 mg total) by mouth daily, Disp: 30 tablet, Rfl: 0    b complex-vitamin C-folic acid (NEPHROCAPS) 1 mg capsule, Take 1 capsule by mouth daily with dinner, Disp: 30 capsule, Rfl: 0    benzonatate (TESSALON PERLES) 100 mg capsule, Take 100 mg by mouth, Disp: , Rfl:     docusate sodium (COLACE) 100 mg capsule, Take 1 capsule (100 mg total) by mouth 2 (two) times a day as needed for constipation, Disp: 10 capsule, Rfl: 0    furosemide (LASIX) 40 mg tablet, Take 1 tablet (40 mg total) by mouth 3 (three) times a day, Disp: 90 tablet, Rfl: 0    glucose blood (RELION CONFIRM/MICRO TEST) test strip, Use as instructed, Disp: 100 each, Rfl: 0    hydrALAZINE (APRESOLINE) 25 mg tablet, Take 1 tablet (25 mg total) by mouth 3 (three) times a day, Disp: 90 tablet, Rfl: 0    insulin isophane-insulin regular (HUMULIN 70/30 KWIKPEN) 100 units/mL injection pen, 20 units twice daily, Disp: 5 pen, Rfl: 3    isosorbide mononitrate (IMDUR) 30 mg 24 hr tablet, Take 1 tablet (30 mg total) by mouth daily, Disp: 30 tablet, Rfl: 0    melatonin 3 mg, Take 1 tablet (3 mg total) by mouth daily at bedtime, Disp: 30 tablet, Rfl: 0    metoprolol tartrate (LOPRESSOR) 25 mg tablet, Take 1 tablet (25 mg total) by mouth every 12 (twelve) hours, Disp: 60 tablet, Rfl: 0    pantoprazole (PROTONIX) 40 mg tablet, Take 1 tablet (40 mg total) by mouth daily, Disp: 30 tablet, Rfl: 0    ranolazine (RANEXA) 500 mg 12 hr tablet, Take 1 tablet (500 mg total) by mouth every 12 (twelve) hours, Disp: 60 tablet, Rfl: 0      The following portions of the patient's history were reviewed and updated as appropriate: past medical history, past surgical history, family history, social history, allergies, current medications and active problem list       Review of Systems:  Constitutional: Denies fever, chills, weight gain,   Has weight loss, loss of appetite and fatigue  Eyes: Denies eye redness, eye discharge, double vision, change in visual acuity  ENT: Denies hearing loss, tinnitus, sneezing, nasal congestion, nasal discharge, sore throat   Respiratory: Denies cough, expectoration, hemoptysis, shortness of breath, wheezing  Cardiovascular:  has chest pain,  orthopnea, PND  Gastrointestinal: Denies abdominal pain, heartburn, nausea, vomiting, hematemesis, diarrhea, bloody stools  Genito-Urinary: Denies dysuria, frequency, difficulty in micturition, nocturia, incontinence  Musculoskeletal: Denies back pain, joint pain, muscle pain  Neurologic: Denies confusion, lightheadedness, syncope, headache, focal weakness, sensory changes, seizures  Endocrine: Denies polyuria, polydipsia, temperature intolerance  Allergy and Immunology: Denies hives, insect bite sensitivity  Hematological and Lymphatic: Denies bleeding problems, swollen glands   Psychological: Denies depression, suicidal ideation, anxiety, panic, mood swings  Dermatological: Denies pruritus, rash, skin lesion changes      Vitals:  Vitals:    01/22/19 1114   BP: 90/64   Pulse: 68   SpO2: 99%       There is no height or weight on file to calculate BMI  Weight (last 2 days)     Date/Time   Weight    01/22/19 1114  --    Weight: Patient unable to stand/wheelchair bound at 01/22/19 1114                Physical Examination:  General: Patient is  in distress  Awake, responding to commands somewhat  Very lethargic  Head: Normocephalic  Atraumatic  Eyes: Conjunctiva and lids with no swelling, erythema or discharge  Both pupils normal sized, round and reactive to light  Sclera nonicteric  ENT: External examination of nose and ear normal  Otoscopic examination shows translucent tympanic membranes with patent canals without erythema  Oropharynx moist with no erythema, edema, exudate or lesions  Neck: Supple  JVP not raised  Trachea midline  No masses  No thyromegaly  Lungs:  Has mild respiratory distress  Bilateral crackles heard  Chest wall: No tenderness  Cardiovascular: Normal PMI  No thrills  Regular rate and rhythm  S1 and S2 normal  No murmur, rub or gallop  Gastrointestinal: Abdomen soft, nontender  No guarding or rigidity  Liver and spleen not palpable  Bowel sounds present  Neurologic: Cranial nerves II-XII intact   Cortical functions normal  Motor system - Reflexes 2+ and symmetrical  Sensations normal  Musculoskeletal   No joint tenderness  Integumentary: Skin normal with no rash or lesions  Lymphatic: No palpable lymph nodes in neck, axilla or groin  Extremities: No clubbing, cyanosis, edema or varicosities  Psychological:   Blunt affect    Laboratory Results:  CBC with diff:   Lab Results   Component Value Date    WBC 9 52 01/16/2019    RBC 3 41 (L) 01/16/2019    HGB 9 0 (L) 01/16/2019    HCT 28 5 (L) 01/16/2019    MCV 84 01/16/2019    MCH 26 4 (L) 01/16/2019    RDW 17 8 (H) 01/16/2019     01/16/2019       CMP:  Lab Results   Component Value Date    CREATININE 3 89 (H) 01/16/2019    BUN 30 (H) 01/16/2019    K 5 5 (H) 01/16/2019    CL 86 (L) 01/16/2019    CO2 30 01/16/2019    ALKPHOS 61 01/06/2019    ALT 20 01/06/2019    AST 14 01/06/2019       Lab Results   Component Value Date    HGBA1C 8 2 (H) 01/02/2019    MG 2 3 01/10/2019    PHOS 2 2 (L) 01/10/2019       Lab Results   Component Value Date    TROPONINI 0 28 (H) 01/06/2019    TROPONINI 0 30 (H) 01/06/2019    TROPONINI 0 33 (H) 01/06/2019    CKTOTAL 85 01/02/2019       Lipid Profile:   No results found for: CHOL  Lab Results   Component Value Date    HDL 48 01/10/2019     Lab Results   Component Value Date    LDLCALC 58 01/10/2019     Lab Results   Component Value Date    TRIG 65 01/10/2019         Health Maintenance:  Health Maintenance   Topic Date Due    Medicare Annual Wellness Visit (AWV)  1945    CRC Screening: Colonoscopy  1945    Diabetic Foot Exam  05/27/1957    DM Eye Exam  05/27/1957    HEMOGLOBIN A1C  07/02/2019    URINE MICROALBUMIN  01/01/2020    Pneumococcal PPSV23/PCV13 65+ Years / High and Highest Risk (2 of 2 - PCV13) 01/01/2020    Fall Risk  01/22/2020    Depression Screening PHQ  01/22/2020    DTaP,Tdap,and Td Vaccines (2 - Td) 09/15/2020    Hepatitis C Screening  Completed    INFLUENZA VACCINE  Completed     Immunization History   Administered Date(s) Administered    Influenza, high dose seasonal 0 5 mL 12/31/2018    Pneumococcal Polysaccharide PPV23 01/01/2019         Dee Blake MD  1/22/2019,11:58 AM

## 2019-01-30 ENCOUNTER — TELEPHONE (OUTPATIENT)
Dept: NEPHROLOGY | Facility: CLINIC | Age: 74
End: 2019-01-30

## 2019-01-30 ENCOUNTER — TELEPHONE (OUTPATIENT)
Dept: INTERNAL MEDICINE CLINIC | Facility: CLINIC | Age: 74
End: 2019-01-30

## 2019-01-30 DIAGNOSIS — Z99.2 TYPE 2 DIABETES MELLITUS WITH CHRONIC KIDNEY DISEASE ON CHRONIC DIALYSIS, WITH LONG-TERM CURRENT USE OF INSULIN (HCC): Primary | ICD-10-CM

## 2019-01-30 DIAGNOSIS — N18.6 TYPE 2 DIABETES MELLITUS WITH CHRONIC KIDNEY DISEASE ON CHRONIC DIALYSIS, WITH LONG-TERM CURRENT USE OF INSULIN (HCC): Primary | ICD-10-CM

## 2019-01-30 DIAGNOSIS — Z79.4 TYPE 2 DIABETES MELLITUS WITH CHRONIC KIDNEY DISEASE ON CHRONIC DIALYSIS, WITH LONG-TERM CURRENT USE OF INSULIN (HCC): Primary | ICD-10-CM

## 2019-01-30 DIAGNOSIS — E11.22 TYPE 2 DIABETES MELLITUS WITH CHRONIC KIDNEY DISEASE ON CHRONIC DIALYSIS, WITH LONG-TERM CURRENT USE OF INSULIN (HCC): Primary | ICD-10-CM

## 2019-01-30 NOTE — TELEPHONE ENCOUNTER
Called and left a message on machine for patient stating that Per Dr Yariel Sierra that he does not need to come to the office for his hospital discharge appointment, because patient is on dialysis and is seen in the dialysis unit

## 2019-01-30 NOTE — TELEPHONE ENCOUNTER
left detailed msg that their insulin pen was not covered and  dr Travon Mackay sent in different type told them to call back if they needed needles and syringes

## 2019-01-31 ENCOUNTER — OFFICE VISIT (OUTPATIENT)
Dept: CARDIOLOGY CLINIC | Facility: CLINIC | Age: 74
End: 2019-01-31
Payer: MEDICARE

## 2019-01-31 VITALS
DIASTOLIC BLOOD PRESSURE: 62 MMHG | OXYGEN SATURATION: 97 % | WEIGHT: 136 LBS | SYSTOLIC BLOOD PRESSURE: 126 MMHG | BODY MASS INDEX: 22.63 KG/M2 | HEART RATE: 80 BPM

## 2019-01-31 DIAGNOSIS — I50.22 CHRONIC SYSTOLIC CONGESTIVE HEART FAILURE (HCC): ICD-10-CM

## 2019-01-31 DIAGNOSIS — I10 ESSENTIAL HYPERTENSION: ICD-10-CM

## 2019-01-31 DIAGNOSIS — I25.118 CORONARY ARTERY DISEASE OF NATIVE ARTERY OF NATIVE HEART WITH STABLE ANGINA PECTORIS (HCC): ICD-10-CM

## 2019-01-31 DIAGNOSIS — R06.02 SHORTNESS OF BREATH: ICD-10-CM

## 2019-01-31 DIAGNOSIS — I25.10 MULTI-VESSEL CORONARY ARTERY STENOSIS: ICD-10-CM

## 2019-01-31 DIAGNOSIS — I25.5 ISCHEMIC CARDIOMYOPATHY: Primary | ICD-10-CM

## 2019-01-31 DIAGNOSIS — E78.2 MIXED HYPERLIPIDEMIA: ICD-10-CM

## 2019-01-31 PROCEDURE — 99214 OFFICE O/P EST MOD 30 MIN: CPT | Performed by: INTERNAL MEDICINE

## 2019-01-31 RX ORDER — RANOLAZINE 500 MG/1
1000 TABLET, EXTENDED RELEASE ORAL 2 TIMES DAILY
Qty: 180 TABLET | Refills: 3 | Status: SHIPPED | OUTPATIENT
Start: 2019-01-31

## 2019-01-31 NOTE — PROGRESS NOTES
Cardiology Consultation     Jen Bueno  [id number]  1945  [address] [address]    C/c:  HOSPITAL FOLLOW-UP FOR ACUTE SYSTOLIC CHF AND NSTEMI    HPI:  27-year-old male with past medical history of coronary disease (left main, proximal LAD and left PDA lesions), ischemic cardiomyopathy could last known EF 30%, end-stage renal disease on hemodialysis, hypertension, diabetes, hyperlipidemia is here for follow-up after discharge  Patient was recently admitted to the hospital with acute systolic CHF and acute renal failure  Patient's volume status improved with dialysis  Patient did have cardiac catheterization which showed significant lesions in left main, lad and PDA  Patient was transferred to [place] for evaluation for CABG  Patient was seen by CT surgery, Dr Janet Bonilla who thought he is not a candidate for surgery due to poor functional status  Patient was also evaluated for high risk PCI of left main and LAD but eventually decision was made to manage medically  Since discharge patient continues to get exertional angina which he describes as pressure-like and substernal region  Patient also complains of severe shortness of breath on exertion  Patient apparently is active in home and dysfunction status is apparently slowly improving  But he is still very lethargic and does not do much  He denies having any lower extremity edema  No orthopnea paroxysmal nocturnal dyspnea      Patient Active Problem List   Diagnosis    Shortness of breath    Chest pain    End stage renal disease on dialysis ([facility] )    Cardiomyopathy ([facility] )    Hyponatremia    Chronic diastolic congestive heart failure (HCC)    Anemia due to chronic kidney disease, on chronic dialysis ([facility] )    Type 2 diabetes mellitus with kidney complication, with long-term current use of insulin ([facility] )    Weight loss    Coronary artery disease involving native coronary artery    Essential hypertension    Severe protein-calorie malnutrition (HCC)    Diabetic polyneuropathy (HCC)    Diabetic retinopathy ([facility] )    Mixed hyperlipidemia     Past Medical History:   Diagnosis Date    CHF (congestive heart failure) (HCC)     Diabetes mellitus ([facility] )     Dysphagia     Hyperlipidemia     Hypertension     Myocardial infarction [facility])      Social History     Social History    Marital status: /Civil Union     Spouse name: N/A    Number of children: N/A    Years of education: N/A     Occupational History    Not on file  Social History Main Topics    Smoking status: Former Smoker    Smokeless tobacco: Never Used    Alcohol use No    Drug use: No    Sexual activity: Not on file     Other Topics Concern    Not on file     Social History Narrative    No narrative on file      History reviewed  No pertinent family history  Past Surgical History:   Procedure Laterality Date    CATARACT EXTRACTION      ESOPHAGOGASTRODUODENOSCOPY N/A 1/10/2019    Procedure: ESOPHAGOGASTRODUODENOSCOPY (EGD); Surgeon: Willam Mohan MD;  Location: [facility];   Service: Gastroenterology    IR [address] PLACEMENT  1/4/2019       Current Outpatient Prescriptions:     aspirin 81 mg chewable tablet, Chew 81 mg daily, Disp: , Rfl:     atorvastatin (LIPITOR) 40 mg tablet, Take 1 tablet (40 mg total) by mouth daily, Disp: 30 tablet, Rfl: 0    b complex-vitamin C-folic acid (NEPHROCAPS) 1 mg capsule, Take 1 capsule by mouth daily with dinner, Disp: 30 capsule, Rfl: 0    docusate sodium (COLACE) 100 mg capsule, Take 1 capsule (100 mg total) by mouth 2 (two) times a day as needed for constipation, Disp: 10 capsule, Rfl: 0    furosemide (LASIX) 40 mg tablet, Take 1 tablet (40 mg total) by mouth 3 (three) times a day, Disp: 90 tablet, Rfl: 0    glucose blood (RELION CONFIRM/MICRO TEST) test strip, Use as instructed, Disp: 100 each, Rfl: 0    hydrALAZINE (APRESOLINE) 25 mg tablet, Take 1 tablet (25 mg total) by mouth 3 (three) times a day, Disp: 90 tablet, Rfl: 0    insulin NPH-insulin regular (NovoLIN 70/30) 100 units/mL subcutaneous injection, Inject 20 Units under the skin 2 (two) times a day before meals, Disp: 10 mL, Rfl: 5    isosorbide mononitrate (IMDUR) 30 mg 24 hr tablet, Take 1 tablet (30 mg total) by mouth daily, Disp: 30 tablet, Rfl: 0    melatonin 3 mg, Take 1 tablet (3 mg total) by mouth daily at bedtime, Disp: 30 tablet, Rfl: 0    metoprolol tartrate (LOPRESSOR) 25 mg tablet, Take 1 tablet (25 mg total) by mouth every 12 (twelve) hours, Disp: 60 tablet, Rfl: 0    pantoprazole (PROTONIX) 40 mg tablet, Take 1 tablet (40 mg total) by mouth daily, Disp: 30 tablet, Rfl: 0    ranolazine (RANEXA) 500 mg 12 hr tablet, Take 1 tablet (500 mg total) by mouth every 12 (twelve) hours, Disp: 60 tablet, Rfl: 0    benzonatate (TESSALON PERLES) 100 mg capsule, Take 100 mg by mouth, Disp: , Rfl:   No Known Allergies  Vitals:    01/31/19 1608   BP: 126/62   BP Location: Left arm   Patient Position: Sitting   Cuff Size: Adult   Pulse: 80   SpO2: 97%   Weight: 61 7 kg (136 lb)       Labs:  Admission on 01/08/2019, Discharged on 01/16/2019   No results displayed because visit has over 200 results  Admission on 12/31/2018, Discharged on 01/08/2019   No results displayed because visit has over 200 results  Imaging: Vas Carotid Complete Study    Result Date: 1/10/2019  Narrative:  [facility] REPORT CLINICAL: Indications: Patient presents for a general health evaluation secondary to future open heart surgery  Patient is asymptomatic at this time  Operative History: 2019-01-04 IR Permacath Placement Risk Factors The patient has history of HTN, Diabetes (Yes), HLD, CKD and CAD  The patient current BMI is 22 96, Weight is 138 lb and height is 65 in  Clinical Left Pressure:  147/74 mm Hg    FINDINGS:  Right        Impression  PSV  EDV (cm/s)  Direction of Flow  Ratio  Dist  ICA                 73          23                      1 12  Mid  ICA                  55          16                      0 84  Prox  ICA    1 - 49%      56          11                      0 86  Dist CCA                  63          14                            Mid CCA                   66          12                      1 28  Prox CCA                  51          10                      0 66  Ext Carotid               89           0                      1 36  Prox Vert                 60          15  Antegrade                 Subclavian               106           0                            Innominate                77          12                             Left         Impression  PSV  EDV (cm/s)  Direction of Flow  Ratio  Dist  ICA                 56          16                      0 87  Mid  ICA                  49          13                      0 77  Prox  ICA    1 - 49%      61          13                      0 94  Dist CCA                  50           6                            Mid CCA                   64           6                      0 77  Prox CCA                  84          11                            Ext Carotid               94           0                      1 47  Prox Vert                 54           8  Antegrade                 Subclavian               181           0                               CONCLUSION:  Impression RIGHT: There is <50% stenosis noted in the internal carotid artery  Plaque is heterogenous and irregular  Vertebral artery flow is antegrade  There is no significant subclavian artery disease  LEFT: There is <50% stenosis noted in the internal carotid artery  Plaque is heterogenous and irregular  Vertebral artery flow is antegrade  There is no significant subclavian artery disease    Internal carotid artery stenosis determination by consensus criteria from: Ernst Paez et al  Carotid Artery Stenosis: Gray-Scale and Doppler US Diagnosis - Society of Radiologists in [address] [place] [address], Radiology 2003; [phone]  SIGNATURE: Electronically Signed by: Jade Reynolds on 2019-01-10 12:59:33 PM    Vas Lower Limb Vein Mapping    Result Date: 1/10/2019  Narrative:  [facility] REPORT CLINICAL: Indications: Patient presents for a general health evaluation secondary to future open heart surgery  Patient is asymptomatic at this time  Operative History: 2019-01-04 IR Permacath Placement Risk Factors The patient has history of HTN, Diabetes (Yes), HLD, CKD and CAD  The patient current BMI is 22 96, Weight is 138 lb and height is 65 in  FINDINGS:  Segment         Right        Left                            Diameter AP  Diameter AP  GSV Inguinal                         5 6  GSV Prox Thigh          2 4          1 7  GSV Mid Thigh           1 5          1 5  GSV Dist Thigh          1 8          2 1  GSV Knee                2 2          2 0  GSV Ankle               1 3          2 1  GSV Prox Calf           1 6          1 6  GSV Mid Calf            1 5          1 6  GSV Dist Calf           1 3          1 9     CONCLUSION:  Impression: RIGHT LOWER LIMB: The great saphenous vein is patent  from the groin to the ankle  The vein is not of adequate caliber and quality for graft conduit with intraluminal diameters ranging from 2 4 mm to 1 3 mm throughout the leg  LEFT LOWER LIMB: The great saphenous vein is patent  from the groin to the ankle  The vein is of adequate caliber and quality for graft conduit from the distal thigh to the knee with intraluminal diameters ranging from 5 6 mm to 1 5 mm throughout the leg    SIGNATURE: Electronically Signed by: Jade Reynolds on 2019-01-10 01:00:29 PM    Ir Permacath Placement    Result Date: 1/4/2019  Narrative: EXAMINATION: Tunneled hemodialysis catheter placement INDICATION: 14-year-old male with ESRD will require to dialysis and presents for tunneled hemodialysis catheter placement  CONTRAST: None FLUOROSCOPY TIME:   36 seconds IMAGES:  4 ANESTHESIA: Moderate sedation and local lidocaine PROCEDURE:  The patient was identified verbally and by wristband  Timeout was performed  Informed consent was obtained  Following obtaining informed consent, the patient was prepped and draped in the usual sterile fashion  All elements of maximal sterile barrier technique, cap and mask and sterile gown and sterile gloves and sterile full-body drape and hand hygiene and 2% chlorhexidine for cutaneous antisepsis  Sterile ultrasound technique with sterile gel and sterile probe covers was also utilized  1% local lidocaine with epinephrine was infiltrated into the skin and subcutaneous tissues overlying the right base of neck  Under ultrasound guidance, the right IJV was punctured using a micropuncture needle  Images were saved  Access was secured using a 4-Mauritanian transitional sheath  A microwire was used to measure the length needed for the dialysis catheter  Local anesthetic was infiltrated into the skin and subcutaneous tissues of the right chest wall  A 1 cm incision was made and a 14  5-Mauritanian 28 cm Medcomp Hemo-Flow double-lumen hemodialysis catheter was tunneled from this site in the subcutaneous tissues and brought out at the right IJV puncture site  A J-wire was advanced through the transitional sheath into the IVC  The transitional sheath was exchanged for a peel-away sheath  The J-wire was removed and the dialysis catheter was advanced through the peel-away sheath, followed by removal of the sheath  Final positioning of the dialysis catheter was verified under fluoroscopy  Catheter was found to aspirate and flush easily through both lumens  Both lumens were locked with heparin  Catheter was secured to skin using 2-0 Prolene suture and sterile dressing was applied  The patient tolerated the procedure well without complication  The patient left the IR department in stable condition  Findings: 1  Using ultrasound guidance, the right IJ vein was cannulated using seldinger technique  The right IJ vein was evaluated as a potential access site  The right IJ vein was patent, and free of thrombus  Static images of the vessel was obtained  Visualization of real time needle entry into the vessel was obtained  2   Successful placement of tunneled hemodialysis catheter through right IJV access using 14  5-Mauritanian 28 cm Medcomp Hemo-Flow double-lumen catheter  3   Final image shows catheter tip in the right atrium  Impression: Impression: Successful placement of tunneled dialysis catheter through right IJV access  Workstation performed: [id number]       Review of Systems:  Review of Systems   Constitutional: Negative for diaphoresis and fatigue  HENT: Negative for congestion and facial swelling  Eyes: Negative for photophobia and visual disturbance  Respiratory: Positive for chest tightness  Negative for shortness of breath  Cardiovascular: Negative for chest pain, palpitations and leg swelling  Gastrointestinal: Negative for abdominal pain and blood in stool  Endocrine: Negative for cold intolerance and heat intolerance  Musculoskeletal: Negative for arthralgias and myalgias  Skin: Negative for pallor and rash  Neurological: Negative for dizziness and syncope  Physical Exam:  Physical Exam   Constitutional: He is oriented to person, place, and time  He appears well-developed and well-nourished  HENT:   Head: Normocephalic and atraumatic  Eyes: Pupils are equal, round, and reactive to light  Conjunctivae and EOM are normal    Neck: Normal range of motion  Neck supple  No JVD present  No thyromegaly present  Cardiovascular: Normal rate, regular rhythm, normal heart sounds and intact distal pulses  Exam reveals no gallop and no friction rub  No murmur heard  Pulmonary/Chest: Effort normal  No respiratory distress   He has decreased breath sounds in the right middle field and the right lower field  He has no wheezes  He has no rales  Abdominal: Soft  Bowel sounds are normal  He exhibits no distension  There is no tenderness  Musculoskeletal: Normal range of motion  He exhibits no edema  Neurological: He is alert and oriented to person, place, and time  He has normal reflexes  Skin: Skin is warm and dry  Psychiatric: He has a normal mood and affect  Discussion/Summary:  1  Coronary disease/CCS class 3 angina  Patient gets angina in spite of maximal medical management including 3 antianginals  I will increase the dose of Ranexa  If his functional status improves, will refer him for CT surgery again to see if he would be a candidate for CABG  Patient will do better with CABG compared to PCI given history of diabetes  2  Ischemic cardiomyopathy/chronic systolic CHF  Close to euvolemic  On dialysis and Lasix    3   Hypertension, blood pressure well controlled    Follow-up with me in a month

## 2019-05-06 ENCOUNTER — TELEPHONE (OUTPATIENT)
Dept: INTERNAL MEDICINE CLINIC | Facility: CLINIC | Age: 74
End: 2019-05-06

## 2019-08-01 ENCOUNTER — TELEPHONE (OUTPATIENT)
Dept: INTERNAL MEDICINE CLINIC | Facility: CLINIC | Age: 74
End: 2019-08-01

## 2019-09-12 ENCOUNTER — TELEPHONE (OUTPATIENT)
Dept: INTERNAL MEDICINE CLINIC | Facility: CLINIC | Age: 74
End: 2019-09-12